# Patient Record
Sex: FEMALE | Race: BLACK OR AFRICAN AMERICAN | Employment: PART TIME | URBAN - METROPOLITAN AREA
[De-identification: names, ages, dates, MRNs, and addresses within clinical notes are randomized per-mention and may not be internally consistent; named-entity substitution may affect disease eponyms.]

---

## 2021-02-08 NOTE — TELEPHONE ENCOUNTER
REFERRAL INFORMATION:    Referring Provider:  Self Referral     Referring Clinic:  N/A    Reason for Visit/Diagnosis: New Surg, BMI 48        FUTURE VISIT INFORMATION:    Appointment Date: 2/16/2021    Appointment Time: 2:15 PM      NOTES RECORD STATUS  DETAILS   OFFICE NOTE from Referring Provider N/A    OFFICE NOTE from Other Specialists N/A    HOSPITAL DISCHARGE SUMMARY/ ED VISITS  N/A    OPERATIVE REPORT N/A    ENDOSCOPY (EGD)  N/A    PERTINENT LABS Care Everywhere    PATHOLOGY REPORTS (RELATED) N/A    IMAGING (CT, MRI, US, XR)  N/A

## 2021-02-16 ENCOUNTER — VIRTUAL VISIT (OUTPATIENT)
Dept: ENDOCRINOLOGY | Facility: CLINIC | Age: 36
End: 2021-02-16
Payer: COMMERCIAL

## 2021-02-16 ENCOUNTER — PRE VISIT (OUTPATIENT)
Dept: ENDOCRINOLOGY | Facility: CLINIC | Age: 36
End: 2021-02-16

## 2021-02-16 VITALS — BODY MASS INDEX: 49.51 KG/M2 | HEIGHT: 64 IN | WEIGHT: 290 LBS

## 2021-02-16 DIAGNOSIS — E66.01 MORBID OBESITY DUE TO EXCESS CALORIES (H): Primary | ICD-10-CM

## 2021-02-16 DIAGNOSIS — Z71.3 NUTRITIONAL COUNSELING: ICD-10-CM

## 2021-02-16 PROBLEM — O24.410 DIET CONTROLLED GESTATIONAL DIABETES MELLITUS (GDM) IN THIRD TRIMESTER: Status: ACTIVE | Noted: 2021-02-16

## 2021-02-16 PROCEDURE — 99204 OFFICE O/P NEW MOD 45 MIN: CPT | Mod: 95 | Performed by: NURSE PRACTITIONER

## 2021-02-16 PROCEDURE — 97802 MEDICAL NUTRITION INDIV IN: CPT | Mod: 95 | Performed by: DIETITIAN, REGISTERED

## 2021-02-16 ASSESSMENT — MIFFLIN-ST. JEOR: SCORE: 1995.43

## 2021-02-16 ASSESSMENT — PAIN SCALES - GENERAL: PAINLEVEL: NO PAIN (0)

## 2021-02-16 NOTE — LETTER
"2/16/2021       RE: Babita Rivas  [address]  [facility] [address]     Dear Colleague,    Thank you for referring your patient, Babita Rivas, to the [facility] [place] at [facility]. Please see a copy of my visit note below.    Babita Rivas is a 36 year old female who is being evaluated via a billable video visit.      The patient has been notified of following:     \"This video visit will be conducted via a call between you and your physician/provider. We have found that certain health care needs can be provided without the need for an in-person physical exam.  This service lets us provide the care you need with a video conversation.  If a prescription is necessary we can send it directly to your pharmacy.  If lab work is needed we can place an order for that and you can then stop by our lab to have the test done at a later time.    Video visits are billed at different rates depending on your insurance coverage.  Please reach out to your insurance provider with any questions.    If during the course of the call the physician/provider feels a video visit is not appropriate, you will not be charged for this service.\"    Patient has given verbal consent for Video visit? Yes  How would you like to obtain your AVS? MyChart  If you are dropped from the video visit, the video invite should be resent to: Text to cell phone: [phone]  Will anyone else be joining your video visit? No        Video-Visit Details    Type of service:  Video Visit    Video Start Time: 2:59 PM  Video End Time: 3:23 PM  * called cut out during visit, RD tried calling patient back and received voicemail left message to reschedule Or schedule for follow-up appointment in 1 month.     Originating Location (pt. Location): Home    Distant Location (provider location):  [facility] [place]     Platform used " for Video Visit: [employer]    During this virtual visit the patient is located in [place], patient verifies this as the location during the entirety of this visit.     New Bariatric Nutrition Consultation Note    Reason For Visit: Nutrition Assessment    Babita Rivas is a 35 year old presenting today for new bariatric nutrition consult.   Pt is interested in laparoscopic sleeve gastrectomy with Dr. Fitzpatrick expected surgery in May 2021. Patient is accompanied by self.  This is pt's first of 3rd required nutrition visits prior to surgery.     Pt referred by Arlette Corbett NP on February 15, 2021.  Patient with Co-morbidities of obesity including:  Type II DM no  Renal Failure no  Sleep apnea no  Hypertension no   Dyslipidemia no  Joint pain no  Back pain no  GERD no     Support System Reviewed With Patient 2/9/2021   Who do you have in your support network that can be available to help you for the first 2 weeks after surgery? My mother my kids my kids father   Who can you count on for support throughout your weight loss surgery journey? My family       ANTHROPOMETRICS:  There is no height or weight on file to calculate BMI.    Required weight loss goal pre-op: 10  lbs from initial consult weight (goal weight 280 lbs or less before surgery)       2/9/2021   I have tried the following methods to lose weight Watching portions or calories, Exercise, Slimfast, Prescription Medications       Weight Loss Questions Reviewed With Patient 2/9/2021   How long have you been overweight? From Middle age and beyond       SUPPLEMENT INFORMATION:  None at this time    NUTRITION HISTORY:    Skips meals frequently     Recall Diet Questions Reviewed With Patient 2/9/2021   Describe what you typically consume for breakfast (typical or most recent): Nothing or palmer egg sausage   Describe what you typically consume for lunch (typical or most recent): Order out if I m at work   Describe what you typically consume for supper (typical or most  recent): Chicken   Describe what you typically consume as snacks (typical or most recent): Chips   How many ounces of water, or other low calorie drinks, do you drink daily (8 oz=1 glass)? 24 oz   How many ounces of caffeine (coffee, tea, pop) do you drink daily (8 oz=1 glass)? 24 oz   How many ounces of carbonated (pop, beer, sparkling water) drinks do you drinky daily (8 oz=1 glass)? 32 oz - pop    How many ounces of juice, pop, sweet tea, sports drinks, protein drinks, other sweetened drinks, do you drink daily (8 oz=1 glass)? 32 oz   How many ounces of milk do you drink daily (8 oz=1 glass) 24 oz   Please indicate the type of milk: 2%   How often do you drink alcohol? 2-4 times a month   If you do drink alcohol, how many drinks might you have in a day? (one drink = 5 oz. wine, 1 can/bottle of beer, 1 shot liquor) 3 or 4       Eating Habits 2/9/2021   Do you have any dietary restrictions? No   Do you currently binge eat (eat a large amount of food in a short time)? No   Are you an emotional eater? No   Do you get up to eat after falling asleep? No   What foods do you crave? Crab legs shrimp       ADDITIONAL INFORMATION:    Dining Out History Reviewed With Patient 2/9/2021   How often do you dine out? Rarely.   Where do you dine out? (select all that apply) take out   What types of food do you order when you dine out? Sea food tami dickinson       Physical Activity Reviewed With Patient 2/9/2021   How often do you exercise? 1 to 2 times per week   What is the duration of your exercise (in minutes)? 15 Minutes   What types of exercise do you do? walking   What keeps you from being more active?  Pain         NUTRITION DIAGNOSIS:  Obesity r/t long history of self-monitoring deficit and excessive energy intake aeb BMI >30 kg/m2.    INTERVENTION:  Intervention Provided/Education Provided on post-op diet guidelines, vitamins/minerals essential post-operatively, GI anatomy of bariatric surgeries, ways to help prepare for  "post-op diet guidelines pre-operatively, portion/calorie-control, mindful eating and sources of protein.  Patient demonstrates understanding. Provided pt with list of goals RD contact information.      Questions Reviewed With Patient 2/9/2021   How ready are you to make changes regarding your weight? Number 1 = Not ready at all to make changes up to 10 = very ready. 10   How confident are you that you can change? 1 = Not confident that you will be successful making changes up to 10 = very confident. 10       Expected Engagement: good    GOALS:  Relating To Eating:  Eat slowly (20-30 minutes per meal), chewing foods well (25 chews per bite/applesauce consistency)  9\" Plate method (1/2 plate non-starchy vegetables/fruit, 1/4 plate lean protein, 1/4 plate whole grain starch - no more than 1/2 cup carb/meal)  Eat 3 meals per day  Avoid snacking    Relating to beverages:  Reduce caffeine/carbonation/calorie containing beverages    Initial Consult / Weight Loss    My Plate Planner_English - Pt Education  [email or web address]    Protein Sources for Weight Loss  [email or web address]     Carbohydrates  [email or web address]       Time spent with patient:24 minutes.  Becca Matthews, MS, RD, LD      "

## 2021-02-16 NOTE — ASSESSMENT & PLAN NOTE
Disorganized eating pattern. Denies hunger, cravings, excessive thoughts about food. Defer medications for weight loss for now. Most recent A1C was 4/2020 and was 6.1. Will update labs now. Could consider treatment of insulin resistance with metformin or GLP1 for preop weight loss.

## 2021-02-16 NOTE — NURSING NOTE
"Chief Complaint   Patient presents with     Consult     New bariatric appt.       Vitals:    02/16/21 1345   Weight: 131.5 kg (290 lb)   Height: 1.626 m (5' 4\")       Body mass index is 49.78 kg/m .                            AJ JONES, EMT    "

## 2021-02-16 NOTE — PROGRESS NOTES
Babita is a 35 year old who is being evaluated via a billable video visit.      How would you like to obtain your AVS? MyChart  If the video visit is dropped, the invitation should be resent by: Text to cell phone: [phone]  Will anyone else be joining your video visit? No      Video Start Time: 1411  Video-Visit Details    Type of service:  Video Visit    Video End Time:1443    Originating Location (pt. Location): Home    Distant Location (provider location):  [facility] [place]     Platform used for Video Visit: [employer]

## 2021-02-16 NOTE — PROGRESS NOTES
"Babita Rivas is a 36 year old female who is being evaluated via a billable video visit.      The patient has been notified of following:     \"This video visit will be conducted via a call between you and your physician/provider. We have found that certain health care needs can be provided without the need for an in-person physical exam.  This service lets us provide the care you need with a video conversation.  If a prescription is necessary we can send it directly to your pharmacy.  If lab work is needed we can place an order for that and you can then stop by our lab to have the test done at a later time.    Video visits are billed at different rates depending on your insurance coverage.  Please reach out to your insurance provider with any questions.    If during the course of the call the physician/provider feels a video visit is not appropriate, you will not be charged for this service.\"    Patient has given verbal consent for Video visit? Yes  How would you like to obtain your AVS? MyChart  If you are dropped from the video visit, the video invite should be resent to: Text to cell phone: [phone]  Will anyone else be joining your video visit? No        Video-Visit Details    Type of service:  Video Visit    Video Start Time: 2:59 PM  Video End Time: 3:23 PM  * called cut out during visit, RD tried calling patient back and received voicemail left message to reschedule Or schedule for follow-up appointment in 1 month.     Originating Location (pt. Location): Home    Distant Location (provider location):  [facility] [place]     Platform used for Video Visit: [employer]    During this virtual visit the patient is located in [place], patient verifies this as the location during the entirety of this visit.     New Bariatric Nutrition Consultation Note    Reason For Visit: Nutrition Assessment    Babita Rivas is a 35 year old presenting today for new bariatric nutrition " consult.   Pt is interested in laparoscopic sleeve gastrectomy with Dr. Fitzpatrick expected surgery in May 2021. Patient is accompanied by self.  This is pt's first of 3rd required nutrition visits prior to surgery.     Pt referred by Arlette Corbett NP on February 15, 2021.  Patient with Co-morbidities of obesity including:  Type II DM no  Renal Failure no  Sleep apnea no  Hypertension no   Dyslipidemia no  Joint pain no  Back pain no  GERD no     Support System Reviewed With Patient 2/9/2021   Who do you have in your support network that can be available to help you for the first 2 weeks after surgery? My mother my kids my kids father   Who can you count on for support throughout your weight loss surgery journey? My family       ANTHROPOMETRICS:  There is no height or weight on file to calculate BMI.    Required weight loss goal pre-op: 10  lbs from initial consult weight (goal weight 280 lbs or less before surgery)       2/9/2021   I have tried the following methods to lose weight Watching portions or calories, Exercise, Slimfast, Prescription Medications       Weight Loss Questions Reviewed With Patient 2/9/2021   How long have you been overweight? From Middle age and beyond       SUPPLEMENT INFORMATION:  None at this time    NUTRITION HISTORY:    Skips meals frequently     Recall Diet Questions Reviewed With Patient 2/9/2021   Describe what you typically consume for breakfast (typical or most recent): Nothing or palmer egg sausage   Describe what you typically consume for lunch (typical or most recent): Order out if I m at work   Describe what you typically consume for supper (typical or most recent): Chicken   Describe what you typically consume as snacks (typical or most recent): Chips   How many ounces of water, or other low calorie drinks, do you drink daily (8 oz=1 glass)? 24 oz   How many ounces of caffeine (coffee, tea, pop) do you drink daily (8 oz=1 glass)? 24 oz   How many ounces of carbonated (pop, beer,  sparkling water) drinks do you drinky daily (8 oz=1 glass)? 32 oz - pop    How many ounces of juice, pop, sweet tea, sports drinks, protein drinks, other sweetened drinks, do you drink daily (8 oz=1 glass)? 32 oz   How many ounces of milk do you drink daily (8 oz=1 glass) 24 oz   Please indicate the type of milk: 2%   How often do you drink alcohol? 2-4 times a month   If you do drink alcohol, how many drinks might you have in a day? (one drink = 5 oz. wine, 1 can/bottle of beer, 1 shot liquor) 3 or 4       Eating Habits 2/9/2021   Do you have any dietary restrictions? No   Do you currently binge eat (eat a large amount of food in a short time)? No   Are you an emotional eater? No   Do you get up to eat after falling asleep? No   What foods do you crave? Crab legs shrimp       ADDITIONAL INFORMATION:    Dining Out History Reviewed With Patient 2/9/2021   How often do you dine out? Rarely.   Where do you dine out? (select all that apply) take out   What types of food do you order when you dine out? Sea food pasta teena       Physical Activity Reviewed With Patient 2/9/2021   How often do you exercise? 1 to 2 times per week   What is the duration of your exercise (in minutes)? 15 Minutes   What types of exercise do you do? walking   What keeps you from being more active?  Pain         NUTRITION DIAGNOSIS:  Obesity r/t long history of self-monitoring deficit and excessive energy intake aeb BMI >30 kg/m2.    INTERVENTION:  Intervention Provided/Education Provided on post-op diet guidelines, vitamins/minerals essential post-operatively, GI anatomy of bariatric surgeries, ways to help prepare for post-op diet guidelines pre-operatively, portion/calorie-control, mindful eating and sources of protein.  Patient demonstrates understanding. Provided pt with list of goals RD contact information.      Questions Reviewed With Patient 2/9/2021   How ready are you to make changes regarding your weight? Number 1 = Not ready at all  "to make changes up to 10 = very ready. 10   How confident are you that you can change? 1 = Not confident that you will be successful making changes up to 10 = very confident. 10       Expected Engagement: good    GOALS:  Relating To Eating:  Eat slowly (20-30 minutes per meal), chewing foods well (25 chews per bite/applesauce consistency)  9\" Plate method (1/2 plate non-starchy vegetables/fruit, 1/4 plate lean protein, 1/4 plate whole grain starch - no more than 1/2 cup carb/meal)  Eat 3 meals per day  Avoid snacking    Relating to beverages:  Reduce caffeine/carbonation/calorie containing beverages    Initial Consult / Weight Loss    My Plate Planner_English - Pt Education  [email or web address]    Protein Sources for Weight Loss  [email or web address]     Carbohydrates  [email or web address]       Time spent with patient:24 minutes.  Becca Matthews, MS, RD, LD    "

## 2021-02-16 NOTE — LETTER
"2021       RE: Babita Rivas  [address]  [place] [address]     Dear Colleague,    Thank you for referring your patient, Babita Rivas, to the [facility] [place] at [facility]. Please see a copy of my visit note below.    New Bariatric Surgery Consultation Note    2021    RE: Babita Rivas  MR#: [id number]  : 1985      Referring provider: No flowsheet data found.    Chief Complaint/Reason for visit: evaluation for possible weight loss surgery    Dear Elizabeth Pascual PA-C (General),    I had the pleasure of seeing your patient, Babita Rivas, to evaluate her obesity and consider her for possible weight loss surgery. As you know, Babita Rivas is 35 year old.  She has a height of 5' 4\", a weight of 290 lbs 0 oz, and calculated Body mass index is 49.78 kg/m .    Assessment & Plan   Problem List Items Addressed This Visit        Digestive    Morbid obesity due to excess calories (H) - Primary     Disorganized eating pattern. Denies hunger, cravings, excessive thoughts about food. Defer medications for weight loss for now. Most recent A1C was 2020 and was 6.1. Will update labs now. Could consider treatment of insulin resistance with metformin or GLP1 for preop weight loss.          Relevant Orders    CBC with platelets    Comprehensive metabolic panel    Hemoglobin A1c    Lipid panel reflex to direct LDL Fasting    Parathyroid Hormone Intact    Vitamin D Deficiency    TSH with free T4 reflex         [facility] Everywhere   Review of external notes as documented above     50 minutes spent on the date of the encounter doing chart review, history and exam, documentation and further activities as noted above    Babita Rivas is a 35 year old female who presents to clinic today for the following health issues       HISTORY OF PRESENT ILLNESS:  Weight Loss History " Reviewed with Patient 2021   How long have you been overweight? From Middle age and beyond   What is the most that you have ever weighed? 290   What is the most weight you have lost? 40   I have tried the following methods to lose weight Watching portions or calories, Exercise, Slimfast, Prescription Medications   I have tried the following weight loss medications? (Check all that apply) None   Have you ever had weight loss surgery? No     Weight gain after pregnancies, after most recent pregnancy was able to lose 40lbs but then has regained this over the last 4 years   CO-MORBIDITIES OF OBESITY INCLUDE:     2021   I have the following health issues associated with obesity: Asthma     Gestational diabetes with most recent pregnancy   Pre-diabetes- A1C 6.3 2020   Sleep- no snoring, wakes refreshed   Reflux- none     Hasn't seen primary care in over a year, needs new primary care provider     PAST MEDICAL HISTORY:  History reviewed. No pertinent past medical history.  No blood clots     PAST SURGICAL HISTORY:  Past Surgical History:   Procedure Laterality Date      SECTION      x 2     TUBAL LIGATION         FAMILY HISTORY:   No family history on file.    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 Yes   Who do you have in your support network that can be available to help you for the first 2 weeks after surgery? My mother my kids my kids father   Who can you count on for support throughout your weight loss surgery journey? My family   Can you afford 3 meals a day?  Yes   Can you afford 50-60 dollars a month for vitamins? 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 2-4 times a month   If you do drink alcohol, how many drinks might you have in a day? (one drink = 5 oz. wine, 1 can/bottle of beer, 1 shot liquor) 3 or 4   Have you ever used any of the following nicotine products? No   Have you or are you currently using street drugs or prescription strength medication for which you do not have a prescription for? No   Do you have a history of chemical dependency (alcohol or drug abuse)? No         PSYCHOLOGICAL HISTORY:   Psychological History Reviewed With Patient 2/9/2021   Have you ever attempted suicide? Never.   Have you had thoughts of suicide in the past year? No   Have you ever been hospitalized for mental illness or a suicide attempt? Never.   Do you have a history of chronic pain? No   Have you ever been diagnosed with fibromyalgia? No   Are you currently being treated for any of the following? (select all that apply) Depression   Are you currently seeing a therapist or counselor?  No   Are you currently seeing a psychiatrist? No     Hasn't worked with therapist since [facility] but can go back if needed - was more for family issues     ROS:     2/9/2021   Skin:  Leg swelling   HEENT: None of these   Musculoskeletal: Back pain, Limited mobility, Swelling of legs   Cardiovascular: Shortness of breath with activity   Pulmonary: Shortness of breath with activity, Awaken from sleep to catch your breath   Gastrointestinal: None of the above   Genitourinary: Stress incontinence (losing urine when coughing, sneezing, etc.)   Hematological: None of the above   Neurological: None of the above   Female only: None of the above       EATING BEHAVIORS:     2/9/2021   Have you or anyone else thought that you had an eating disorder? No   Do you currently binge eat (eat a large amount of food in a short time)? No   Are you an emotional eater? No   Do you get up to eat after falling asleep? No     Skips breakfast  Eats  "late into the evening     EXERCISE:     2/9/2021   How often do you exercise? 1 to 2 times per week   What is the duration of your exercise (in minutes)? 15 Minutes   What types of exercise do you do? walking   What keeps you from being more active?  Pain       MEDICATIONS:  No current outpatient medications on file.       ALLERGIES:  No Known Allergies      PHYSICAL EXAM:  Objective    Ht 1.626 m (5' 4\")   Wt 131.5 kg (290 lb)   BMI 49.78 kg/m    Vitals - Patient Reported  Pain Score: No Pain (0)      Vitals:  No vitals were obtained today due to virtual visit.    Physical Exam   GENERAL: Healthy, alert and no distress  EYES: Eyes grossly normal to inspection.  No discharge or erythema, or obvious scleral/conjunctival abnormalities.  RESP: No audible wheeze, cough, or visible cyanosis.  No visible retractions or increased work of breathing.    SKIN: Visible skin clear. No significant rash, abnormal pigmentation or lesions.  NEURO: Cranial nerves grossly intact.  Mentation and speech appropriate for age.  PSYCH: Mentation appears normal, affect normal/bright, judgement and insight intact, normal speech and appearance well-groomed.      In summary, Babita Rivas has Class III obesity with a body mass index of Body mass index is 49.78 kg/m . kg/m2 and the comorbidities stated above. She completed an informational seminar and is a possible candidate for the laparoscopic gastric sleeve.  She will have to complete the following pre-requisites:    Received weight loss goal of 10 lb prior to surgery. 280 day of surgery   Dietitian x 3   Have preoperative laboratory tests drawn.  Psychological Evaluation with MMPI and clearance for weight loss surgery.  Letter of clearance from the following primary care provider     Today in the office we discussed gastric sleeve surgery. Preoperative, perioperative, and postoperative processes, management, and follow up were addressed.  Risks and benefits were outlined including " the risk of death, staple line leak (1-2%), PE, DVT, ulcer, worsening GERD, N/V, stricture, hernia, wound infection, weight regain, and vitamin deficiencies. I emphasized exercise and activity along with appropriate food choice as the main foundation for weight loss with surgery providing surgical reinforcement of this.  All questions were answered.  A goal sheet and support group handout were given to the patient.      MEDICATIONS:  Continue current medications without change  FUTURE LABS:       - Schedule a fasting blood draw - bariatric screening labs ordered  FUTURE APPOINTMENTS:       - Follow-up with me in 2 months       - Dr. Fitzpatrick in 1 month        - Dietitian monthly until surgery       -Schedule psych consult       - establish care with primary care provider, need letter of support       - schedule lab only appointment- any [place] location       Is patient currently taking narcotic/opioid medications? NO    Once the patient has completed the requirements in their task list and there are no further recommendations, the pt will be allowed to see the surgeon of her choice for consultation on the laparoscopic gastric sleeve surgery. Patient verbalizes understanding of the process to surgery and expectations for the postoperative period including the need for lifelong lifestyle changes, vitamin supplementation, and laboratory monitoring.    Sincerely,     Arlette Corbett NP    Bariatric Task List  Status:  Is patient a candidate for bariatric surgery?:  patient is a candidate for bariatric surgery -     Cleared to schedule surgeon consult?:    -     Status:    -     Surgeon: Nanette  -     Tentative surgery month/year: May 2021  -        Insurance: Insurance:  [facility]  -     [place]: PCP Recommendation and Medical Clearance:    -     HP Referral:    -     Other:    -        Patient Info: Initial Weight:  290 -     Date of Initial Weight/Height:  2/16/2021 -     Goal Weight (lbs):  280 - encouraged more   "  Required Weight Loss:  10 -     Surgery Type:  sleeve gastrectomy -     Multidisciplinary Meeting:    -        Dietician Visits: Structured weight loss required by insurance?:  structured weight loss required -     Dietician Visit 1:  Needed -     Dietician Visit 2:  Needed -     Dietician Visit 3:  Needed -     Dietician Visit 4:    -     Dietician Visit 5:    -     Dietician Visit 6:    -     Dietician Visit additional:    -     Clearance from dietician to see surgeon?:    -     Dietician Notes:    -        Psychological Evaluation: Psych eval:  Needed -     Therapist letter of support:    -     Psychiatrist letter of support:    -     Establish care with therapist:    -     Complete eating disorder evaluation:    -     Letter of clearance from therapist/eating disorder program:    -     Other:    -        Lab Work: Complete Blood Count:  Needed -     Comprehensive Metabolic Panel:  Needed -     Vitamin D:  Needed -     Hgb A1c:  Needed -     PTH:  Needed -     H. pylori:    -     TSH:  Needed -     Nicotine Testing:    -     Other:  Needed - lipids      Consults/ Clearance: Sleep Medicine:    -     Cardiac:    -     Pain:   -     Dental:    -     Endocrine:    -     Gastroenterology:    -     Vascular Medicine:    -     Hematology:    -     Medical Weight Management:   -     Physical Therapy/Exercise:    -     Nephrology:    -     Neurology:    -     Pulmonology:    -     Rheumatology:    -     Other    -     Other    -     Other    -           Testing: UGI:    -     EGD:    -     Other:    -        PCP: Establish care with PCP:  Needed -     Follow up with PCP:    -     PCP letter of support:  Needed -        Smoking: Quit tobacco use (3 months smoke free)?:    -     Quit date:    -        Patient Education:  Information Session:  Completed -     Given \"Making your decision\" handout?:    -     Given support group information?:    -     Attended support group?:    -     Support plan in place?:    -     Research " consents signed?:    -        Additional Surgery Requirements: Review Coag plan:    -     HgA1c <8:    -     Inpatient pain consult:    -     Final nicotine screen:    -     Dental work complete:    -     Birth control plan:    -     Other Requirements:    -     Other Requirements:    -        Final Tasks:  Before surgery online class:  Needed -     Before surgery online class website link:  [email or web address]   After surgery online class:  Needed -     After surgery online class website link:  [email or web address]   Nurse visit for weigh-in and information:  Needed -     Pre-assessment clinic visit with anesthesia team for H&P:  Needed -     Final labs (Hgb, plt, T&S, UA):  Needed -        Notes:   -              Babita is a 35 year old who is being evaluated via a billable video visit.      How would you like to obtain your AVS? MyChart  If the video visit is dropped, the invitation should be resent by: Text to cell phone: [phone]  Will anyone else be joining your video visit? No      Video Start Time: 1411  Video-Visit Details    Type of service:  Video Visit    Video End Time:1443    Originating Location (pt. Location): Home    Distant Location (provider location):  [facility] [place]     Platform used for Video Visit: [employer]

## 2021-02-16 NOTE — PROGRESS NOTES
"New Bariatric Surgery Consultation Note    2021    RE: Babita Rivas  MR#: [id number]  : 1985      Referring provider: No flowsheet data found.    Chief Complaint/Reason for visit: evaluation for possible weight loss surgery    Dear Elizabeth Pascual PA-C (General),    I had the pleasure of seeing your patient, Babita Rivas, to evaluate her obesity and consider her for possible weight loss surgery. As you know, Babita Rivas is 35 year old.  She has a height of 5' 4\", a weight of 290 lbs 0 oz, and calculated Body mass index is 49.78 kg/m .    Assessment & Plan   Problem List Items Addressed This Visit        Digestive    Morbid obesity due to excess calories (H) - Primary     Disorganized eating pattern. Denies hunger, cravings, excessive thoughts about food. Defer medications for weight loss for now. Most recent A1C was 2020 and was 6.1. Will update labs now. Could consider treatment of insulin resistance with metformin or GLP1 for preop weight loss.          Relevant Orders    CBC with platelets    Comprehensive metabolic panel    Hemoglobin A1c    Lipid panel reflex to direct LDL Fasting    Parathyroid Hormone Intact    Vitamin D Deficiency    TSH with free T4 reflex         [facility] Everywhere   Review of external notes as documented above     50 minutes spent on the date of the encounter doing chart review, history and exam, documentation and further activities as noted above    Babita Rivas is a 35 year old female who presents to clinic today for the following health issues       HISTORY OF PRESENT ILLNESS:  Weight Loss History Reviewed with Patient 2021   How long have you been overweight? From Middle age and beyond   What is the most that you have ever weighed? 290   What is the most weight you have lost? 40   I have tried the following methods to lose weight Watching portions or calories, Exercise, Slimfast, Prescription Medications   I have tried the " following weight loss medications? (Check all that apply) None   Have you ever had weight loss surgery? No     Weight gain after pregnancies, after most recent pregnancy was able to lose 40lbs but then has regained this over the last 4 years   CO-MORBIDITIES OF OBESITY INCLUDE:     2021   I have the following health issues associated with obesity: Asthma     Gestational diabetes with most recent pregnancy   Pre-diabetes- A1C 6.3 2020   Sleep- no snoring, wakes refreshed   Reflux- none     Hasn't seen primary care in over a year, needs new primary care provider     PAST MEDICAL HISTORY:  History reviewed. No pertinent past medical history.  No blood clots     PAST SURGICAL HISTORY:  Past Surgical History:   Procedure Laterality Date      SECTION      x 2     TUBAL LIGATION         FAMILY HISTORY:   No family history on file.    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 Yes   Who do you have in your support network that can be available to help you for the first 2 weeks after surgery? My mother my kids my kids father   Who can you count on for support throughout your weight loss surgery journey? My family   Can you afford 3 meals a day?  Yes   Can you afford 50-60 dollars a month for vitamins? 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 2-4 times a month   If you do drink alcohol, how many drinks might you have in a day? (one drink = 5 oz. wine, 1 can/bottle of beer, 1 shot liquor) 3 or 4   Have you ever used any of the following nicotine products? No   Have you or are you currently using street  drugs or prescription strength medication for which you do not have a prescription for? No   Do you have a history of chemical dependency (alcohol or drug abuse)? No         PSYCHOLOGICAL HISTORY:   Psychological History Reviewed With Patient 2/9/2021   Have you ever attempted suicide? Never.   Have you had thoughts of suicide in the past year? No   Have you ever been hospitalized for mental illness or a suicide attempt? Never.   Do you have a history of chronic pain? No   Have you ever been diagnosed with fibromyalgia? No   Are you currently being treated for any of the following? (select all that apply) Depression   Are you currently seeing a therapist or counselor?  No   Are you currently seeing a psychiatrist? No     Hasn't worked with therapist since [facility] but can go back if needed - was more for family issues     ROS:     2/9/2021   Skin:  Leg swelling   HEENT: None of these   Musculoskeletal: Back pain, Limited mobility, Swelling of legs   Cardiovascular: Shortness of breath with activity   Pulmonary: Shortness of breath with activity, Awaken from sleep to catch your breath   Gastrointestinal: None of the above   Genitourinary: Stress incontinence (losing urine when coughing, sneezing, etc.)   Hematological: None of the above   Neurological: None of the above   Female only: None of the above       EATING BEHAVIORS:     2/9/2021   Have you or anyone else thought that you had an eating disorder? No   Do you currently binge eat (eat a large amount of food in a short time)? No   Are you an emotional eater? No   Do you get up to eat after falling asleep? No     Skips breakfast  Eats late into the evening     EXERCISE:     2/9/2021   How often do you exercise? 1 to 2 times per week   What is the duration of your exercise (in minutes)? 15 Minutes   What types of exercise do you do? walking   What keeps you from being more active?  Pain       MEDICATIONS:  No current outpatient medications on file.  "      ALLERGIES:  No Known Allergies      PHYSICAL EXAM:  Objective    Ht 1.626 m (5' 4\")   Wt 131.5 kg (290 lb)   BMI 49.78 kg/m    Vitals - Patient Reported  Pain Score: No Pain (0)      Vitals:  No vitals were obtained today due to virtual visit.    Physical Exam   GENERAL: Healthy, alert and no distress  EYES: Eyes grossly normal to inspection.  No discharge or erythema, or obvious scleral/conjunctival abnormalities.  RESP: No audible wheeze, cough, or visible cyanosis.  No visible retractions or increased work of breathing.    SKIN: Visible skin clear. No significant rash, abnormal pigmentation or lesions.  NEURO: Cranial nerves grossly intact.  Mentation and speech appropriate for age.  PSYCH: Mentation appears normal, affect normal/bright, judgement and insight intact, normal speech and appearance well-groomed.      In summary, Babita Rivas has Class III obesity with a body mass index of Body mass index is 49.78 kg/m . kg/m2 and the comorbidities stated above. She completed an informational seminar and is a possible candidate for the laparoscopic gastric sleeve.  She will have to complete the following pre-requisites:    Received weight loss goal of 10 lb prior to surgery. 280 day of surgery   Dietitian x 3   Have preoperative laboratory tests drawn.  Psychological Evaluation with MMPI and clearance for weight loss surgery.  Letter of clearance from the following primary care provider     Today in the office we discussed gastric sleeve surgery. Preoperative, perioperative, and postoperative processes, management, and follow up were addressed.  Risks and benefits were outlined including the risk of death, staple line leak (1-2%), PE, DVT, ulcer, worsening GERD, N/V, stricture, hernia, wound infection, weight regain, and vitamin deficiencies. I emphasized exercise and activity along with appropriate food choice as the main foundation for weight loss with surgery providing surgical reinforcement of " this.  All questions were answered.  A goal sheet and support group handout were given to the patient.      MEDICATIONS:  Continue current medications without change  FUTURE LABS:       - Schedule a fasting blood draw - bariatric screening labs ordered  FUTURE APPOINTMENTS:       - Follow-up with me in 2 months       - Dr. Fitzpatrick in 1 month        - Dietitian monthly until surgery       -Schedule psych consult       - establish care with primary care provider, need letter of support       - schedule lab only appointment- any [facility] location       Is patient currently taking narcotic/opioid medications? NO    Once the patient has completed the requirements in their task list and there are no further recommendations, the pt will be allowed to see the surgeon of her choice for consultation on the laparoscopic gastric sleeve surgery. Patient verbalizes understanding of the process to surgery and expectations for the postoperative period including the need for lifelong lifestyle changes, vitamin supplementation, and laboratory monitoring.    Sincerely,     Arlette Corbett NP    Bariatric Task List  Status:  Is patient a candidate for bariatric surgery?:  patient is a candidate for bariatric surgery -     Cleared to schedule surgeon consult?:    -     Status:    -     Surgeon: Nanette  -     Tentative surgery month/year: May 2021  -        Insurance: Insurance:  [facility]  -     [place]: PCP Recommendation and Medical Clearance:    -      Referral:    -     Other:    -        Patient Info: Initial Weight:  290 -     Date of Initial Weight/Height:  2/16/2021 -     Goal Weight (lbs):  280 - encouraged more    Required Weight Loss:  10 -     Surgery Type:  sleeve gastrectomy -     Multidisciplinary Meeting:    -        Dietician Visits: Structured weight loss required by insurance?:  structured weight loss required -     Dietician Visit 1:  Needed -     Dietician Visit 2:  Needed -     Dietician Visit 3:  Needed -    "  Dietician Visit 4:    -     Dietician Visit 5:    -     Dietician Visit 6:    -     Dietician Visit additional:    -     Clearance from dietician to see surgeon?:    -     Dietician Notes:    -        Psychological Evaluation: Psych eval:  Needed -     Therapist letter of support:    -     Psychiatrist letter of support:    -     Establish care with therapist:    -     Complete eating disorder evaluation:    -     Letter of clearance from therapist/eating disorder program:    -     Other:    -        Lab Work: Complete Blood Count:  Needed -     Comprehensive Metabolic Panel:  Needed -     Vitamin D:  Needed -     Hgb A1c:  Needed -     PTH:  Needed -     H. pylori:    -     TSH:  Needed -     Nicotine Testing:    -     Other:  Needed - lipids      Consults/ Clearance: Sleep Medicine:    -     Cardiac:    -     Pain:   -     Dental:    -     Endocrine:    -     Gastroenterology:    -     Vascular Medicine:    -     Hematology:    -     Medical Weight Management:   -     Physical Therapy/Exercise:    -     Nephrology:    -     Neurology:    -     Pulmonology:    -     Rheumatology:    -     Other    -     Other    -     Other    -           Testing: UGI:    -     EGD:    -     Other:    -        PCP: Establish care with PCP:  Needed -     Follow up with PCP:    -     PCP letter of support:  Needed -        Smoking: Quit tobacco use (3 months smoke free)?:    -     Quit date:    -        Patient Education:  Information Session:  Completed -     Given \"Making your decision\" handout?:    -     Given support group information?:    -     Attended support group?:    -     Support plan in place?:    -     Research consents signed?:    -        Additional Surgery Requirements: Review Coag plan:    -     HgA1c <8:    -     Inpatient pain consult:    -     Final nicotine screen:    -     Dental work complete:    -     Birth control plan:    -     Other Requirements:    -     Other Requirements:    -        Final Tasks:  " Before surgery online class:  Needed -     Before surgery online class website link:  [email or web address]   After surgery online class:  Needed -     After surgery online class website link:  [email or web address]   Nurse visit for weigh-in and information:  Needed -     Pre-assessment clinic visit with anesthesia team for H&P:  Needed -     Final labs (Hgb, plt, T&S, UA):  Needed -        Notes:   -

## 2021-02-16 NOTE — PATIENT INSTRUCTIONS
"It was a pleasure meeting with you today.    Thank you for allowing us the privilege of caring for you. We hope we provided you with the excellent service you deserve.   Please let us know if there is anything else we can do for you so that we can be sure you are leaving completely satisfied with your care experience.    To ensure the quality of our services you may be receiving a patient satisfaction survey from an independent patient satisfaction monitoring company.    The greatest compliment you can give is a \"Likely to Recommend\"      Your visit was with Arlette Corbett NP today.     Instructions per today's visit:     MEDICATIONS:  Continue current medications without change  FUTURE LABS:       - Schedule a fasting blood draw - bariatric screening labs ordered  FUTURE APPOINTMENTS:       - Follow-up with me in 2 months       - Dr. Fitzpatrick in 1 month        - Dietitian monthly until surgery       -Schedule psych consult       - establish care with primary care provider, need letter of support       - schedule lab only appointment- any [facility] location                    - Read Bariatric Surgery packet- in Mediafly message    Call Dillon Batista at [phone] to discuss insurance coverage for bariatric surgery.  Please check with your insurance regarding bariatric surgery coverage also.          To schedule appointments with our team, please call [phone] option #1    Please call during clinic hours Monday through Friday 8:00a - 4:00p if you have questions or you can contact us via Telecardia at anytime.      Nurses: [phone]  Fax: [phone]  Surgery Scheduler: [phone]    Please call the hospital at [phone] to speak with our on call MDs if you have urgent needs after hours, during weekends, or holidays.    **Please note if you need to go to the Emergency Room for any reason in the first 90 days after surgery it is important to go to the [facility] ER on the [place] on [address]. This " off of the [place] exit off of [address].    *For patients who are currently enrolled in our program and have not yet had weight loss surgery please note*:    You must be at your required goal weight at the time of your Anesthesia clinic visit as well as the day of surgery or your surgery will be canceled. You will not be able to obtain a new surgery date until you have met your goal weight.    Lab results will be communicated through My Chart or letter (if My Chart not used). Please call the clinic if you have not received communication after 1 week or if you have any questions.?    Main follow-up parameters for all bariatric patients     Patients who have undergone Bariatric surgery:    1. Follow-up interval during the first year: ~1 week, 1 month, 3 month, 6 month,12 months.  Every 6 months until 5 years; and then annually thereafter.  The goal of follow-up sessions is to ensure that food intake behavior (choice of food and eating speed) and exercise/activity level are set appropriately.    2. Yearly lab tests ordered by our clinic.      3. The bariatric team should be aware and potentially evaluate all adverse gastrointestinal symptoms (dysphagia, abdominal pain, nausea, vomiting, diarrhea, heartburn, reflux, etc), which can be a sign of complication.  The bariatric surgeons perform general surgery procedures in addition to bariatric surgery (laparoscopic cholecystectomy, etc.)    4. Inability to tolerate textured food (chicken, steak, fish, eggs, beans) is NOT normal and may need to be evaluated by endoscopy performed by the bariatric surgeon.    _____________________________________________________________________________________________________________________________    Meal Replacement Products:    Here is the link to our new e-store where you can purchase our meal replacement products    [facility] E-Store  [email or web address]    The one week starter kit is a great way to sample a variety of products and  see what works for you.    If you want more information about the product go to: [employer] Steps Meals  Kiddy    Free Shipping for orders over $75     Benefits of meal replacements products:    Portion and calorie control  Improved nutrition  Structured eating  Simplified food choices  Avoid contact with trigger foods  ______________________________________________________________________________________________________________________________    Healthy Lifestyle Support Group   [facility] Weight Management Program  Virtual Support Group Now Available    60 minutes of small group guided discussion, support and resources    Led by Health , Kira Hartley    For questions, and to receive the invite information, contact Kira at [email or web address]    All our welcome!    One Friday per month, 12:30pm to 1:30pm  SELF COMPASSION: July 31st  CREATING MY WELLNESS VISION: August 28th  MINDFULNESS PRACTICES FOR A CALM BODY & MIND: September 25th  MINDFUL EATING TOOLS: October 30th  HEALTHY& HAPPY HOLIDAYS: November 20th  OPEN FORUM: December 18th  _______________________________________________________________________________________________________________________________    Connections: Bariatric Care support group  Due to the Covid-19 restrictions, we will be doing our support group virtually using Microsoft Teams. You will need to get an invitation to the group from Abel Elizabeth, Ph.D., LP at [email or web address] and to learn about using Microsoft Teams. The next meeting is on Tuesday, July 14 between 6:30 and 8 PM and there will be no formal speaker.    This group meets the second Tuesday of each month from 6:30 to 8 PM and will be held again at [facility] in the [address] [place] [address] (A-B room) when the Covid-19 restrictions are lifted. The group is led by Abel Elizabeth, Ph.D., Licensed Psychologist, [facility] Comprehensive Weight Management Program. There is  no cost for group participation and is open to all [facility] (and those external to this program) pre- and post-operative bariatric surgery patients as well as their support system.   _________________________________________________________________________________________________________________________________      Interested in working with a health ?  Health coaches work with you to improve your overall health and wellbeing.  They look at the whole person, and may involve discussion of different areas of life, including, but not limited to the four pillars of health (sleep, exercise, nutrition, and stress management). Discuss with your care team if you would like to start working a health .     Health Coaching-3 Pack:      $99 for three health coaching visits    Visits may be done in person or via phone    Coaching is a partnership between the  and the client; Coaches do not prescribe or diagnose    Coaching helps inspire the client to reach his/her personal goals   __________________________________________________________________________________________________________________________________    [facility] Get Moving Program    Our team of physical therapists is trained to help you understand and take control of your condition. They will perform a thorough evaluation to determine your ability for activity and develop a customized plan to fit your goals and physical ability.  Scheduling: Unsure if the Get Moving program is right for you? Discuss the program with your medical provider or diabetes educator. You can also call us at [phone] to ask questions or schedule an appointment.   [facility] Get Moving Program  ______________________________________________________________________________________________________________________  Bluetooth Scale:    We hope to provide you with high quality telephone and virtual healthcare visits while social distancing for COVID-19 is  necessary, as well as in the future when virtual visits may be more convenient for you.     Our technology team made it possible for Bluetooth scales to send weight measurements to our electronic medical record. This allows weights from you weighing at home to securely flow into the medical record, which will improve telephone and virtual visits.   Additionally, studies have shown that adults actually lose more weight when their weights are automatically sent to someone else, and also that this process is not stressful for those adults.    Below is a link for purchasing the scale, with a discount code for our patients. You may call your insurance company to see if they will reimburse you for the cost of the scale, as a piece of durable medical equipment. The scales only go up to a weight of 400 pounds. This is an issue and we are working with the developer on increasing this. We found no scales that go over 400lb that have blue-tooth for connecting to Status Overload.    Scale to purchase: the Wello  Body  Scale: [email or web address]=aw.ds    Discount Code: We have a discount code for our patients to bring the cost down to $50, the code is: [place]_Scale_20%off    Steps to link the scale to Status Overload via an Android Phone (you can always disconnect at any point in the future):  1. The order must be placed first before the patient can access Track My Health within Status Overload.  2. Download Google Fit sam from the Google Play Store   a. Log in or register using your Google account   3. Download the Status Overload sam from Google Play Store  a. Select add organization   b. Search for [employer] and select it   c. Log into Status Overload  d. Select Track My Health   e. Select the green connect my account button   f. When prompted log into your Google account   g. Select okay to confirm the account   4. Download the Withings Health Mate sam from Google Play  Store  a. [place] for Score [employer] Board   b. Go to profile   c. Tap google fit under the Apps section  d. Select the option to activate Google Fit integration   e. Select the same Google account   f. Select okay to confirm the account  g.   Steps to link the scale to Fantasy Shopper via an iPhone (you can always disconnect at any point in the future):  **Note Binpress is not available for download on an iPad**  1. The order must be placed first before the patient can access Track My Health within Fantasy Shopper.  2. Locate the Health armand on your iPhone.  a. Set up your Apple Health account as prompted  b. The Sources page will show Apps that communicate with your Health armand. Once all steps are completed, you should see Pimovation and MD On-Linet listed under the Apps section and your iPhone under the devices section.  i. Select Health Mate  1. Under 'ALLOW  HEALTH MATE  TO WRITE DATA ensure the toggle is on for Weight.  2. This will allow the scale to add your weight to the Apple Health  ii. Select MD On-Linet  1. Under 'ALLOW  [employer]  TO READ DATA ensure the toggle is on for Weight.  2. This allows Fantasy Shopper to grab the weight from Binpress so your provider can see your weights.  3. Download the Fantasy Shopper armand from the Armand Store   a. Select add organization                                                  b. Search for [employer]wire and select it  c. Log into Fantasy Shopper  d. Select Track My Health   e. Select the green connect my account button   f. Follow prompts to link your device to Fantasy Shopper.  4. Download the Withings health mate armand in the Armand Store   a. [place] for [employer]   b. Go to profile   c. [employer] under the Apps section  d. If prompted to allow access with the Health Armand, toggle weight on for read and write access.     Bariatric Task List  Status:  Is patient a candidate for bariatric surgery?:  patient is a candidate for bariatric surgery -     Cleared to schedule surgeon consult?:    -     Status:    -     Surgeon: Nanette Winchester      Tentative surgery month/year: May 2021  -        Insurance: Insurance:  [facility]     Isabellana: PCP Recommendation and Medical Clearance:    -     HP Referral:    -     Other:    -        Patient Info: Initial Weight:  290 -     Date of Initial Weight/Height:  2/16/2021 -     Goal Weight (lbs):  280 - encouraged more    Required Weight Loss:  10 -     Surgery Type:  sleeve gastrectomy -     Multidisciplinary Meeting:    -        Dietician Visits: Structured weight loss required by insurance?:  structured weight loss required -     Dietician Visit 1:  Needed -     Dietician Visit 2:  Needed -     Dietician Visit 3:  Needed -     Dietician Visit 4:    -     Dietician Visit 5:    -     Dietician Visit 6:    -     Dietician Visit additional:    -     Clearance from dietician to see surgeon?:    -     Dietician Notes:    -        Psychological Evaluation: Psych eval:  Needed -     Therapist letter of support:    -     Psychiatrist letter of support:    -     Establish care with therapist:    -     Complete eating disorder evaluation:    -     Letter of clearance from therapist/eating disorder program:    -     Other:    -        Lab Work: Complete Blood Count:  Needed -     Comprehensive Metabolic Panel:  Needed -     Vitamin D:  Needed -     Hgb A1c:  Needed -     PTH:  Needed -     H. pylori:    -     TSH:  Needed -     Nicotine Testing:    -     Other:  Needed - lipids      Consults/ Clearance: Sleep Medicine:    -     Cardiac:    -     Pain:   -     Dental:    -     Endocrine:    -     Gastroenterology:    -     Vascular Medicine:    -     Hematology:    -     Medical Weight Management:   -     Physical Therapy/Exercise:    -     Nephrology:    -     Neurology:    -     Pulmonology:    -     Rheumatology:    -     Other    -     Other    -     Other    -           Testing: UGI:    -     EGD:    -     Other:    -        PCP: Establish care with PCP:  Needed -     Follow up with PCP:    -     PCP letter of support:   "Needed -        Smoking: Quit tobacco use (3 months smoke free)?:    -     Quit date:    -        Patient Education:  Information Session:  Completed -     Given \"Making your decision\" handout?:    -     Given support group information?:    -     Attended support group?:    -     Support plan in place?:    -     Research consents signed?:    -        Additional Surgery Requirements: Review Coag plan:    -     HgA1c <8:    -     Inpatient pain consult:    -     Final nicotine screen:    -     Dental work complete:    -     Birth control plan:    -     Other Requirements:    -     Other Requirements:    -        Final Tasks:  Before surgery online class:  Needed -     Before surgery online class website link:  [email or web address]   After surgery online class:  Needed -     After surgery online class website link:  [email or web address]   Nurse visit for weigh-in and information:  Needed -     Pre-assessment clinic visit with anesthesia team for H&P:  Needed -     Final labs (Hgb, plt, T&S, UA):  Needed -        Notes:   -            "

## 2021-02-17 NOTE — PATIENT INSTRUCTIONS
"GOALS:  Relating To Eating:  Eat slowly (20-30 minutes per meal), chewing foods well (25 chews per bite/applesauce consistency)  9\" Plate method (1/2 plate non-starchy vegetables/fruit, 1/4 plate lean protein, 1/4 plate whole grain starch - no more than 1/2 cup carb/meal)  Eat 3 meals per day  Avoid snacking    Relating to beverages:  Reduce caffeine/carbonation/calorie containing beverages    Initial Consult / Weight Loss    My Plate Planner_English - Pt Education  [email or web address]    Protein Sources for Weight Loss  [email or web address]     Carbohydrates  [email or web address]     Follow up with RD in one month    Becca Matthews, MS, RD, LD  If you need to schedule or reschedule with a dietitian please call [phone].  "

## 2021-02-23 ENCOUNTER — MYC MEDICAL ADVICE (OUTPATIENT)
Dept: ENDOCRINOLOGY | Facility: CLINIC | Age: 36
End: 2021-02-23

## 2021-02-23 ENCOUNTER — CARE COORDINATION (OUTPATIENT)
Dept: ENDOCRINOLOGY | Facility: CLINIC | Age: 36
End: 2021-02-23

## 2021-02-23 DIAGNOSIS — E66.01 MORBID OBESITY DUE TO EXCESS CALORIES (H): Primary | ICD-10-CM

## 2021-02-23 NOTE — PROGRESS NOTES
Task list updated and bariatric information/clearance letters sent via Artaic.    Bariatric Task List  Status:  Is patient a candidate for bariatric surgery?:  patient is a candidate for bariatric surgery -     Cleared to schedule surgeon consult?:    -     Status:    -     Surgeon: Nanette  -     Feiative surgery month/year: May 2021  -        Insurance: Insurance:  [facility]  -     Cigna: PCP Recommendation and Medical Clearance:    -     HP Referral:    -     Other:    -        Patient Info: Initial Weight:  290 -     Date of Initial Weight/Height:  2/16/2021 -     Goal Weight (lbs):  280 - encouraged more    Required Weight Loss:  10 -     Surgery Type:  sleeve gastrectomy -     Multidisciplinary Meeting:    -        Dietician Visits: Structured weight loss required by insurance?:  structured weight loss required -     Dietician Visit 1:  Needed -     Dietician Visit 2:  Needed -     Dietician Visit 3:  Needed -     Dietician Visit 4:    -     Dietician Visit 5:    -     Dietician Visit 6:    -     Dietician Visit additional:    -     Clearance from dietician to see surgeon?:    -     Dietician Notes:    -        Psychological Evaluation: Psych eval:  Needed - List and letter sent 2/23/21 - AS   Therapist letter of support:    -     Psychiatrist letter of support:    -     Establish care with therapist:    -     Complete eating disorder evaluation:    -     Letter of clearance from therapist/eating disorder program:    -     Other:    -        Lab Work: Complete Blood Count:  Needed - Ordered 2/16/21 - AS   Comprehensive Metabolic Panel:  Needed - Ordered 2/16/21 - AS   Vitamin D:  Needed - V   Hgb A1c:  Needed - Ordered 2/16/21 - AS   PTH:  Needed - Ordered 2/16/21 - AS   H. pylori:    -     TSH:  Needed - Ordered 2/16/21 - AS   Nicotine Testing:    -     Other:  Needed - lipids Ordered 2/16/21 - AS      Consults/ Clearance: Sleep Medicine:    -     Cardiac:    -     Pain:   -     Dental:    -     Endocrine:    -   "   Gastroenterology:    -     Vascular Medicine:    -     Hematology:    -     Medical Weight Management:   -     Physical Therapy/Exercise:    -     Nephrology:    -     Neurology:    -     Pulmonology:    -     Rheumatology:    -     Other    -     Other    -     Other    -           Testing: UGI:    -     EGD:    -     Other:    -        PCP: Establish care with PCP:  Needed -     Follow up with PCP:    -     PCP letter of support:  Needed - Letter sent to pt 2/23/21 - AS      Smoking: Quit tobacco use (3 months smoke free)?:    -     Quit date:    -        Patient Education:  Information Session:  Completed -     Given \"Making your decision\" handout?:  Given \"\"Making your decision\"\" handout? - 2/23/21   Given support group information?:  support group contact information provided - 2/23/21   Attended support group?:    -     Support plan in place?:    -     Research consents signed?:    -        Additional Surgery Requirements: Review Coag plan:    -     HgA1c <8:    -     Inpatient pain consult:    -     Final nicotine screen:    -     Dental work complete:    -     Birth control plan:    -     Other Requirements:    -     Other Requirements:    -        Final Tasks:  Before surgery online class:  Needed -     Before surgery online class website link:  [email or web address]   After surgery online class:  Needed -     After surgery online class website link:  [email or web address]   Nurse visit for weigh-in and information:  Needed -     Pre-assessment clinic visit with anesthesia team for H&P:  Needed -     Final labs (Hgb, plt, T&S, UA):  Needed -        Notes:   -           "

## 2021-03-07 ENCOUNTER — HEALTH MAINTENANCE LETTER (OUTPATIENT)
Age: 36
End: 2021-03-07

## 2021-03-09 ENCOUNTER — TELEPHONE (OUTPATIENT)
Dept: ENDOCRINOLOGY | Facility: CLINIC | Age: 36
End: 2021-03-09

## 2021-03-09 NOTE — TELEPHONE ENCOUNTER
Called patient today as requested via a efectivoxhart message. Pt is currently at work but was wondering what her next step for surgery are, pt already has RD scheduled in March.     This writer explained next steps are setting up an appointment with her surgeon and calling to schedule for a bariatric psychological assessment.     This writer messaged scheduling team to help patient with surgeon scheduling and sent MyChart message with lists of psychology providers for bariatric evaluation.     Becca Matthews, MS, RD, LD

## 2021-03-10 ENCOUNTER — CARE COORDINATION (OUTPATIENT)
Dept: ENDOCRINOLOGY | Facility: CLINIC | Age: 36
End: 2021-03-10

## 2021-03-10 NOTE — PROGRESS NOTES
"Tasklist updated and sent to patient via [employer].    Bariatric Task List  Status:  Is patient a candidate for bariatric surgery?:  patient is a candidate for bariatric surgery -     Cleared to schedule surgeon consult?:  cleared to schedule surgeon consult - 4/29/2021 appointment   Status:  surgery evaluation in process -     Surgeon: Nanette  -     Aniyah surgery month/year: May or June 2021, 3-4 weeks after tasks are done. -        Insurance: Insurance:  [facility]  -        Patient Info: Initial Weight:  290 -     Date of Initial Weight/Height:  2/16/2021 -     Goal Weight (lbs):  280 - encouraged more    Required Weight Loss:  10 -     Surgery Type:  sleeve gastrectomy -        Dietician Visits: Structured weight loss required by insurance?:  structured weight loss required -     Dietician Visit 1:  Completed - 2/16/21. bks   Dietician Visit 2:  Needed - 3/17/21 appt.   Dietician Visit 3:  Needed - 4/20/21 appt.    Dietician Visit additional:  Needed - Monthly if needed to reach pre-surgery goal weight or for postop diet teaching. bks      Psychological Evaluation: Psych eval:  Needed - List and letter sent 2/23/21 - AS      Lab Work:  Can be done at any [facility] lab. Complete Blood Count:  Needed - Ordered 2/16/21 - AS   Comprehensive Metabolic Panel:  Needed - Ordered 2/16/21 - AS   Vitamin D:  Needed - Ordered 2/16/21   Hgb A1c:  Needed - Ordered 2/16/21 - AS   PTH:  Needed - Ordered 2/16/21 - AS   TSH:  Needed - Ordered 2/16/21 - AS   Other:  Needed - lipids Ordered 2/16/21 - AS      PCP: Establish care with PCP:  Needed -     PCP letter of support:  Needed - Letter sent to pt 2/23/21 - AS      Patient Education:  Information Session:  Completed - 2/12/21. bks   Given \"Making your decision\" handout?:  Given \"\"Making your decision\"\" handout? - 2/23/21   Given support group information?:  support group contact information provided - 2/23/21   Attended support group?:    -     Support plan in place?:    -      "   Final Tasks:  Before surgery online class:  Needed -     Before surgery online class website link:  [email or web address]   After surgery online class:  Needed -     After surgery online class website link:  [email or web address]   Nurse visit for weigh-in and information:  Needed -     Pre-assessment clinic visit with anesthesia team for H&P:  Needed -     Final labs (Hgb, plt, T&S, UA):  Needed -        Notes: Please register for the Get Well Loop when you get an email invitation and a surgery date.     The Get Well Loop will give you information via email or text messages that can help you be more successful before and after surgery.  It can also help answer any questions you may have.    Get Well Loop Information  [email or web address]

## 2021-03-16 ENCOUNTER — TELEPHONE (OUTPATIENT)
Dept: ENDOCRINOLOGY | Facility: CLINIC | Age: 36
End: 2021-03-16

## 2021-03-16 NOTE — TELEPHONE ENCOUNTER
Patient requesting referral for [facility] for Bariatric Psych Clearance.  Referral placed and patient notified it is okay to schedule.

## 2021-03-16 NOTE — TELEPHONE ENCOUNTER
Patient called to schedule a psychological evaluation with Dr. Cortes. First available is May 25 at 9 a.m. Patient states that won't work because she wants to have surgery in May.   I told her to call Millicent Ulloa, Ross Bernardo or Ross Welch. She will call to see if she can get an appointment sooner.

## 2021-03-22 ENCOUNTER — TELEPHONE (OUTPATIENT)
Dept: ENDOCRINOLOGY | Facility: CLINIC | Age: 36
End: 2021-03-22

## 2021-03-22 NOTE — TELEPHONE ENCOUNTER
Reason for call: Pt is not on [facility] and no response to text link.     Called patient for 9:30 am RD appointment, received voicemail and message that mail box was full. Unable to leave a message at this time.    Becca Matthews, MS, RD, LD

## 2021-03-22 NOTE — TELEPHONE ENCOUNTER
Called patient for second attempt.     Spoke with patient today and she was sleeping, just woke up. Will cancel and reschedule today appointment.     Becca Matthews, MS, RD, LD

## 2021-03-24 ENCOUNTER — VIRTUAL VISIT (OUTPATIENT)
Dept: ENDOCRINOLOGY | Facility: CLINIC | Age: 36
End: 2021-03-24
Payer: COMMERCIAL

## 2021-03-24 DIAGNOSIS — Z71.3 NUTRITIONAL COUNSELING: ICD-10-CM

## 2021-03-24 DIAGNOSIS — E66.01 MORBID OBESITY DUE TO EXCESS CALORIES (H): Primary | ICD-10-CM

## 2021-03-24 PROCEDURE — 97803 MED NUTRITION INDIV SUBSEQ: CPT | Mod: 95 | Performed by: DIETITIAN, REGISTERED

## 2021-03-24 NOTE — PROGRESS NOTES
"Babita Rivas is a 35 year old who is being evaluated via a billable video visit.      The patient has been notified of following:     \"This video visit will be conducted via a call between you and your physician/provider. We have found that certain health care needs can be provided without the need for an in-person physical exam.  This service lets us provide the care you need with a video conversation.  If a prescription is necessary we can send it directly to your pharmacy.  If lab work is needed we can place an order for that and you can then stop by our lab to have the test done at a later time.    Video visits are billed at different rates depending on your insurance coverage.  Please reach out to your insurance provider with any questions.    If during the course of the call the physician/provider feels a video visit is not appropriate, you will not be charged for this service.\"    Patient has given verbal consent for Video visit? Yes  How would you like to obtain your AVS? MyChart  If you are dropped from the video visit, the video invite should be resent to: Text to cell phone: [phone]  Will anyone else be joining your video visit? No        Video-Visit Details    Type of service:  Video Visit    Video Start Time: 4:07 PM  Video End Time: 4:32 PM    Originating Location (pt. Location): Home    Distant Location (provider location):  [facility] [place]     Platform used for Video Visit: [employer]    During this virtual visit the patient is located in [place], patient verifies this as the location during the entirety of this visit.     Return Bariatric Nutrition Consultation Note    Reason For Visit: Nutrition Assessment    Babita Rivas is a 35 year old presenting today for follow-up bariatric nutrition consult.   Pt is interested in laparoscopic sleeve gastrectomy with Dr. Fitzpatrick expected surgery in May 2021.  Patient is accompanied by self.  This is pt's 2nd of 3rd " "required nutrition visits prior to surgery.     Pt referred by Arlette Corbett NP on February 15, 2021.  Patient with Co-morbidities of obesity including:  Type II DM no  Renal Failure no  Sleep apnea no  Hypertension no   Dyslipidemia no  Joint pain no  Back pain no  GERD no     Support System Reviewed With Patient 2/9/2021   Who do you have in your support network that can be available to help you for the first 2 weeks after surgery? My mother my kids my kids father   Who can you count on for support throughout your weight loss surgery journey? My family       ANTHROPOMETRICS:  Estimated body mass index is 49.78 kg/m  as calculated from the following:    Height as of 2/16/21: 1.626 m (5' 4\").    Weight as of 2/16/21: 131.5 kg (290 lb).    Required weight loss goal pre-op: 10 lbs from initial consult weight (goal weight 280 lbs or less before surgery)       2/9/2021   I have tried the following methods to lose weight Watching portions or calories, Exercise, Slimfast, Prescription Medications       Weight Loss Questions Reviewed With Patient 2/9/2021   How long have you been overweight? From Middle age and beyond       SUPPLEMENT INFORMATION:  None at this time    NUTRITION HISTORY:  Continues to skips meals no consistency in eating pattern.   She had a family member pass away and has been busy with family and reports she has not been focusing on the nutrition goals at this time.         Progress Towards Previous Goals:  Relating To Eating:  Eat slowly (20-30 minutes per meal), chewing foods well (25 chews per bite/applesauce consistency)-Continues   9\" Plate method (1/2 plate non-starchy vegetables/fruit, 1/4 plate lean protein, 1/4 plate whole grain starch - no more than 1/2 cup carb/meal)- Continues, having a pastry for breakfast.   Eat 3 meals per day-Continues, only eating 1 meal a day.   Avoid snacking-Continues    Relating to beverages:  Reduce caffeine/carbonation/calorie containing beverages-Met   Separate " fluid between meals-Met        Recall Diet Questions Reviewed With Patient 2/9/2021   Describe what you typically consume for breakfast (typical or most recent): Nothing or palmer egg sausage   Describe what you typically consume for lunch (typical or most recent): Order out if I m at work   Describe what you typically consume for supper (typical or most recent): Chicken   Describe what you typically consume as snacks (typical or most recent): Chips   How many ounces of water, or other low calorie drinks, do you drink daily (8 oz=1 glass)? 24 oz   How many ounces of caffeine (coffee, tea, pop) do you drink daily (8 oz=1 glass)? 24 oz   How many ounces of carbonated (pop, beer, sparkling water) drinks do you drinky daily (8 oz=1 glass)? 32 oz - pop    How many ounces of juice, pop, sweet tea, sports drinks, protein drinks, other sweetened drinks, do you drink daily (8 oz=1 glass)? 32 oz   How many ounces of milk do you drink daily (8 oz=1 glass) 24 oz   Please indicate the type of milk: 2%   How often do you drink alcohol? 2-4 times a month   If you do drink alcohol, how many drinks might you have in a day? (one drink = 5 oz. wine, 1 can/bottle of beer, 1 shot liquor) 3 or 4       Eating Habits 2/9/2021   Do you have any dietary restrictions? No   Do you currently binge eat (eat a large amount of food in a short time)? No   Are you an emotional eater? No   Do you get up to eat after falling asleep? No   What foods do you crave? Crab legs shrimp       ADDITIONAL INFORMATION:    Dining Out History Reviewed With Patient 2/9/2021   How often do you dine out? Rarely.   Where do you dine out? (select all that apply) take out   What types of food do you order when you dine out? Sea food tami dickinson       Physical Activity Reviewed With Patient 2/9/2021   How often do you exercise? 1 to 2 times per week   What is the duration of your exercise (in minutes)? 15 Minutes   What types of exercise do you do? walking   What keeps  "you from being more active?  Pain         NUTRITION DIAGNOSIS:  Obesity r/t long history of self-monitoring deficit and excessive energy intake aeb BMI >30 kg/m2.    INTERVENTION:  Intervention Provided/Education:   1. Reviewed and modified previous goals  2. Reviewed post-op diet guidelines, vitamins/minerals essential post-operatively, GI anatomy of bariatric surgeries, ways to help prepare for post-op diet guidelines pre-operatively, portion/calorie-control, mindful eating and sources of protein.    3. Discussed readiness for surgery, pt insists she is ready for surgery and would like to have surgery in May. Discussed continuing to work on nutrition goals prior to surgery.    4. AVS via [employer]     Questions Reviewed With Patient 2/9/2021   How ready are you to make changes regarding your weight? Number 1 = Not ready at all to make changes up to 10 = very ready. 10   How confident are you that you can change? 1 = Not confident that you will be successful making changes up to 10 = very confident. 10       Expected Engagement: fair    GOALS:  Relating To Eating:  Eat slowly (20-30 minutes per meal), chewing foods well (25 chews per bite/applesauce consistency)  9\" Plate method (1/2 plate non-starchy vegetables/fruit, 1/4 plate lean protein, 1/4 plate whole grain starch - no more than 1/2 cup carb/meal)  Eat 3 meals per day  Avoid snacking    Relating to beverages:  Reduce caffeine/carbonation/calorie containing beverages     *Protein Shake Criteria: no more than 210 Calories, at least 20 grams of protein, and less than 10 grams of sugar     Meal Replacement Options:   [facility] smoothie (160 Calories, 20 g protein)   Premier Protein (160 Calories, 30 g protein)  Slim Fast Advanced Nutrition (180 Calories, 20 g protein)  Muscle Milk, lactose-free, 17 oz bottle (210 Calories, 30 g protein)  Integrated Supplements, no artificial sugars (110 Calories, 20 g protein)  Quest Protein Bars (190 Calories, 20 g " protein)  No Cow Protein Bar, gluten, dairy, and soy free (200 Calories, 20 g protein)    Initial Consult / Weight Loss    My Plate Planner_English - Pt Education  [email or web address]    Protein Sources for Weight Loss  [email or web address]     Carbohydrates  [email or web address]       Time spent with patient: 24 minutes.  Becca Matthews, MS, RD, LD

## 2021-03-24 NOTE — LETTER
"3/24/2021       RE: Babita Rivas  [address]  [place] [address]     Dear Colleague,    Thank you for referring your patient, Babita Rivas, to the [facility] [place] at [facility]. Please see a copy of my visit note below.    Babita Rivas is a 35 year old who is being evaluated via a billable video visit.      The patient has been notified of following:     \"This video visit will be conducted via a call between you and your physician/provider. We have found that certain health care needs can be provided without the need for an in-person physical exam.  This service lets us provide the care you need with a video conversation.  If a prescription is necessary we can send it directly to your pharmacy.  If lab work is needed we can place an order for that and you can then stop by our lab to have the test done at a later time.    Video visits are billed at different rates depending on your insurance coverage.  Please reach out to your insurance provider with any questions.    If during the course of the call the physician/provider feels a video visit is not appropriate, you will not be charged for this service.\"    Patient has given verbal consent for Video visit? Yes  How would you like to obtain your AVS? MyChart  If you are dropped from the video visit, the video invite should be resent to: Text to cell phone: [phone]  Will anyone else be joining your video visit? No        Video-Visit Details    Type of service:  Video Visit    Video Start Time: 4:07 PM  Video End Time: 4:32 PM    Originating Location (pt. Location): Home    Distant Location (provider location):  [facility] [place]     Platform used for Video Visit: [employer]    During this virtual visit the patient is located in [place], patient verifies this as the location during the entirety of this visit.     Return " "Bariatric Nutrition Consultation Note    Reason For Visit: Nutrition Assessment    Babita Rivas is a 35 year old presenting today for follow-up bariatric nutrition consult.   Pt is interested in laparoscopic sleeve gastrectomy with Dr. Fitzpatrick expected surgery in May 2021.  Patient is accompanied by self.  This is pt's 2nd of 3rd required nutrition visits prior to surgery.     Pt referred by Arlette Corbett NP on February 15, 2021.  Patient with Co-morbidities of obesity including:  Type II DM no  Renal Failure no  Sleep apnea no  Hypertension no   Dyslipidemia no  Joint pain no  Back pain no  GERD no     Support System Reviewed With Patient 2/9/2021   Who do you have in your support network that can be available to help you for the first 2 weeks after surgery? My mother my kids my kids father   Who can you count on for support throughout your weight loss surgery journey? My family       ANTHROPOMETRICS:  Estimated body mass index is 49.78 kg/m  as calculated from the following:    Height as of 2/16/21: 1.626 m (5' 4\").    Weight as of 2/16/21: 131.5 kg (290 lb).    Required weight loss goal pre-op: 10 lbs from initial consult weight (goal weight 280 lbs or less before surgery)       2/9/2021   I have tried the following methods to lose weight Watching portions or calories, Exercise, Slimfast, Prescription Medications       Weight Loss Questions Reviewed With Patient 2/9/2021   How long have you been overweight? From Middle age and beyond       SUPPLEMENT INFORMATION:  None at this time    NUTRITION HISTORY:  Continues to skips meals no consistency in eating pattern.   She had a family member pass away and has been busy with family and reports she has not been focusing on the nutrition goals at this time.         Progress Towards Previous Goals:  Relating To Eating:  Eat slowly (20-30 minutes per meal), chewing foods well (25 chews per bite/applesauce consistency)-Continues   9\" Plate method (1/2 plate non-starchy " vegetables/fruit, 1/4 plate lean protein, 1/4 plate whole grain starch - no more than 1/2 cup carb/meal)- Continues, having a pastry for breakfast.   Eat 3 meals per day-Continues, only eating 1 meal a day.   Avoid snacking-Continues    Relating to beverages:  Reduce caffeine/carbonation/calorie containing beverages-Met   Separate fluid between meals-Met        Recall Diet Questions Reviewed With Patient 2/9/2021   Describe what you typically consume for breakfast (typical or most recent): Nothing or palmer egg sausage   Describe what you typically consume for lunch (typical or most recent): Order out if I m at work   Describe what you typically consume for supper (typical or most recent): Chicken   Describe what you typically consume as snacks (typical or most recent): Chips   How many ounces of water, or other low calorie drinks, do you drink daily (8 oz=1 glass)? 24 oz   How many ounces of caffeine (coffee, tea, pop) do you drink daily (8 oz=1 glass)? 24 oz   How many ounces of carbonated (pop, beer, sparkling water) drinks do you drinky daily (8 oz=1 glass)? 32 oz - pop    How many ounces of juice, pop, sweet tea, sports drinks, protein drinks, other sweetened drinks, do you drink daily (8 oz=1 glass)? 32 oz   How many ounces of milk do you drink daily (8 oz=1 glass) 24 oz   Please indicate the type of milk: 2%   How often do you drink alcohol? 2-4 times a month   If you do drink alcohol, how many drinks might you have in a day? (one drink = 5 oz. wine, 1 can/bottle of beer, 1 shot liquor) 3 or 4       Eating Habits 2/9/2021   Do you have any dietary restrictions? No   Do you currently binge eat (eat a large amount of food in a short time)? No   Are you an emotional eater? No   Do you get up to eat after falling asleep? No   What foods do you crave? Crab legs shrimp       ADDITIONAL INFORMATION:    Dining Out History Reviewed With Patient 2/9/2021   How often do you dine out? Rarely.   Where do you dine out?  "(select all that apply) take out   What types of food do you order when you dine out? Sea food tami dickinson       Physical Activity Reviewed With Patient 2/9/2021   How often do you exercise? 1 to 2 times per week   What is the duration of your exercise (in minutes)? 15 Minutes   What types of exercise do you do? walking   What keeps you from being more active?  Pain         NUTRITION DIAGNOSIS:  Obesity r/t long history of self-monitoring deficit and excessive energy intake aeb BMI >30 kg/m2.    INTERVENTION:  Intervention Provided/Education:   1. Reviewed and modified previous goals  2. Reviewed post-op diet guidelines, vitamins/minerals essential post-operatively, GI anatomy of bariatric surgeries, ways to help prepare for post-op diet guidelines pre-operatively, portion/calorie-control, mindful eating and sources of protein.    3. Discussed readiness for surgery, pt insists she is ready for surgery and would like to have surgery in May. Discussed continuing to work on nutrition goals prior to surgery.    4. AVS via [employer]     Questions Reviewed With Patient 2/9/2021   How ready are you to make changes regarding your weight? Number 1 = Not ready at all to make changes up to 10 = very ready. 10   How confident are you that you can change? 1 = Not confident that you will be successful making changes up to 10 = very confident. 10       Expected Engagement: fair    GOALS:  Relating To Eating:  Eat slowly (20-30 minutes per meal), chewing foods well (25 chews per bite/applesauce consistency)  9\" Plate method (1/2 plate non-starchy vegetables/fruit, 1/4 plate lean protein, 1/4 plate whole grain starch - no more than 1/2 cup carb/meal)  Eat 3 meals per day  Avoid snacking    Relating to beverages:  Reduce caffeine/carbonation/calorie containing beverages     *Protein Shake Criteria: no more than 210 Calories, at least 20 grams of protein, and less than 10 grams of sugar     Meal Replacement Options:   [facility] " smoothie (160 Calories, 20 g protein)   Premier Protein (160 Calories, 30 g protein)  Slim Fast Advanced Nutrition (180 Calories, 20 g protein)  Muscle Milk, lactose-free, 17 oz bottle (210 Calories, 30 g protein)  Integrated Supplements, no artificial sugars (110 Calories, 20 g protein)  Quest Protein Bars (190 Calories, 20 g protein)  No Cow Protein Bar, gluten, dairy, and soy free (200 Calories, 20 g protein)    Initial Consult / Weight Loss    My Plate Planner_English - Pt Education  [email or web address]    Protein Sources for Weight Loss  [email or web address]     Carbohydrates  [email or web address]       Time spent with patient: 24 minutes.  Becca Matthews, MS, RD, LD

## 2021-03-26 NOTE — PATIENT INSTRUCTIONS
"GOALS:  Relating To Eating:  Eat slowly (20-30 minutes per meal), chewing foods well (25 chews per bite/applesauce consistency)  9\" Plate method (1/2 plate non-starchy vegetables/fruit, 1/4 plate lean protein, 1/4 plate whole grain starch - no more than 1/2 cup carb/meal)  Eat 3 meals per day  Avoid snacking    Relating to beverages:  Reduce caffeine/carbonation/calorie containing beverages     *Protein Shake Criteria: no more than 210 Calories, at least 20 grams of protein, and less than 10 grams of sugar     Meal Replacement Options:   [facility] smoothie (160 Calories, 20 g protein)   Premier Protein (160 Calories, 30 g protein)  Slim Fast Advanced Nutrition (180 Calories, 20 g protein)  Muscle Milk, lactose-free, 17 oz bottle (210 Calories, 30 g protein)  Integrated Supplements, no artificial sugars (110 Calories, 20 g protein)  Quest Protein Bars (190 Calories, 20 g protein)  No Cow Protein Bar, gluten, dairy, and soy free (200 Calories, 20 g protein)    Initial Consult / Weight Loss    My Plate Planner_English - Pt Education  [email or web address]    Protein Sources for Weight Loss  [email or web address]     Carbohydrates  [email or web address]   "

## 2021-04-01 ENCOUNTER — TELEPHONE (OUTPATIENT)
Dept: ENDOCRINOLOGY | Facility: CLINIC | Age: 36
End: 2021-04-01

## 2021-04-01 NOTE — TELEPHONE ENCOUNTER
Called [facility] to check if policy has coverage for bariatric surgery, it does.

## 2021-04-20 ENCOUNTER — TELEPHONE (OUTPATIENT)
Dept: ENDOCRINOLOGY | Facility: CLINIC | Age: 36
End: 2021-04-20

## 2021-04-20 NOTE — TELEPHONE ENCOUNTER
Provider was running behind and pt was not on [employer].   Called patient and pt is not available to speak of 9 am appointment as she is at work. RD to have  to reach out and reschedule patient.     Becca Matthews, MS, RD, LD

## 2021-04-21 ENCOUNTER — VIRTUAL VISIT (OUTPATIENT)
Dept: ENDOCRINOLOGY | Facility: CLINIC | Age: 36
End: 2021-04-21
Payer: COMMERCIAL

## 2021-04-21 DIAGNOSIS — E66.01 MORBID OBESITY DUE TO EXCESS CALORIES (H): Primary | ICD-10-CM

## 2021-04-21 DIAGNOSIS — Z71.3 NUTRITIONAL COUNSELING: ICD-10-CM

## 2021-04-21 PROCEDURE — 97803 MED NUTRITION INDIV SUBSEQ: CPT | Mod: 95 | Performed by: DIETITIAN, REGISTERED

## 2021-04-21 NOTE — LETTER
"4/21/2021       RE: Babita Rivas  [address]  [facility] [address]     Dear Colleague,    Thank you for referring your patient, Babita Rivas, to the [facility] [place] at [facility]. Please see a copy of my visit note below.    Babita Rivas is a 35 year old who is being evaluated via a billable video visit.      The patient has been notified of following:     \"This video visit will be conducted via a call between you and your physician/provider. We have found that certain health care needs can be provided without the need for an in-person physical exam.  This service lets us provide the care you need with a video conversation.  If a prescription is necessary we can send it directly to your pharmacy.  If lab work is needed we can place an order for that and you can then stop by our lab to have the test done at a later time.    Video visits are billed at different rates depending on your insurance coverage.  Please reach out to your insurance provider with any questions.    If during the course of the call the physician/provider feels a video visit is not appropriate, you will not be charged for this service.\"    Patient has given verbal consent for Video visit? Yes  How would you like to obtain your AVS? MyChart  If you are dropped from the video visit, the video invite should be resent to: Text to cell phone: [phone]  Will anyone else be joining your video visit? No      Video-Visit Details    Type of service:  Video Visit    Video Start Time: 11:18 AM  Video End Time: 11:36 AM    Originating Location (pt. Location): Home    Distant Location (provider location):  [facility] [place]     Platform used for Video Visit: BonaYou    During this virtual visit the patient is located in [place], patient verifies this as the location during the entirety of this visit.     Return " "Bariatric Nutrition Consultation Note    Reason For Visit: Nutrition Assessment    Babita Rivas is a 35 year old presenting today for follow-up bariatric nutrition consult.   Pt is interested in laparoscopic sleeve gastrectomy with Dr. Fitzpatrick expected surgery in May 2021.  Patient is accompanied by self.  This is pt's 3rd of 3rd required nutrition visits prior to surgery.     Pt referred by Arlette Corbett NP on February 15, 2021.  Patient with Co-morbidities of obesity including:  Type II DM no  Renal Failure no  Sleep apnea no  Hypertension no   Dyslipidemia no  Joint pain no  Back pain no  GERD no     Support System Reviewed With Patient 2/9/2021   Who do you have in your support network that can be available to help you for the first 2 weeks after surgery? My mother my kids my kids father   Who can you count on for support throughout your weight loss surgery journey? My family       ANTHROPOMETRICS:  Estimated body mass index is 49.78 kg/m  as calculated from the following:    Height as of 2/16/21: 1.626 m (5' 4\").    Weight as of 2/16/21: 131.5 kg (290 lb).   Current weight: No recent weight  - Believes she was really 301 lb for initial weight, no weight in chart.     Required weight loss goal pre-op: 10 lbs from initial consult weight (goal weight 280 lbs or less before surgery)       2/9/2021   I have tried the following methods to lose weight Watching portions or calories, Exercise, Slimfast, Prescription Medications       Weight Loss Questions Reviewed With Patient 2/9/2021   How long have you been overweight? From Middle age and beyond       SUPPLEMENT INFORMATION:  None at this time    NUTRITION HISTORY:  Pt concern with all the appointments prior to surgery, again discussed appointments and reviewed task list with patient.     Does not have consistent meal patterns, typically only consumes 2 meals daily.      Diet Recall:  B: Milk  Snack: Banana/ fruit   L: Chicken tenders and salad   D: Crab legs " "with white rice.     Progress Towards Previous Goals:  Relating To Eating:  Eat slowly (20-30 minutes per meal), chewing foods well (25 chews per bite/applesauce consistency)-Improving, goes better home.   9\" Plate method (1/2 plate non-starchy vegetables/fruit, 1/4 plate lean protein, 1/4 plate whole grain starch - no more than 1/2 cup carb/meal)-  Eat 3 meals per day-Continues   Avoid snacking-Improving    Relating to beverages:  Reduce caffeine/carbonation/calorie containing beverages -Improving, water, small amount of orange juice, sparkling water.        Recall Diet Questions Reviewed With Patient 2/9/2021   Describe what you typically consume for breakfast (typical or most recent): Nothing or palmer egg sausage   Describe what you typically consume for lunch (typical or most recent): Order out if I m at work   Describe what you typically consume for supper (typical or most recent): Chicken   Describe what you typically consume as snacks (typical or most recent): Chips   How many ounces of water, or other low calorie drinks, do you drink daily (8 oz=1 glass)? 24 oz   How many ounces of caffeine (coffee, tea, pop) do you drink daily (8 oz=1 glass)? 24 oz   How many ounces of carbonated (pop, beer, sparkling water) drinks do you drinky daily (8 oz=1 glass)? 32 oz - pop    How many ounces of juice, pop, sweet tea, sports drinks, protein drinks, other sweetened drinks, do you drink daily (8 oz=1 glass)? 32 oz   How many ounces of milk do you drink daily (8 oz=1 glass) 24 oz   Please indicate the type of milk: 2%   How often do you drink alcohol? 2-4 times a month   If you do drink alcohol, how many drinks might you have in a day? (one drink = 5 oz. wine, 1 can/bottle of beer, 1 shot liquor) 3 or 4       Eating Habits 2/9/2021   Do you have any dietary restrictions? No   Do you currently binge eat (eat a large amount of food in a short time)? No   Are you an emotional eater? No   Do you get up to eat after falling " "asleep? No   What foods do you crave? Crab legs shrimp       ADDITIONAL INFORMATION:    Dining Out History Reviewed With Patient 2/9/2021   How often do you dine out? Rarely.   Where do you dine out? (select all that apply) take out   What types of food do you order when you dine out? Sea food pasta teena       Physical Activity Reviewed With Patient 2/9/2021   How often do you exercise? 1 to 2 times per week   What is the duration of your exercise (in minutes)? 15 Minutes   What types of exercise do you do? walking   What keeps you from being more active?  Pain         NUTRITION DIAGNOSIS:  Obesity r/t long history of self-monitoring deficit and excessive energy intake aeb BMI >30 kg/m2.    INTERVENTION:  Intervention Provided/Education:   1. Reviewed and modified previous goals  2. Reviewed post-op diet guidelines, vitamins/minerals essential post-operatively, GI anatomy of bariatric surgeries, ways to help prepare for post-op diet guidelines pre-operatively, portion/calorie-control, mindful eating and sources of protein.    3. Reviewed task list with patient, dicussed and reminded patient of upcoming appointments.   4. AVS via [employer]     Questions Reviewed With Patient 2/9/2021   How ready are you to make changes regarding your weight? Number 1 = Not ready at all to make changes up to 10 = very ready. 10   How confident are you that you can change? 1 = Not confident that you will be successful making changes up to 10 = very confident. 10       Expected Engagement: fair    GOALS:  Relating To Eating:  Eat slowly (20-30 minutes per meal), chewing foods well (25 chews per bite/applesauce consistency)  9\" Plate method (1/2 plate non-starchy vegetables/fruit, 1/4 plate lean protein, 1/4 plate whole grain starch - no more than 1/2 cup carb/meal)  Eat 3 meals per day  - Try a protein shake for breakfast (See protein shake options below)   Avoid snacking    Relating to beverages:  Reduce caffeine/carbonation/calorie " containing beverages     *Protein Shake Criteria: no more than 210 Calories, at least 20 grams of protein, and less than 10 grams of sugar     Meal Replacement Options:    Health C smoothie (160 Calories, 20 g protein)   Premier Protein (160 Calories, 30 g protein)  Slim Fast Advanced Nutrition (180 Calories, 20 g protein)  Muscle Milk, lactose-free, 17 oz bottle (210 Calories, 30 g protein)  Integrated Supplements, no artificial sugars (110 Calories, 20 g protein)  Quest Protein Bars (190 Calories, 20 g protein)  No Cow Protein Bar, gluten, dairy, and soy free (200 Calories, 20 g protein)    Initial Consult / Weight Loss    My Plate Planner_English - Pt Education  [email or web address]    Protein Sources for Weight Loss  [email or web address]     Carbohydrates  [email or web address]       Time spent with patient: 18 minutes.  Becca Matthews, MS, RD, LD

## 2021-04-21 NOTE — PATIENT INSTRUCTIONS
"GOALS:  Relating To Eating:  Eat slowly (20-30 minutes per meal), chewing foods well (25 chews per bite/applesauce consistency)  9\" Plate method (1/2 plate non-starchy vegetables/fruit, 1/4 plate lean protein, 1/4 plate whole grain starch - no more than 1/2 cup carb/meal)  Eat 3 meals per day  - Try a protein shake for breakfast (See protein shake options below)   Avoid snacking    Relating to beverages:  Reduce caffeine/carbonation/calorie containing beverages     *Protein Shake Criteria: no more than 210 Calories, at least 20 grams of protein, and less than 10 grams of sugar     Meal Replacement Options:   [facility] smoothie (160 Calories, 20 g protein)   Premier Protein (160 Calories, 30 g protein)  Slim Fast Advanced Nutrition (180 Calories, 20 g protein)  Muscle Milk, lactose-free, 17 oz bottle (210 Calories, 30 g protein)  Integrated Supplements, no artificial sugars (110 Calories, 20 g protein)  Quest Protein Bars (190 Calories, 20 g protein)  No Cow Protein Bar, gluten, dairy, and soy free (200 Calories, 20 g protein)    Initial Consult / Weight Loss    My Plate Planner_English - Pt Education  [email or web address]    Protein Sources for Weight Loss  [email or web address]     Carbohydrates  [email or web address]     "

## 2021-04-21 NOTE — PROGRESS NOTES
"Babita Rivas is a 35 year old who is being evaluated via a billable video visit.      The patient has been notified of following:     \"This video visit will be conducted via a call between you and your physician/provider. We have found that certain health care needs can be provided without the need for an in-person physical exam.  This service lets us provide the care you need with a video conversation.  If a prescription is necessary we can send it directly to your pharmacy.  If lab work is needed we can place an order for that and you can then stop by our lab to have the test done at a later time.    Video visits are billed at different rates depending on your insurance coverage.  Please reach out to your insurance provider with any questions.    If during the course of the call the physician/provider feels a video visit is not appropriate, you will not be charged for this service.\"    Patient has given verbal consent for Video visit? Yes  How would you like to obtain your AVS? MyChart  If you are dropped from the video visit, the video invite should be resent to: Text to cell phone: [phone]  Will anyone else be joining your video visit? No      Video-Visit Details    Type of service:  Video Visit    Video Start Time: 11:18 AM  Video End Time: 11:36 AM    Originating Location (pt. Location): Home    Distant Location (provider location):  [facility] [place]     Platform used for Video Visit: Clear Image Technology    During this virtual visit the patient is located in [place], patient verifies this as the location during the entirety of this visit.     Return Bariatric Nutrition Consultation Note    Reason For Visit: Nutrition Assessment    Babita Rivas is a 35 year old presenting today for follow-up bariatric nutrition consult.   Pt is interested in laparoscopic sleeve gastrectomy with Dr. Fitzpatrick expected surgery in May 2021.  Patient is accompanied by self.  This is pt's 3rd of 3rd " "required nutrition visits prior to surgery.     Pt referred by Arlette Corbett NP on February 15, 2021.  Patient with Co-morbidities of obesity including:  Type II DM no  Renal Failure no  Sleep apnea no  Hypertension no   Dyslipidemia no  Joint pain no  Back pain no  GERD no     Support System Reviewed With Patient 2/9/2021   Who do you have in your support network that can be available to help you for the first 2 weeks after surgery? My mother my kids my kids father   Who can you count on for support throughout your weight loss surgery journey? My family       ANTHROPOMETRICS:  Estimated body mass index is 49.78 kg/m  as calculated from the following:    Height as of 2/16/21: 1.626 m (5' 4\").    Weight as of 2/16/21: 131.5 kg (290 lb).   Current weight: No recent weight  - Believes she was really 301 lb for initial weight, no weight in chart.     Required weight loss goal pre-op: 10 lbs from initial consult weight (goal weight 280 lbs or less before surgery)       2/9/2021   I have tried the following methods to lose weight Watching portions or calories, Exercise, Slimfast, Prescription Medications       Weight Loss Questions Reviewed With Patient 2/9/2021   How long have you been overweight? From Middle age and beyond       SUPPLEMENT INFORMATION:  None at this time    NUTRITION HISTORY:  Pt concern with all the appointments prior to surgery, again discussed appointments and reviewed task list with patient.     Does not have consistent meal patterns, typically only consumes 2 meals daily.      Diet Recall:  B: Milk  Snack: Banana/ fruit   L: Chicken tenders and salad   D: Crab legs with white rice.     Progress Towards Previous Goals:  Relating To Eating:  Eat slowly (20-30 minutes per meal), chewing foods well (25 chews per bite/applesauce consistency)-Improving, goes better home.   9\" Plate method (1/2 plate non-starchy vegetables/fruit, 1/4 plate lean protein, 1/4 plate whole grain starch - no more than 1/2 cup " carb/meal)-  Eat 3 meals per day-Continues   Avoid snacking-Improving    Relating to beverages:  Reduce caffeine/carbonation/calorie containing beverages -Improving, water, small amount of orange juice, sparkling water.        Recall Diet Questions Reviewed With Patient 2/9/2021   Describe what you typically consume for breakfast (typical or most recent): Nothing or palmer egg sausage   Describe what you typically consume for lunch (typical or most recent): Order out if I m at work   Describe what you typically consume for supper (typical or most recent): Chicken   Describe what you typically consume as snacks (typical or most recent): Chips   How many ounces of water, or other low calorie drinks, do you drink daily (8 oz=1 glass)? 24 oz   How many ounces of caffeine (coffee, tea, pop) do you drink daily (8 oz=1 glass)? 24 oz   How many ounces of carbonated (pop, beer, sparkling water) drinks do you drinky daily (8 oz=1 glass)? 32 oz - pop    How many ounces of juice, pop, sweet tea, sports drinks, protein drinks, other sweetened drinks, do you drink daily (8 oz=1 glass)? 32 oz   How many ounces of milk do you drink daily (8 oz=1 glass) 24 oz   Please indicate the type of milk: 2%   How often do you drink alcohol? 2-4 times a month   If you do drink alcohol, how many drinks might you have in a day? (one drink = 5 oz. wine, 1 can/bottle of beer, 1 shot liquor) 3 or 4       Eating Habits 2/9/2021   Do you have any dietary restrictions? No   Do you currently binge eat (eat a large amount of food in a short time)? No   Are you an emotional eater? No   Do you get up to eat after falling asleep? No   What foods do you crave? Crab legs shrimp       ADDITIONAL INFORMATION:    Dining Out History Reviewed With Patient 2/9/2021   How often do you dine out? Rarely.   Where do you dine out? (select all that apply) take out   What types of food do you order when you dine out? Sea food tami dickinson       Physical Activity Reviewed  "With Patient 2/9/2021   How often do you exercise? 1 to 2 times per week   What is the duration of your exercise (in minutes)? 15 Minutes   What types of exercise do you do? walking   What keeps you from being more active?  Pain         NUTRITION DIAGNOSIS:  Obesity r/t long history of self-monitoring deficit and excessive energy intake aeb BMI >30 kg/m2.    INTERVENTION:  Intervention Provided/Education:   1. Reviewed and modified previous goals  2. Reviewed post-op diet guidelines, vitamins/minerals essential post-operatively, GI anatomy of bariatric surgeries, ways to help prepare for post-op diet guidelines pre-operatively, portion/calorie-control, mindful eating and sources of protein.    3. Reviewed task list with patient, dicussed and reminded patient of upcoming appointments.   4. AVS via [employer]     Questions Reviewed With Patient 2/9/2021   How ready are you to make changes regarding your weight? Number 1 = Not ready at all to make changes up to 10 = very ready. 10   How confident are you that you can change? 1 = Not confident that you will be successful making changes up to 10 = very confident. 10       Expected Engagement: fair    GOALS:  Relating To Eating:  Eat slowly (20-30 minutes per meal), chewing foods well (25 chews per bite/applesauce consistency)  9\" Plate method (1/2 plate non-starchy vegetables/fruit, 1/4 plate lean protein, 1/4 plate whole grain starch - no more than 1/2 cup carb/meal)  Eat 3 meals per day  - Try a protein shake for breakfast (See protein shake options below)   Avoid snacking    Relating to beverages:  Reduce caffeine/carbonation/calorie containing beverages     *Protein Shake Criteria: no more than 210 Calories, at least 20 grams of protein, and less than 10 grams of sugar     Meal Replacement Options:   [facility] smoothie (160 Calories, 20 g protein)   Premier Protein (160 Calories, 30 g protein)  Slim Fast Advanced Nutrition (180 Calories, 20 g protein)  Muscle Milk, " lactose-free, 17 oz bottle (210 Calories, 30 g protein)  Integrated Supplements, no artificial sugars (110 Calories, 20 g protein)  Quest Protein Bars (190 Calories, 20 g protein)  No Cow Protein Bar, gluten, dairy, and soy free (200 Calories, 20 g protein)    Initial Consult / Weight Loss    My Plate Planner_English - Pt Education  [email or web address]    Protein Sources for Weight Loss  [email or web address]     Carbohydrates  [email or web address]       Time spent with patient: 18 minutes.  Becca Matthews, MS, RD, LD

## 2021-04-22 ENCOUNTER — VIRTUAL VISIT (OUTPATIENT)
Dept: ENDOCRINOLOGY | Facility: CLINIC | Age: 36
End: 2021-04-22
Payer: COMMERCIAL

## 2021-04-22 VITALS — WEIGHT: 293 LBS | HEIGHT: 62 IN | BODY MASS INDEX: 53.92 KG/M2

## 2021-04-22 DIAGNOSIS — Z91.199 NO-SHOW FOR APPOINTMENT: Primary | ICD-10-CM

## 2021-04-22 ASSESSMENT — MIFFLIN-ST. JEOR: SCORE: 1981.36

## 2021-04-22 ASSESSMENT — PAIN SCALES - GENERAL: PAINLEVEL: NO PAIN (0)

## 2021-04-22 NOTE — PROGRESS NOTES
Links sent to visit starting at 1419. Called and spoke to patient at 1423- patient was about to log onto [employer]- running late from work. Waiting in video chat until 1440 (25min after start of visit). Patient not seen today. Chart opened and prepped in anticipation of visit. Patient will need to reschedule.     Arlette Corbett CNP  [facility] [place]       
Declined

## 2021-04-22 NOTE — LETTER
Date:April 25, 2021      Provider requested that no letter be sent. Do not send.       [facility] [place] [facility]

## 2021-04-22 NOTE — LETTER
4/22/2021       RE: Babita Rivas  [address]  [place] [address]     Dear Colleague,    Thank you for referring your patient, Babita Rivas, to the [facility] [place] at [facility]. Please see a copy of my visit note below.    Links sent to visit starting at 1419. Called and spoke to patient at 1423- patient was about to log onto [employer]- running late from work. Waiting in video chat until 1440 (25min after start of visit). Patient not seen today. Chart opened and prepped in anticipation of visit. Patient will need to reschedule.     Arlette Corbett CNP  [facility] [place]                 Again, thank you for allowing me to participate in the care of your patient.      Sincerely,    Arlette Corbett, NP

## 2021-04-29 ENCOUNTER — VIRTUAL VISIT (OUTPATIENT)
Dept: ENDOCRINOLOGY | Facility: CLINIC | Age: 36
End: 2021-04-29
Payer: COMMERCIAL

## 2021-04-29 VITALS — HEIGHT: 62 IN | BODY MASS INDEX: 53.96 KG/M2

## 2021-04-29 DIAGNOSIS — E66.01 MORBID OBESITY DUE TO EXCESS CALORIES (H): Primary | ICD-10-CM

## 2021-04-29 PROCEDURE — 99213 OFFICE O/P EST LOW 20 MIN: CPT | Mod: 95 | Performed by: SURGERY

## 2021-04-29 ASSESSMENT — PAIN SCALES - GENERAL: PAINLEVEL: NO PAIN (0)

## 2021-04-29 NOTE — LETTER
"4/29/2021       RE: Babita Rivas  [address]  [facility] [address]     Dear Colleague,    Thank you for referring your patient, Babita Rivas, to the [facility] [place] [facility]. Please see a copy of my visit note below.    Babita is a 36 year old who is being evaluated via a billable video visit.      How would you like to obtain your AVS? MyChart  If the video visit is dropped, the invitation should be resent by: Text to cell phone: [phone]  Will anyone else be joining your video visit? No    Video Start Time: 1:35 PM  Video-Visit Details    Type of service:  Video Visit    Video End Time:1:56 PM    Originating Location (pt. Location): Home    Distant Location (provider location):  [facility]     Platform used for Video Visit: Waqas         Dear Dr. Pascual,    I had the pleasure of meeting with your patient Babita Rivas in our weight loss surgery office.  This patient is a 36 year old female who has been undergoing our thorough preoperative screening process in anticipation of potential bariatric surgery.    At initial evaluation we recorded Babita Rivas's Height: 157.5 cm (5' 2\"), Initial Weight (lbs): 290 lbs and Initial BMI: 49.78.  The patient has been unsuccessful with other methods of permanent weight loss and suffers from multiple weight related medical conditions.  Due to lack of success in achieving weight loss through other methods, she is interested in undergoing bariatric surgery.    PREVIOUS WEIGHT LOSS ATTEMPTS:     2/9/2021   I have tried the following methods to lose weight Watching portions or calories, Exercise, Slimfast, Prescription Medications       CO-MORBIDITIES OF OBESITY INCLUDE:     2/9/2021   I have the following health issues associated with obesity: Asthma     Has been interested in surgery for a long " "time.    Some difficulty working; problems with back pain.    Works as CNA and some troubles with lifting.    Has also had more problems with weight affecting ability to do every day activities at home and has affected family life in some ways.    VITALS:  Ht 1.575 m (5' 2\")   BMI 53.96 kg/m      PE:  GENERAL: Alert and oriented x3. NAD  HEENT exam: Sclerae not icteric. Hearing good. Head normocephalic and atraumatic.   Rest of exam deferred due to virtual visit      In summary, she has undergone an appropriate medical evaluation, dietitian evaluation, as well as psychologic screening. The patient appears to be an appropriate candidate for bariatric surgery.    In the office today, I discussed the laparoscopic gastric sleeve surgery.  Risks, benefits and anticipated outcomes were outlined including the risk of death, staple line leak (1-2%), PE, DVT, ulcer, worsening GERD, N/V, stricture, hernia, wound infection, weight regain, and vitamin deficiencies. This patient has a good chance of sustaining permanent weight loss due to this procedure.  This should also allow improvement if not resolution of his/her weight related medical conditions.    At present we are going to present your patient's file for prior authorization to insurance.  Pending prior authorization, I anticipate a surgical date in the near future.  We will keep you updated on any progress.  If you have questions regarding care please feel free to contact me.  Total time spent in the clinic was 25 minutes with greater than 50% in face-to-face consultation.        Sincerely,    Ky Fitzpatrick MD    Please route or send letter to:  Primary Care Provider (PCP) and Referring Provider    "

## 2021-04-29 NOTE — NURSING NOTE
"Chief Complaint   Patient presents with     RECHECK     Meet and greet appt.       Vitals:    04/29/21 1257   Height: 1.575 m (5' 2\")       Body mass index is 53.96 kg/m .                            AJ JONES, EMT    "

## 2021-04-30 ENCOUNTER — VIRTUAL VISIT (OUTPATIENT)
Dept: ENDOCRINOLOGY | Facility: CLINIC | Age: 36
End: 2021-04-30
Payer: COMMERCIAL

## 2021-04-30 VITALS — BODY MASS INDEX: 53.37 KG/M2 | WEIGHT: 290 LBS | HEIGHT: 62 IN

## 2021-04-30 DIAGNOSIS — E66.813 CLASS 3 SEVERE OBESITY DUE TO EXCESS CALORIES WITH SERIOUS COMORBIDITY AND BODY MASS INDEX (BMI) OF 50.0 TO 59.9 IN ADULT (H): ICD-10-CM

## 2021-04-30 DIAGNOSIS — E66.01 MORBID OBESITY DUE TO EXCESS CALORIES (H): Primary | ICD-10-CM

## 2021-04-30 DIAGNOSIS — E66.01 CLASS 3 SEVERE OBESITY DUE TO EXCESS CALORIES WITH SERIOUS COMORBIDITY AND BODY MASS INDEX (BMI) OF 50.0 TO 59.9 IN ADULT (H): ICD-10-CM

## 2021-04-30 PROCEDURE — 99214 OFFICE O/P EST MOD 30 MIN: CPT | Mod: 95 | Performed by: NURSE PRACTITIONER

## 2021-04-30 RX ORDER — TOPIRAMATE 25 MG/1
TABLET, FILM COATED ORAL
Qty: 90 TABLET | Refills: 1 | Status: SHIPPED | OUTPATIENT
Start: 2021-04-30 | End: 2021-11-24 | Stop reason: SINTOL

## 2021-04-30 ASSESSMENT — MIFFLIN-ST. JEOR: SCORE: 1958.68

## 2021-04-30 ASSESSMENT — PAIN SCALES - GENERAL: PAINLEVEL: NO PAIN (0)

## 2021-04-30 NOTE — PROGRESS NOTES
Babita is a 36 year old who is being evaluated via a billable video visit.      How would you like to obtain your AVS? MyChart  If the video visit is dropped, the invitation should be resent by: Text to cell phone: [phone]  Will anyone else be joining your video visit? No      Video Start Time: 2:13 PM  Video-Visit Details    Type of service:  Video Visit    Video End Time:2:37 PM    Originating Location (pt. Location): Home    Distant Location (provider location):  [facility] WEIGHT MANAGEMENT [facility]     Platform used for Video Visit: [employer]       Pre-Bariatric Surgery Note    Elizabeth Pascual    Date: 2021     RE: Babita Rivas    MR#: [id number]   : 1985   Date of Visit: 2021    REASON FOR VISIT: Preoperative evaluation for possible weight loss surgery    Dear Elizabeth Gurrola,    I had the pleasure of seeing your patient, Babita Rivas, in my preoperative bariatric clinic.    As you know, she is morbidly obese and considering weight loss surgery to treat obesity in association with her medical conditions of obesity.  Her consult weight was 290.  She has lost 0 pounds since her consult weight. She has not met her required pre-surgery weight. Please refer to initial consult note from date 2021 for patient's weight history and co-morbidities.    At consult, had disorganized eating pattern. Not able to identify any hunger/ cravings. deferred AOM at that time.     Labs not done - will get done next week     Increased fruit. Working on more structured meals. Struggles with breakfast- trying banana or fruit cups. Extending meals out for longer amount of time.   Breakfast- late morning banana or other fruit - sometimes still skips   Lunch- 12 - piece of peanut butter bread and tries to get a piece of meat - some times still skips   Snack after work 2-3pm - sometimes cooks, sometimes orders in - big meal   After that- sometimes chips, cup a noodles, pizza rolls      Craves chips/ popcorn- usually later in the day - 7-8pm       With changes in diet hasn't seen any increase in hunger/ cravings. Hasn't seen any changes in clothing fitting or weight. Has f/u appointment to get weight check coming up.     Psych scheduled 2021     Assessment & Plan   Problem List Items Addressed This Visit        Digestive    Class 3 severe obesity due to excess calories with serious comorbidity and body mass index (BMI) of 50.0 to 59.9 in adult (H)     Working on diet changes since last seen. Unsure if she has lost weight, plans to check weight next week. Has not gotten labs done, going to do this on Monday. Would like to talk about weight loss medication. Some snacking, more in the evening. Craves chips and salty foods. Will start topiramate, no hx of kidney stones or glaucoma, s/p tubal ligation.          Relevant Medications    topiramate (TOPAMAX) 25 MG tablet      Other Visit Diagnoses     Morbid obesity due to excess calories (H)    -  Primary    Relevant Medications    topiramate (TOPAMAX) 25 MG tablet           30 minutes spent on the date of the encounter doing chart review, history and exam, documentation and further activities per the note  [id number]}  MEDICATIONS:        - Start taking topiramate- ramp to 75mg        - Continue other medications without change  FUTURE LABS:       - Schedule a fasting blood draw  FUTURE APPOINTMENTS:       - Follow-up visit in 2 months     Reviewed tasklist with patient     Most recent weights:  Wt Readings from Last 4 Encounters:   21 131.5 kg (290 lb)   21 133.8 kg (295 lb)   21 131.5 kg (290 lb)         ROS    No past medical history on file.    Past Surgical History:   Procedure Laterality Date      SECTION      x 2     TUBAL LIGATION         Current Outpatient Medications   Medication     topiramate (TOPAMAX) 25 MG tablet     No current facility-administered medications for this visit.        No Known Allergies    No  "results found for any previous visit.       PHYSICAL EXAM:  Objective    Ht 1.575 m (5' 2\")   Wt 131.5 kg (290 lb)   BMI 53.04 kg/m    Vitals - Patient Reported  Pain Score: No Pain (0)        Physical Exam   GENERAL: Healthy, alert and no distress  EYES: Eyes grossly normal to inspection.  No discharge or erythema, or obvious scleral/conjunctival abnormalities.  RESP: No audible wheeze, cough, or visible cyanosis.  No visible retractions or increased work of breathing.    SKIN: Visible skin clear. No significant rash, abnormal pigmentation or lesions.  NEURO: Cranial nerves grossly intact.  Mentation and speech appropriate for age.  PSYCH: Mentation appears normal, affect normal/bright, judgement and insight intact, normal speech and appearance well-groomed.    Sleeve Gastrectomy: Risks and Side Effects    The complications or risks of surgery include but are not limited to: death, heart attack, infection in the surgical site (wound infection), abdomen (abscess), bladder (urinary tract infection), lungs (pneumonia), clots in legs (deep vein thrombosis) or lungs (pulmonary emboli),  injury to the bowels or other organs, bowel obstruction, hernia at the incision and gastrointestional bleeding.    More specific risks related to vertical sleeve gastrectomy were detailed at the bariatric informational seminar and include the following: leak at the vertical sleeve staple line, leak at the anastomoses,  nausea, vomiting, and dehydration for several months,  adhesions causing bowel obstruction, rapid weight loss causing a higher rate of gallstone formation during the first 6 months after surgery, decreased absorption of vitamins and protein because of the reduced stomach size, weight regain if inappropriate food intake occurs, stricture, injury to other organs, hernia,  and ulcers.       Side effects of bariatric surgery include but are not limited to: abdominal pain, cramping, bloating, constipation, nausea, vomiting, " diarrhea, difficulty swallowing,  dehydration, hair loss, excess skin, protein, iron and vitamin deficiencies, heartburn, transfer of addictions, increased anxiety and worsening depression.       I emphasized exercise and activity behavior along with appropriate food choice as the main foundation for weight loss with surgery providing surgical reinforcement of the appropriate behavior set.        Review of general surgery weight loss process    1. Complete preoperative requirements, including weight loss.  Final weight check to confirm MANDATORY weight loss requirement must be documented on a clinic scale.    2. Discuss prior authorization with .    3. History and physical evaluation by PCP of PAC clinic within 30 days of surgery date, preoperative class, and weight check (weigh-in visit) to be scheduled by patient.  Pre-anesthesia clinic for risk evaluation to be scheduled by anesthesia clinic.    4. We cannot guarantee that patient will qualify for surgery unless all preoperative requirements are met, prior authorization from primary insurance company is granted, and insurance changes do not occur.    5. It is possible for patients to regain all weight after weight loss surgery unless they follow guidelines prescribed by our bariatric center.    6. All patients with gastrointestinal complaints after weight loss surgery must have complaints conveyed to the bariatric team for appropriate treatment.    7. Vitamin deficiencies may develop post-bariatric surgery and annual laboratory testing should be performed.    8. Persistent nausea/vomiting after bariatric surgery entails risk of thiamine deficiency and should be treated early.  Vitamin B12 deficiency may develop, especially after gastric bypass surgery and must be recognized.        If you have any questions about our plans please don't hesitate to contact me.    Sincerely,    Arlette Corbett NP

## 2021-04-30 NOTE — PATIENT INSTRUCTIONS
"It was a pleasure meeting with you today.    Thank you for allowing us the privilege of caring for you. We hope we provided you with the excellent service you deserve.   Please let us know if there is anything else we can do for you so that we can be sure you are leaving completely satisfied with your care experience.    To ensure the quality of our services you may be receiving a patient satisfaction survey from an independent patient satisfaction monitoring company.    The greatest compliment you can give is a \"Likely to Recommend\"      Your visit was with Arlette Corbett NP today.     Instructions per today's visit:     MEDICATIONS:        - Start taking topiramate- ramp to 75mg        - Continue other medications without change  FUTURE LABS:       - Schedule a fasting blood draw  FUTURE APPOINTMENTS:       - Follow-up visit in 2 months       To schedule appointments with our team, please call [phone] option #1    Please call during clinic hours Monday through Friday 8:00a - 4:00p if you have questions or you can contact us via Hospicel[employer] at anytime.      Nurses: [phone]  Fax: [phone]  Surgery Scheduler: [phone]    Please call the hospital at [phone] to speak with our on call MDs if you have urgent needs after hours, during weekends, or holidays.    **Please note if you need to go to the Emergency Room for any reason in the first 90 days after surgery it is important to go to the [facility] ER on the [place] on [facility] off of the [address] off [address]    *For patients who are currently enrolled in our program and have not yet had weight loss surgery please note*:    You must be at your required goal weight at the time of your Anesthesia clinic visit as well as the day of surgery or your surgery will be canceled. You will not be able to obtain a new surgery date until you have met your goal weight.    Lab results will be communicated through My Chart or letter (if My Chart not " used). Please call the clinic if you have not received communication after 1 week or if you have any questions.?    Main follow-up parameters for all bariatric patients     Patients who have undergone Bariatric surgery:    1. Follow-up interval during the first year: ~1 week, 1 month, 3 month, 6 month,12 months.  Every 6 months until 5 years; and then annually thereafter.  The goal of follow-up sessions is to ensure that food intake behavior (choice of food and eating speed) and exercise/activity level are set appropriately.    2. Yearly lab tests ordered by our clinic.      3. The bariatric team should be aware and potentially evaluate all adverse gastrointestinal symptoms (dysphagia, abdominal pain, nausea, vomiting, diarrhea, heartburn, reflux, etc), which can be a sign of complication.  The bariatric surgeons perform general surgery procedures in addition to bariatric surgery (laparoscopic cholecystectomy, etc.)    4. Inability to tolerate textured food (chicken, steak, fish, eggs, beans) is NOT normal and may need to be evaluated by endoscopy performed by the bariatric surgeon.    _____________________________________________________________________________________________________________________________    Meal Replacement Products:    Here is the link to our new e-store where you can purchase our meal replacement products    [facility] E-Store  [email or web address]    The one week starter kit is a great way to sample a variety of products and see what works for you.    If you want more information about the product go to: MediaWheel    Free Shipping for orders over $75     Benefits of meal replacements products:    Portion and calorie control  Improved nutrition  Structured eating  Simplified food choices  Avoid contact with trigger foods  ______________________________________________________________________________________________________________________________    Healthy  Lifestyle Support Group   [facility]  Virtual Support Group Now Available    60 minutes of small group guided discussion, support and resources    Led by Health , Kira Hartley    For questions, and to receive the invite information, contact Kira at [email or web address]    All our welcome!    One Friday per month, 12:30pm to 1:30pm  SELF COMPASSION: July 31st  CREATING MY WELLNESS VISION: August 28th  MINDFULNESS PRACTICES FOR A CALM BODY & MIND: September 25th  MINDFUL EATING TOOLS: October 30th  HEALTHY& HAPPY HOLIDAYS: November 20th  OPEN FORUM: December 18th  _______________________________________________________________________________________________________________________________    Connections: Bariatric Care support group  Due to the Covid-19 restrictions, we will be doing our support group virtually using Microsoft Teams. You will need to get an invitation to the group from Abel Elizabeth, Ph.D., LP at [email or web address] and to learn about using Microsoft Teams. The next meeting is on Tuesday, July 14 between 6:30 and 8 PM and there will be no formal speaker.    This group meets the second Tuesday of each month from 6:30 to 8 PM and will be held again at [facility] in the [address] [place] [address] (A-B room) when the Covid-19 restrictions are lifted. The group is led by Abel Elizabeth, Ph.D., Licensed Psychologist, [facility]. There is no cost for group participation and is open to all [facility] (and those external to this program) pre- and post-operative bariatric surgery patients as well as their support system.   _________________________________________________________________________________________________________________________________      Interested in working with a health ?  Health coaches work with you to improve your overall health and wellbeing.  They look at the whole  person, and may involve discussion of different areas of life, including, but not limited to the four pillars of health (sleep, exercise, nutrition, and stress management). Discuss with your care team if you would like to start working a health .     Health Coaching-3 Pack:      $99 for three health coaching visits    Visits may be done in person or via phone    Coaching is a partnership between the  and the client; Coaches do not prescribe or diagnose    Coaching helps inspire the client to reach his/her personal goals   __________________________________________________________________________________________________________________________________    [facility] [facility] Get Moving Program    Our team of physical therapists is trained to help you understand and take control of your condition. They will perform a thorough evaluation to determine your ability for activity and develop a customized plan to fit your goals and physical ability.  Scheduling: Unsure if the Get Moving program is right for you? Discuss the program with your medical provider or diabetes educator. You can also call us at [phone] to ask questions or schedule an appointment.   [facility] Get Moving Program  ______________________________________________________________________________________________________________________  Bluetooth Scale:    We hope to provide you with high quality telephone and virtual healthcare visits while social distancing for COVID-19 is necessary, as well as in the future when virtual visits may be more convenient for you.     Our technology team made it possible for Bluetooth scales to send weight measurements to our electronic medical record. This allows weights from you weighing at home to securely flow into the medical record, which will improve telephone and virtual visits.   Additionally, studies have shown that adults actually lose more weight when their weights are automatically sent to  someone else, and also that this process is not stressful for those adults.    Below is a link for purchasing the scale, with a discount code for our patients. You may call your insurance company to see if they will reimburse you for the cost of the scale, as a piece of durable medical equipment. The scales only go up to a weight of 400 pounds. This is an issue and we are working with the developer on increasing this. We found no scales that go over 400lb that have blue-tooth for connecting to KS12.    Scale to purchase: the Dashlane  Body  Scale: [email or web address]=aw.ds    Discount Code: We have a discount code for our patients to bring the cost down to $50, the code is: [place]_Scale_20%off    Steps to link the scale to KS12 via an Android Phone (you can always disconnect at any point in the future):  1. The order must be placed first before the patient can access [facility] [facility] within KS12.  2. Download Google Fit sam from the Google Play Store   a. Log in or register using your Google account   3. Download the KS12 sam from Google Play Store  a. Select add organization   b. Search for P[employer]Spot and select it   c. Log into KS12  d. Select Track My Health   e. Select the green connect my account button   f. When prompted log into your Google account   g. Select okay to confirm the account   4. Download the [employer] Health Mate sam from Google Play Store  a. [place] for Dashlane   b. Go to profile   c. Tap google fit under the Apps section  d. Select the option to activate Google Fit integration   e. Select the same Google account   f. Select okay to confirm the account  g.   Steps to link the scale to KS12 via an iPhone (you can always disconnect at any point in the future):  **Note [employer] is not available for download on an iPad**  1. The order must be placed first before the patient can access  Track My Health within NetScaler.  2. Locate the Health armand on your iPhone.  a. Set up your Apple Health account as prompted  b. The Sources page will show Apps that communicate with your Health armand. Once all steps are completed, you should see MedyMatch and Graph[employer] Softwaret listed under the Apps section and your iPhone under the devices section.  i. Select Health Mate  1. Under 'ALLOW  [place] MATE  TO WRITE DATA ensure the toggle is on for Weight.  2. This will allow the scale to add your weight to the [employer] Health  ii. Select MyChart  1. Under 'ALLOW  sezmi  TO READ DATA ensure the toggle is on for Weight.  2. This allows NetScaler to grab the weight from Wow[employer]acy so your provider can see your weights.  3. Download the NetScaler armand from the Armand Store   a. Select add organization                                                  b. Search for [employer] and select it  c. Log into NetScaler  d. Select Track My Health   e. Select the green connect my account button   f. Follow prompts to link your device to NetScaler.  4. Download the Withings health mate armand in the Armand Store   a. [place] for SupportPay   b. Go to profile   c. Tap Health under the Apps section  d. If prompted to allow access with the Health Armand, toggle weight on for read and write access.         MEDICATION STARTED AT THIS APPOINTMENT  We are starting topiramate at bedtime.  Start one tab, 25 mg, for a week. Go up to 50 mg (2 tabs) for the next week. At the third week, take  3 tabs (75 mg).  Stay at 3 tabs until you are seen again.     For any questions/concerns contact Carmina Cavazos LPN at [phone]    (Do not stop taking it if you don't think it's working. For some people it works even though they do not feel much different.)    Topiramate (Topamax) is a medication that is used most often to treat migraine headaches or for seizures. It has also been found to help with weight loss. Although it's not currently FDA approved for weight loss, it has been used  safely for a number of years to help people who are carrying extra weight.     Just how topiramate helps with weight loss has not been exactly determined. However it seems to work on areas of the brain to quiet down signals related to eating.      Topiramate may make you:    >feel less interest in eating in between meals   >think less about food and eating   >find it easier to push the plate away   >find giving up pop easier    >have an easier time eating less    For some of our patients, the pills work right away. They feel and think quite differently about food. Other patients don't feel much of a change but find in fact they have lost weight! Like all weight loss medications, topiramate works best when you help it work.  This means:    1) Have less tempting high calorie (fattening) food around the house or office    2) Have lower calorie food (fruits, vegetables,low fat meats and dairy) for snacks    3) Eat out only one time or less each week.   4) Eat your meals at a table with the TV or computer off.    Side-effects. Topiramate is generally well tolerated. The main side-effects we see are:   Tingling in hands,feet, or face (usually not very troublesome)   Mental confusion and word finding trouble (about 10% of patients have this.)     Feeling sleepy or a bit dopey- this goes away very soon after starting.    One of the dangers of topiramate is the possibility of birth defects--if you get pregnant when you are on it, there is the risk that your baby will be born with a cleft lip or palate.  If you are on topiramate and of child bearing age, you need to be on a reliable form of birth control or refrain from sexual intercourse.     Please refer to the pharmacy insert for more information on side-effects. Since many pharmacists are not familiar with the use of topiramate in weight loss, calling the clinic will get you the most accurate information on the use of this medication for weight loss.     In order to get  refills of this or any medication we prescribe you must be seen in the medical weight mgmt clinic every 2-3 months. Please have your pharmacy fax a refill request to [phone].    MEDICATION STARTED AT THIS APPOINTMENT  We are starting topiramate at bedtime.  Start one tab, 25 mg, for a week. Go up to 50 mg (2 tabs) for the next week. At the third week, take   3 tabs (75 mg).  Stay at 3 tabs until you are seen again. Contact the nurse via [employer] or call [phone] if you have any questions or concerns. (Do not stop taking it if you don't think it's working. For some people it works even though they do not feel much different.)    Topiramate (Topamax) is a medication that is used most often to treat migraine headaches or for seizures. It has also been found to help with weight loss. Although it's not currently FDA approved for weight loss, it has been used safely for a number of years to help people who are carrying extra weight.     Just how topiramate helps with weight loss has not been exactly determined. However it seems to work on areas of the brain to quiet down signals related to eating.      Topiramate may make you:    >feel less interest in eating in between meals   >think less about food and eating   >find it easier to push the plate away   >find giving up pop easier    >have an easier time eating less    For some of our patients, the pills work right away. They feel and think quite differently about food. Other patients don't feel much of a change but find in fact they have lost weight! Like all weight loss medications, topiramate works best when you help it work.  This means:    1) Have less tempting high calorie (fattening) food around the house or office    2) Have lower calorie food (fruits, vegetables,low fat meats and dairy) for snacks    3) Eat out only one time or less each week.   4) Eat your meals at a table with the TV or computer off.    Side-effects. Topiramate is generally well  tolerated. The main side-effects we see are:   Tingling in hands,feet, or face (usually not very troublesome)   Mental confusion and word finding trouble (about 10% of patients have this.)     Feeling sleepy or a bit dopey- this goes away very soon after starting.    One of the dangers of topiramate is the possibility of birth defects--if you get pregnant when you are on it, there is the risk that your baby will be born with a cleft lip or palate.  If you are on topiramate and of child bearing age, you need to be on a reliable form of birth control or refrain from sexual intercourse.     Please refer to the pharmacy insert for more information on side-effects. Since many pharmacists are not familiar with the use of topiramate in weight loss, calling the clinic will get you the most accurate information on the use of this medication for weight loss.     In order to get refills of this or any medication we prescribe you must be seen in the medical weight mgmt clinic every 2-3 months.

## 2021-04-30 NOTE — LETTER
2021       RE: Babita Rivas  [address]  [facility] [address]     Dear Colleague,    Thank you for referring your patient, Babita Rivas, to the [facility] [place] at [facility]. Please see a copy of my visit note below.    Babita is a 36 year old who is being evaluated via a billable video visit.      How would you like to obtain your AVS? MyChart  If the video visit is dropped, the invitation should be resent by: Text to cell phone: [phone]  Will anyone else be joining your video visit? No      Video Start Time: 2:13 PM  Video-Visit Details    Type of service:  Video Visit    Video End Time:2:37 PM    Originating Location (pt. Location): Home    Distant Location (provider location):  [facility]     Platform used for Video Visit: Waqas       Pre-Bariatric Surgery Note    Elizabeth Pascual    Date: 2021     RE: Babita Rivas    MR#: [id number]   : 1985   Date of Visit: 2021    REASON FOR VISIT: Preoperative evaluation for possible weight loss surgery    Dear Elizabeth Gurrola,    I had the pleasure of seeing your patient, Babita Rivas, in my preoperative bariatric clinic.    As you know, she is morbidly obese and considering weight loss surgery to treat obesity in association with her medical conditions of obesity.  Her consult weight was 290.  She has lost 0 pounds since her consult weight. She has not met her required pre-surgery weight. Please refer to initial consult note from date 2021 for patient's weight history and co-morbidities.    At consult, had disorganized eating pattern. Not able to identify any hunger/ cravings. deferred AOM at that time.     Labs not done - will get done next week     Increased fruit. Working on more structured meals. Struggles with breakfast- trying banana or fruit cups. Extending meals out  for longer amount of time.   Breakfast- late morning banana or other fruit - sometimes still skips   Lunch- 12 - piece of peanut butter bread and tries to get a piece of meat - some times still skips   Snack after work 2-3pm - sometimes cooks, sometimes orders in - big meal   After that- sometimes chips, cup a noodles, pizza rolls     Craves chips/ popcorn- usually later in the day - 7-8pm       With changes in diet hasn't seen any increase in hunger/ cravings. Hasn't seen any changes in clothing fitting or weight. Has f/u appointment to get weight check coming up.     Psych scheduled 6/9/2021     Assessment & Plan   Problem List Items Addressed This Visit        Digestive    Class 3 severe obesity due to excess calories with serious comorbidity and body mass index (BMI) of 50.0 to 59.9 in adult (H)     Working on diet changes since last seen. Unsure if she has lost weight, plans to check weight next week. Has not gotten labs done, going to do this on Monday. Would like to talk about weight loss medication. Some snacking, more in the evening. Craves chips and salty foods. Will start topiramate, no hx of kidney stones or glaucoma, s/p tubal ligation.          Relevant Medications    topiramate (TOPAMAX) 25 MG tablet      Other Visit Diagnoses     Morbid obesity due to excess calories (H)    -  Primary    Relevant Medications    topiramate (TOPAMAX) 25 MG tablet           30 minutes spent on the date of the encounter doing chart review, history and exam, documentation and further activities per the note  0956}  MEDICATIONS:        - Start taking topiramate- ramp to 75mg        - Continue other medications without change  FUTURE LABS:       - Schedule a fasting blood draw  FUTURE APPOINTMENTS:       - Follow-up visit in 2 months     Reviewed tasklist with patient     Most recent weights:  Wt Readings from Last 4 Encounters:   04/30/21 131.5 kg (290 lb)   04/22/21 133.8 kg (295 lb)   02/16/21 131.5 kg (290 lb)  "        ROS    No past medical history on file.    Past Surgical History:   Procedure Laterality Date      SECTION      x 2     TUBAL LIGATION         Current Outpatient Medications   Medication     topiramate (TOPAMAX) 25 MG tablet     No current facility-administered medications for this visit.        No Known Allergies    No results found for any previous visit.       PHYSICAL EXAM:  Objective    Ht 1.575 m (5' 2\")   Wt 131.5 kg (290 lb)   BMI 53.04 kg/m    Vitals - Patient Reported  Pain Score: No Pain (0)        Physical Exam   GENERAL: Healthy, alert and no distress  EYES: Eyes grossly normal to inspection.  No discharge or erythema, or obvious scleral/conjunctival abnormalities.  RESP: No audible wheeze, cough, or visible cyanosis.  No visible retractions or increased work of breathing.    SKIN: Visible skin clear. No significant rash, abnormal pigmentation or lesions.  NEURO: Cranial nerves grossly intact.  Mentation and speech appropriate for age.  PSYCH: Mentation appears normal, affect normal/bright, judgement and insight intact, normal speech and appearance well-groomed.    Sleeve Gastrectomy: Risks and Side Effects    The complications or risks of surgery include but are not limited to: death, heart attack, infection in the surgical site (wound infection), abdomen (abscess), bladder (urinary tract infection), lungs (pneumonia), clots in legs (deep vein thrombosis) or lungs (pulmonary emboli),  injury to the bowels or other organs, bowel obstruction, hernia at the incision and gastrointestional bleeding.    More specific risks related to vertical sleeve gastrectomy were detailed at the bariatric informational seminar and include the following: leak at the vertical sleeve staple line, leak at the anastomoses,  nausea, vomiting, and dehydration for several months,  adhesions causing bowel obstruction, rapid weight loss causing a higher rate of gallstone formation during the first 6 months after " surgery, decreased absorption of vitamins and protein because of the reduced stomach size, weight regain if inappropriate food intake occurs, stricture, injury to other organs, hernia,  and ulcers.       Side effects of bariatric surgery include but are not limited to: abdominal pain, cramping, bloating, constipation, nausea, vomiting, diarrhea, difficulty swallowing,  dehydration, hair loss, excess skin, protein, iron and vitamin deficiencies, heartburn, transfer of addictions, increased anxiety and worsening depression.       I emphasized exercise and activity behavior along with appropriate food choice as the main foundation for weight loss with surgery providing surgical reinforcement of the appropriate behavior set.        Review of general surgery weight loss process    1. Complete preoperative requirements, including weight loss.  Final weight check to confirm MANDATORY weight loss requirement must be documented on a clinic scale.    2. Discuss prior authorization with .    3. History and physical evaluation by PCP of PAC clinic within 30 days of surgery date, preoperative class, and weight check (weigh-in visit) to be scheduled by patient.  Pre-anesthesia clinic for risk evaluation to be scheduled by anesthesia clinic.    4. We cannot guarantee that patient will qualify for surgery unless all preoperative requirements are met, prior authorization from primary insurance company is granted, and insurance changes do not occur.    5. It is possible for patients to regain all weight after weight loss surgery unless they follow guidelines prescribed by our bariatric center.    6. All patients with gastrointestinal complaints after weight loss surgery must have complaints conveyed to the bariatric team for appropriate treatment.    7. Vitamin deficiencies may develop post-bariatric surgery and annual laboratory testing should be performed.    8. Persistent nausea/vomiting after bariatric surgery  entails risk of thiamine deficiency and should be treated early.  Vitamin B12 deficiency may develop, especially after gastric bypass surgery and must be recognized.        If you have any questions about our plans please don't hesitate to contact me.    Sincerely,    Arlette Corbett NP

## 2021-04-30 NOTE — ASSESSMENT & PLAN NOTE
Working on diet changes since last seen. Unsure if she has lost weight, plans to check weight next week. Has not gotten labs done, going to do this on Monday. Would like to talk about weight loss medication. Some snacking, more in the evening. Craves chips and salty foods. Will start topiramate, no hx of kidney stones or glaucoma, s/p tubal ligation.

## 2021-04-30 NOTE — NURSING NOTE
"Chief Complaint   Patient presents with     Follow Up     Follow-up 1 Week Pre-Surgery Bariatric       Vitals:    04/30/21 1346   Weight: 131.5 kg (290 lb)   Height: 1.575 m (5' 2\")       Body mass index is 53.04 kg/m .                  "

## 2021-05-11 DIAGNOSIS — E66.01 MORBID OBESITY DUE TO EXCESS CALORIES (H): ICD-10-CM

## 2021-05-11 LAB
ALBUMIN SERPL-MCNC: 3.8 G/DL (ref 3.4–5)
ALP SERPL-CCNC: 69 U/L (ref 40–150)
ALT SERPL W P-5'-P-CCNC: 63 U/L (ref 0–50)
ANION GAP SERPL CALCULATED.3IONS-SCNC: 8 MMOL/L (ref 3–14)
AST SERPL W P-5'-P-CCNC: 30 U/L (ref 0–45)
BILIRUB SERPL-MCNC: 0.5 MG/DL (ref 0.2–1.3)
BUN SERPL-MCNC: 10 MG/DL (ref 7–30)
CALCIUM SERPL-MCNC: 8.7 MG/DL (ref 8.5–10.1)
CHLORIDE SERPL-SCNC: 109 MMOL/L (ref 94–109)
CHOLEST SERPL-MCNC: 186 MG/DL
CO2 SERPL-SCNC: 23 MMOL/L (ref 20–32)
CREAT SERPL-MCNC: 1 MG/DL (ref 0.52–1.04)
ERYTHROCYTE [DISTWIDTH] IN BLOOD BY AUTOMATED COUNT: 12.4 % (ref 10–15)
GFR SERPL CREATININE-BSD FRML MDRD: 72 ML/MIN/{1.73_M2}
GLUCOSE SERPL-MCNC: 126 MG/DL (ref 70–99)
HBA1C MFR BLD: 6.5 % (ref 0–5.6)
HCT VFR BLD AUTO: 43.9 % (ref 35–47)
HDLC SERPL-MCNC: 37 MG/DL
HGB BLD-MCNC: 14.1 G/DL (ref 11.7–15.7)
LDLC SERPL CALC-MCNC: 98 MG/DL
MCH RBC QN AUTO: 28.8 PG (ref 26.5–33)
MCHC RBC AUTO-ENTMCNC: 32.1 G/DL (ref 31.5–36.5)
MCV RBC AUTO: 90 FL (ref 78–100)
NONHDLC SERPL-MCNC: 149 MG/DL
PLATELET # BLD AUTO: 284 10E9/L (ref 150–450)
POTASSIUM SERPL-SCNC: 3.9 MMOL/L (ref 3.4–5.3)
PROT SERPL-MCNC: 7.5 G/DL (ref 6.8–8.8)
PTH-INTACT SERPL-MCNC: 34 PG/ML (ref 18–80)
RBC # BLD AUTO: 4.89 10E12/L (ref 3.8–5.2)
SODIUM SERPL-SCNC: 140 MMOL/L (ref 133–144)
TRIGL SERPL-MCNC: 257 MG/DL
TSH SERPL DL<=0.005 MIU/L-ACNC: 0.86 MU/L (ref 0.4–4)
WBC # BLD AUTO: 13 10E9/L (ref 4–11)

## 2021-05-11 PROCEDURE — 36415 COLL VENOUS BLD VENIPUNCTURE: CPT | Performed by: NURSE PRACTITIONER

## 2021-05-11 PROCEDURE — 84443 ASSAY THYROID STIM HORMONE: CPT | Performed by: NURSE PRACTITIONER

## 2021-05-11 PROCEDURE — 85027 COMPLETE CBC AUTOMATED: CPT | Performed by: NURSE PRACTITIONER

## 2021-05-11 PROCEDURE — 80053 COMPREHEN METABOLIC PANEL: CPT | Performed by: NURSE PRACTITIONER

## 2021-05-11 PROCEDURE — 82306 VITAMIN D 25 HYDROXY: CPT | Performed by: NURSE PRACTITIONER

## 2021-05-11 PROCEDURE — 83970 ASSAY OF PARATHORMONE: CPT | Performed by: NURSE PRACTITIONER

## 2021-05-11 PROCEDURE — 83036 HEMOGLOBIN GLYCOSYLATED A1C: CPT | Performed by: NURSE PRACTITIONER

## 2021-05-11 PROCEDURE — 80061 LIPID PANEL: CPT | Performed by: NURSE PRACTITIONER

## 2021-05-12 ENCOUNTER — TELEPHONE (OUTPATIENT)
Dept: ENDOCRINOLOGY | Facility: CLINIC | Age: 36
End: 2021-05-12

## 2021-05-12 DIAGNOSIS — E11.9 TYPE 2 DIABETES MELLITUS WITHOUT COMPLICATION, WITHOUT LONG-TERM CURRENT USE OF INSULIN (H): Primary | ICD-10-CM

## 2021-05-12 LAB — DEPRECATED CALCIDIOL+CALCIFEROL SERPL-MC: 9 UG/L (ref 20–75)

## 2021-05-12 RX ORDER — METFORMIN HCL 500 MG
TABLET, EXTENDED RELEASE 24 HR ORAL
Qty: 60 TABLET | Refills: 3 | Status: SHIPPED | OUTPATIENT
Start: 2021-05-12 | End: 2022-01-25

## 2021-05-12 NOTE — TELEPHONE ENCOUNTER
Pt wanted to make sure that she has everything done that she needs to have done before surgery. Pt wants to know when her surgery is going to be as well.    [phone] - Confirmed

## 2021-05-12 NOTE — PATIENT INSTRUCTIONS
MEDICATION STARTED AT THIS APPOINTMENT    We are starting metformin.  Starting instructions:      Immediate release tablet: Take 1 tablet by mouth daily with a meal for 1 week, then increase to 1 tablet twice daily with meals.     Extended release tablet: Take 1 tablet by mouth daily with a meal for 1 week, then increase to 2 tablets daily with a meal or 1 tablet twice daily with meals.      Contact the nurse via [employer] or call [phone] if you have any questions or concerns    Metformin is a medication that is used to assist with lowering blood sugars in patients that have Pre-Diabetes or Diabetes. It has also been found to help with weight loss.    Metformin helps to lower blood sugars by lowering the amount of sugar (glucose) your liver produces that would otherwise cause your blood sugar to increase. It also makes your body respond better to insulin (the product that lowers your blood sugar). It is unclear how metformin assists with weight loss.     Side-effects. Metformin is generally well tolerated. The main side-effects we see are diarrhea, nausea and stomach upset - this tends to subside over time as your body gets used to the medication. Taking this medications with food will lower these side effects.    Low blood sugar (hypoglycemia) is rare to occur with metformin, but can occur if you do not eat enough or are taking other medications that assist to lower blood sugar. The signs of low blood sugar are:  o Weakness  o Shaky   o Hungry  o Sweating  o Confusion    See below for ways to treat low blood sugar without adding in lots of extra calories.    Treating Low Blood Sugar    If you have symptoms of low blood sugar (sweating, shaking, dizzy, confused) eat 15 grams of carbs and wait 15 minutes:      Glucose Tabs are best for sugars under 70 -  Dex4 or BD Glucose tablets are good, you will need to take 3-4 of these to equal 15 grams.       One small box of raisins    4 oz fruit juice box or   cup fruit  juice    1 small apple    1 small banana      cup canned fruit in water      English muffin or a slice of bread with jelly     1 low fat frozen waffle with sugar-free syrup      cup cottage cheese with   cup frozen or fresh blueberries    1 cup skim or low-fat milk      cup whole grain cereal    4-6 crackers such as Triscuits    Please refer to the pharmacy insert for more information on side-effects. Please call the clinic should you have more questions regarding this medication.      In order to get refills of this or any medication we prescribe you must be seen in the medical weight mgmt clinic every 2-3 months.

## 2021-05-13 DIAGNOSIS — E66.813 CLASS 3 SEVERE OBESITY DUE TO EXCESS CALORIES WITH SERIOUS COMORBIDITY AND BODY MASS INDEX (BMI) OF 50.0 TO 59.9 IN ADULT (H): Primary | ICD-10-CM

## 2021-05-13 DIAGNOSIS — E66.01 CLASS 3 SEVERE OBESITY DUE TO EXCESS CALORIES WITH SERIOUS COMORBIDITY AND BODY MASS INDEX (BMI) OF 50.0 TO 59.9 IN ADULT (H): Primary | ICD-10-CM

## 2021-05-13 DIAGNOSIS — E55.9 VITAMIN D DEFICIENCY: ICD-10-CM

## 2021-07-02 ENCOUNTER — OFFICE VISIT (OUTPATIENT)
Dept: FAMILY MEDICINE | Facility: CLINIC | Age: 36
End: 2021-07-02
Payer: COMMERCIAL

## 2021-07-02 VITALS
HEIGHT: 63 IN | BODY MASS INDEX: 51.91 KG/M2 | DIASTOLIC BLOOD PRESSURE: 87 MMHG | OXYGEN SATURATION: 99 % | SYSTOLIC BLOOD PRESSURE: 159 MMHG | HEART RATE: 81 BPM | WEIGHT: 293 LBS | TEMPERATURE: 97.9 F

## 2021-07-02 DIAGNOSIS — Z53.9 ERRONEOUS ENCOUNTER--DISREGARD: ICD-10-CM

## 2021-07-02 DIAGNOSIS — Z11.4 SCREENING FOR HIV (HUMAN IMMUNODEFICIENCY VIRUS): ICD-10-CM

## 2021-07-02 DIAGNOSIS — Z11.59 NEED FOR HEPATITIS C SCREENING TEST: ICD-10-CM

## 2021-07-02 DIAGNOSIS — Z01.818 PREOP GENERAL PHYSICAL EXAM: Primary | ICD-10-CM

## 2021-07-02 ASSESSMENT — MIFFLIN-ST. JEOR: SCORE: 1997.24

## 2021-07-02 ASSESSMENT — PAIN SCALES - GENERAL: PAINLEVEL: NO PAIN (0)

## 2021-07-16 ENCOUNTER — TELEPHONE (OUTPATIENT)
Dept: BEHAVIORAL HEALTH | Facility: CLINIC | Age: 36
End: 2021-07-16

## 2021-07-16 NOTE — TELEPHONE ENCOUNTER
7/16/21 Received call from Pt requesting a DA for Adult MH. Scheduled for 7/23/21 Video Visit. ALEX

## 2021-07-23 ENCOUNTER — HOSPITAL ENCOUNTER (OUTPATIENT)
Dept: BEHAVIORAL HEALTH | Facility: CLINIC | Age: 36
End: 2021-07-23
Attending: FAMILY MEDICINE
Payer: COMMERCIAL

## 2021-07-23 PROCEDURE — 999N000216 HC STATISTIC ADULT CD FACE TO FACE-NO CHRG: Mod: GT | Performed by: COUNSELOR

## 2021-07-23 ASSESSMENT — COLUMBIA-SUICIDE SEVERITY RATING SCALE - C-SSRS
1. IN THE PAST MONTH, HAVE YOU WISHED YOU WERE DEAD OR WISHED YOU COULD GO TO SLEEP AND NOT WAKE UP?: NO
ATTEMPT LIFETIME: NO
3. HAVE YOU BEEN THINKING ABOUT HOW YOU MIGHT KILL YOURSELF?: NO
ATTEMPT PAST THREE MONTHS: NO
TOTAL  NUMBER OF ABORTED OR SELF INTERRUPTED ATTEMPTS PAST LIFETIME: NO
TOTAL  NUMBER OF INTERRUPTED ATTEMPTS LIFETIME: NO
6. HAVE YOU EVER DONE ANYTHING, STARTED TO DO ANYTHING, OR PREPARED TO DO ANYTHING TO END YOUR LIFE?: NO
6. HAVE YOU EVER DONE ANYTHING, STARTED TO DO ANYTHING, OR PREPARED TO DO ANYTHING TO END YOUR LIFE?: NO
2. HAVE YOU ACTUALLY HAD ANY THOUGHTS OF KILLING YOURSELF LIFETIME?: NO
5. HAVE YOU STARTED TO WORK OUT OR WORKED OUT THE DETAILS OF HOW TO KILL YOURSELF? DO YOU INTEND TO CARRY OUT THIS PLAN?: NO
2. HAVE YOU ACTUALLY HAD ANY THOUGHTS OF KILLING YOURSELF?: NO
TOTAL  NUMBER OF ABORTED OR SELF INTERRUPTED ATTEMPTS PAST 3 MONTHS: NO
5. HAVE YOU STARTED TO WORK OUT OR WORKED OUT THE DETAILS OF HOW TO KILL YOURSELF? DO YOU INTEND TO CARRY OUT THIS PLAN?: NO
4. HAVE YOU HAD THESE THOUGHTS AND HAD SOME INTENTION OF ACTING ON THEM?: NO
1. IN THE PAST MONTH, HAVE YOU WISHED YOU WERE DEAD OR WISHED YOU COULD GO TO SLEEP AND NOT WAKE UP?: NO
TOTAL  NUMBER OF INTERRUPTED ATTEMPTS PAST 3 MONTHS: NO
4. HAVE YOU HAD THESE THOUGHTS AND HAD SOME INTENTION OF ACTING ON THEM?: NO

## 2021-07-23 ASSESSMENT — ANXIETY QUESTIONNAIRES
GAD7 TOTAL SCORE: 0
8. IF YOU CHECKED OFF ANY PROBLEMS, HOW DIFFICULT HAVE THESE MADE IT FOR YOU TO DO YOUR WORK, TAKE CARE OF THINGS AT HOME, OR GET ALONG WITH OTHER PEOPLE?: NOT DIFFICULT AT ALL
5. BEING SO RESTLESS THAT IT IS HARD TO SIT STILL: NOT AT ALL
GAD7 TOTAL SCORE: 0
7. FEELING AFRAID AS IF SOMETHING AWFUL MIGHT HAPPEN: NOT AT ALL
4. TROUBLE RELAXING: NOT AT ALL
3. WORRYING TOO MUCH ABOUT DIFFERENT THINGS: NOT AT ALL
6. BECOMING EASILY ANNOYED OR IRRITABLE: NOT AT ALL
2. NOT BEING ABLE TO STOP OR CONTROL WORRYING: NOT AT ALL
GAD7 TOTAL SCORE: 0
1. FEELING NERVOUS, ANXIOUS, OR ON EDGE: NOT AT ALL
7. FEELING AFRAID AS IF SOMETHING AWFUL MIGHT HAPPEN: NOT AT ALL

## 2021-07-23 ASSESSMENT — PATIENT HEALTH QUESTIONNAIRE - PHQ9
SUM OF ALL RESPONSES TO PHQ QUESTIONS 1-9: 0
10. IF YOU CHECKED OFF ANY PROBLEMS, HOW DIFFICULT HAVE THESE PROBLEMS MADE IT FOR YOU TO DO YOUR WORK, TAKE CARE OF THINGS AT HOME, OR GET ALONG WITH OTHER PEOPLE: NOT DIFFICULT AT ALL
SUM OF ALL RESPONSES TO PHQ QUESTIONS 1-9: 0

## 2021-07-23 NOTE — PROGRESS NOTES
"[facility] Mental [facility] Addiction [facility]    ADULT Mental Health Assessment Appointment Note    PATIENT'S NAME:  Babita Rivas    Preferred Pronoun: She / Her  MRN: [id number]  YOB: 1985  Address:  [address]  [facility] [address]  PREFERRED PHONE: [phone]  May we leave a referral related message: Yes    DATE OF SERVICE: 21  START TIME: 1055  END TIME: 1115  SERVICE MODALITY:  Phone Visit:      Provider verified identity through the following two step process.  Patient provided:  Patient  and Patient address    The patient has been notified of the following:      \"We have found that certain health care needs can be provided without the need for a face to face visit.  This service lets us provide the care you need with a phone conversation.       I will have full access to your [facility] medical record during this entire phone call.   I will be taking notes for your medical record.      Since this is like an office visit, we will bill your insurance company for this service.       There are potential benefits and risks of telephone visits (e.g. limits to patient confidentiality) that differ from in-person visits.?Confidentiality still applies for telephone services, and nobody will record the visit.  It is important to be in a quiet, private space that is free of distractions (including cell phone or other devices) during the visit.??      If during the course of the call I believe a telephone visit is not appropriate, you will not be charged for this service\"     Consent has been obtained for this service by care team member: Yes     Identifying Information:  Patient is a 36 year old, .  Patient was referred for an assessment by her bariatric team.  Patient attended the session alone. Patient identified their preferred language to be English. Patient reported they does not need the assistance of an  or other support " "involved in therapy.     Services Requested:   The reason for seeking services at this time is: \" Evaluation for bariatric surgery. \"    Patient does not have legal concerns.    Mental Health:  Review of Symptoms per patient report:  Depression: No symptoms  Melania: No Symptoms  Psychosis: No Symptoms  Anxiety: No Symptoms  Panic: No symptoms  Post Traumatic Stress Disorder: No Symptoms  Eating Disorder: No Symptoms  ADD / ADHD: No symptoms  Conduct Disorder: No symptoms  Autism Spectrum Disorder: No symptoms  Obsessive Compulsive Disorder: No Symptoms    Substance Use:  Do you have a history of alcohol or illicit drug use? Patient denies    Significant Losses / Trauma / Abuse / Neglect Issues:   There are indications or report of significant loss, trauma, abuse or neglect issues related to: are no indications and client denies any losses, trauma, abuse, or neglect concerns.  Concerns for possible neglect are not present.     Medical History:  Patient reports the following current medical concerns: in the process of bariatric surgery. Patient denies any issues with pain.  There are not significant appetite / nutritional concerns / weight changes.       Current Mental Status Exam:   Appearance:  Appropriate    Eye Contact:  Good   Psychomotor:  Normal   Attitude / Demeanor: Cooperative   Speech Rate:  Normal/ Responsive  Volume:  Normal  volume  Language:  intact  Mood:   Normal  Affect:   Appropriate    Thought Content: Clear   Thought Process: Goal Directed     Associations:  No loosening of associations  Insight:   Good   Judgment:  Intact   Orientation:  Person  Attention:  Good    Rating Scales:  PHQ9:    PHQ-9 SCORE 7/23/2021   PHQ-9 Total Score MyChart 0   PHQ-9 Total Score 0   ;    GAD7:    ABRAN-7 SCORE 7/23/2021   Total Score 0 (minimal anxiety)   Total Score 0     Safety Assessment:   Current Safety Concerns:  [place] Suicide Severity Rating Scale (Lifetime/Recent)  [place] Suicide Severity Rating " (Lifetime/Recent) 7/23/2021   1. Wish to be Dead (Lifetime) No   1. Wish to be Dead (Recent) No   2. Non-Specific Active Suicidal Thoughts (Lifetime) No   2. Non-Specific Active Suicidal Thoughts (Recent) No   3. Active Suicidal Ideation with any Methods (Not Plan) Without Intent to Act (Lifetime) No   3. Active Suicidal Ideation with any Methods (Not Plan) Without Intent to Act (Recent) No   4. Active Suicidal Ideation with Some Intent to Act, Without Specific Plan (Lifetime) No   4. Active Suicidal Ideation with Some Intent to Act, Without Specific Plan (Recent) No   5. Active Suicidal Ideation with Specific Plan and Intent (Lifetime) No   5. Active Suicidal Ideation with Specific Plan and Intent (Recent) No   Most Severe Ideation Rating (Lifetime) NA   Frequency (Lifetime) NA   Controllability (Lifetime) NA   Protective Factors  (Lifetime) NA   Reasons for Ideation (Lifetime) NA   Most Severe Ideation Rating (Past Month) NA   Most Severe Ideation Description (Past Month) n   Frequency (Past Month) NA   Duration (Past Month) NA   Controllability (Past Month) NA   Protective Factors (Past Month) NA   Reasons for Ideation (Past Month) NA   Actual Attempt (Lifetime) No   Actual Attempt (Past 3 Months) No   Has subject engaged in non-suicidal self-injurious behavior? (Lifetime) No   Has subject engaged in non-suicidal self-injurious behavior? (Past 3 Months) No   Interrupted Attempts (Lifetime) No   Interrupted Attempts (Past 3 Months) No   Aborted or Self-Interrupted Attempt (Lifetime) No   Aborted or Self-Interrupted Attempt (Past 3 Months) No   Preparatory Acts or Behavior (Lifetime) No   Preparatory Acts or Behavior (Past 3 Months) No     Patient denies current homicidal ideation and behaviors.  Patient denies current self-injurious ideation and behaviors.    Patient denied risk behaviors associated with substance use.  Patient denies any high risk behaviors associated with mental health symptoms.  Patient reports  the following current concerns for their personal safety: None.    Clinical Impressions:  Not applicable    Therapeutic Interventions Provided: supportive active listening, explored alternatives, emotional validation and identified problem solving techniques     Recommendations/Plan: Patient is in the middle of the bariatric surgery and was instructed to have psych testing prior to having surgery. She is scheduled with a [facility] psychologist, but they are booked out into November and team has asked her to find a provider with sooner availability. Patient is denying any history of MH issues and denies any issues at present, does not feel she needs to be followed by a therapist or psychiatrist at this time. Does not require PHP or IOP services.  Patient encouraged to reach out to bariatric team to determine if being followed by a therapist is a requirement. Patient verbalized an understanding.    Referrals: Dr.Brian Bashir LP at [facility] in [place] does bariatric assessments. Information provided to patient.     Michelle Salguero LP,  July 23, 2021  [facility]

## 2021-07-24 ASSESSMENT — ANXIETY QUESTIONNAIRES: GAD7 TOTAL SCORE: 0

## 2021-07-24 ASSESSMENT — PATIENT HEALTH QUESTIONNAIRE - PHQ9: SUM OF ALL RESPONSES TO PHQ QUESTIONS 1-9: 0

## 2021-08-03 ENCOUNTER — VIRTUAL VISIT (OUTPATIENT)
Dept: ENDOCRINOLOGY | Facility: CLINIC | Age: 36
End: 2021-08-03
Payer: COMMERCIAL

## 2021-08-03 DIAGNOSIS — Z71.3 NUTRITIONAL COUNSELING: Primary | ICD-10-CM

## 2021-08-03 DIAGNOSIS — E66.01 CLASS 3 SEVERE OBESITY DUE TO EXCESS CALORIES WITH SERIOUS COMORBIDITY AND BODY MASS INDEX (BMI) OF 50.0 TO 59.9 IN ADULT (H): ICD-10-CM

## 2021-08-03 DIAGNOSIS — E66.813 CLASS 3 SEVERE OBESITY DUE TO EXCESS CALORIES WITH SERIOUS COMORBIDITY AND BODY MASS INDEX (BMI) OF 50.0 TO 59.9 IN ADULT (H): ICD-10-CM

## 2021-08-03 PROCEDURE — 97803 MED NUTRITION INDIV SUBSEQ: CPT | Mod: 95 | Performed by: DIETITIAN, REGISTERED

## 2021-08-03 NOTE — PROGRESS NOTES
"Babita Rivas is a 35 year old who is being evaluated via a billable video visit.      The patient has been notified of following:     \"This video visit will be conducted via a call between you and your physician/provider. We have found that certain health care needs can be provided without the need for an in-person physical exam.  This service lets us provide the care you need with a video conversation.  If a prescription is necessary we can send it directly to your pharmacy.  If lab work is needed we can place an order for that and you can then stop by our lab to have the test done at a later time.    Video visits are billed at different rates depending on your insurance coverage.  Please reach out to your insurance provider with any questions.    If during the course of the call the physician/provider feels a video visit is not appropriate, you will not be charged for this service.\"    Patient has given verbal consent for Video visit? Yes  How would you like to obtain your AVS? MyChart  If you are dropped from the video visit, the video invite should be resent to: Text to cell phone: [phone]  Will anyone else be joining your video visit? No     Video-Visit Details    Type of service:  Video Visit    Video Start Time: 2:03 PM  Video End Time: 2:34 PM  *Switched to phone call, video was not working properly. Also tried Doximity, audio issues. Switched to phone call, RD just blocked phone number for privacy    Originating Location (pt. Location): Home    Distant Location (provider location):  [facility] [place]     Platform used for Video Visit: Quintic    During this virtual visit the patient is located in [place], patient verifies this as the location during the entirety of this visit.     Return Bariatric Nutrition Consultation Note    Reason For Visit: Nutrition Assessment    Babita Rivas is a 35 year old presenting today for follow-up bariatric nutrition consult.  " "Pt is interested in laparoscopic sleeve gastrectomy with Dr. Fitzpatrick expected surgery TBD.  Patient is accompanied by self.  This is pt's 4th of 3rd required nutrition visits prior to surgery.     Pt referred by Arlette Corbett NP on February 15, 2021.    Patient with Co-morbidities of obesity including:  Type II DM no  Renal Failure no  Sleep apnea no  Hypertension no   Dyslipidemia no  Joint pain no  Back pain no  GERD no     Support System Reviewed With Patient 2/9/2021   Who do you have in your support network that can be available to help you for the first 2 weeks after surgery? My mother my kids my kids father   Who can you count on for support throughout your weight loss surgery journey? My family       ANTHROPOMETRICS:  Estimated body mass index is 52.26 kg/m  as calculated from the following:    Height as of 7/2/21: 1.6 m (5' 3\").    Weight as of 7/2/21: 133.8 kg (295 lb).     Current weight: 289 or 293 lbs at last doctor appt per pt.  Pt thought they said 289 lbs but doctor note says 293 lbs     Required weight loss goal pre-op: 10 lbs from initial consult weight (goal weight 280 lbs or less before surgery)       2/9/2021   I have tried the following methods to lose weight Watching portions or calories, Exercise, Slimfast, Prescription Medications       Weight Loss Questions Reviewed With Patient 2/9/2021   How long have you been overweight? From Middle age and beyond     SUPPLEMENT INFORMATION:  None at this time    Medications:   Metformin and topiramate  - Just started taking topiramate 2 weeks ago.   - Stomach pain if eating metformin on empty stomach, makes her want to eat more.    NUTRITION HISTORY:  Pt concern with all the appointments prior to surgery, again discussed appointments and reviewed task list with patient.     Does not have consistent meal patterns, typically only consumes 2 meals daily.      Diet Recall:  B: Milk  Snack: Banana/ fruit   L: Chicken tenders and salad   D: Crab legs with white " rice.     August 3rd, 2021:    Working on completing task list, did not realize she might need more than one psych appt. Has three scheduled for Oct/Nov but is hoping to get them done earlier. She is waiting to hear back from a provider that she would only need 1 appt with for psych  Testing.     Pt has not seen RD since April. Was seeing Becca regularly before she moved.     Lots of stress/grief - her partner's daughter was murdered recently, Kelley Anderson, in [place].    Pt making food for kids while on appt. Making tacos. Plans to eat some as well.  Daughter 17, son 14 and another kid that is 4. Also has kids through her partner's kids. Lots of support around for after surgery, per pt.     Struggling with weight loss - not sure if meds are helping.  Really hard to lose weight since having last child in particular she has noticed, feels like her muscles are all gone.    Busy at work - hard to fit in breakfast.   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 Metabolism decreased with less food.   Discussed getting protein bars or shakes to help with intake at breakfast time and already-made salads from [employer] for dinner    Recall Diet Questions Reviewed With Patient 2/9/2021   Describe what you typically consume for breakfast (typical or most recent): Nothing or palmer egg sausage   Describe what you typically consume for lunch (typical or most recent): Order out if I m at work   Describe what you typically consume for supper (typical or most recent): Chicken   Describe what you typically consume as snacks (typical or most recent): Chips   How many ounces of water, or other low calorie drinks, do you drink daily  (8 oz=1 glass)? 24 oz   How many ounces of caffeine (coffee, tea, pop) do you drink daily (8 oz=1 glass)? 24 oz   How many ounces of carbonated (pop, beer, sparkling water) drinks do you drinky daily (8 oz=1 glass)? 32 oz - pop    How many ounces of juice, pop, sweet tea, sports drinks, protein drinks, other sweetened drinks, do you drink daily (8 oz=1 glass)? 32 oz   How many ounces of milk do you drink daily (8 oz=1 glass) 24 oz   Please indicate the type of milk: 2%   How often do you drink alcohol? 2-4 times a month   If you do drink alcohol, how many drinks might you have in a day? (one drink = 5 oz. wine, 1 can/bottle of beer, 1 shot liquor) 3 or 4       Eating Habits 2/9/2021   Do you have any dietary restrictions? No   Do you currently binge eat (eat a large amount of food in a short time)? No   Are you an emotional eater? No   Do you get up to eat after falling asleep? No   What foods do you crave? Crab legs shrimp       Dining Out History Reviewed With Patient 2/9/2021   How often do you dine out? Rarely.   Where do you dine out? (select all that apply) take out   What types of food do you order when you dine out? Sea food samma teena       Physical Activity Reviewed With Patient 2/9/2021   How often do you exercise? 1 to 2 times per week   What is the duration of your exercise (in minutes)? 15 Minutes   What types of exercise do you do? walking   What keeps you from being more active?  Pain       NUTRITION DIAGNOSIS:  Obesity r/t long history of self-monitoring deficit and excessive energy intake aeb BMI >30 kg/m2.    INTERVENTION:  Reviewed current dietary habits   Reviewed and modified previous goals - did not discuss previous goals in detail as it has 3-4 months since last RD appt. Pt has been working on the goals related to surgery but struggling with weight loss. Session focused on eating habits today.  Answered pt questions  Coordination of care - reviewed task list. Psych appt in process of being  "scheduled, has not done yet  Nutrition education   AVS and handouts via [employer]    Questions Reviewed With Patient 2/9/2021   How ready are you to make changes regarding your weight? Number 1 = Not ready at all to make changes up to 10 = very ready. 10   How confident are you that you can change? 1 = Not confident that you will be successful making changes up to 10 = very confident. 10       Expected Engagement: fair    GOALS:  1. Try to eat something 3x/day.  Breakfast - get protein shakes/bars  Lunch - loves salads, discussed already made ones from [employer]    From last appt:  Relating To Eating:  Eat slowly (20-30 minutes per meal), chewing foods well (25 chews per bite/applesauce consistency)  9\" Plate method (1/2 plate non-starchy vegetables/fruit, 1/4 plate lean protein, 1/4 plate whole grain starch - no more than 1/2 cup carb/meal)  Eat 3 meals per day  - Try a protein shake for breakfast (See protein shake options below)   Avoid snacking    Relating to beverages:  Reduce caffeine/carbonation/calorie containing beverages     *Protein Shake Criteria: no more than 210 Calories, at least 20 grams of protein, and less than 10 grams of sugar     Meal Replacement Options:   [facility] smoothie (160 Calories, 20 g protein)   Premier Protein (160 Calories, 30 g protein)  Slim Fast Advanced Nutrition (180 Calories, 20 g protein)  Muscle Milk, lactose-free, 17 oz bottle (210 Calories, 30 g protein)  Integrated Supplements, no artificial sugars (110 Calories, 20 g protein)  Quest Protein Bars (190 Calories, 20 g protein)  No Cow Protein Bar, gluten, dairy, and soy free (200 Calories, 20 g protein)    My Plate Planner_English - Pt Education  [email or web address]    Protein Sources for Weight Loss  [email or web address]     Carbohydrates  [email or web address]       Time spent with patient: 31 minutes.  BUNNY Rivera, RD, LD  "

## 2021-08-03 NOTE — PATIENT INSTRUCTIONS
"GOALS  1. Try to eat something 3x/day.  Breakfast - get protein shakes/bars  Lunch - salads, already made ones from [employer]. Make sure it has protein or can add some if needed (chicken, eggs, beans, cheese, sunflower seeds as examples)    From last appt:  Relating To Eating:  Eat slowly (20-30 minutes per meal), chewing foods well (25 chews per bite/applesauce consistency)  9\" Plate method (1/2 plate non-starchy vegetables/fruit, 1/4 plate lean protein, 1/4 plate whole grain starch - no more than 1/2 cup carb/meal)  Eat 3 meals per day  - Try a protein shake for breakfast (See protein shake options below)     Relating to beverages:  Reduce caffeine/carbonation/calorie containing beverages     *Protein Shake Criteria: no more than 210 Calories, at least 20 grams of protein, and less than 10 grams of sugar     Meal Replacement Options:   [facility] smoothie (160 Calories, 20 g protein)   Premier Protein (160 Calories, 30 g protein)  Slim Fast Advanced Nutrition (180 Calories, 20 g protein)  Muscle Milk, lactose-free, 17 oz bottle (210 Calories, 30 g protein)  Integrated Supplements, no artificial sugars (110 Calories, 20 g protein)  Quest Protein Bars (190 Calories, 20 g protein)  No Cow Protein Bar, gluten, dairy, and soy free (200 Calories, 20 g protein)    My Plate Planner_English - Pt Education  [email or web address]    Protein Sources for Weight Loss  [email or web address]     Carbohydrates  [email or web address]       "

## 2021-08-03 NOTE — LETTER
"8/3/2021       RE: Babita Rivas  [address]  [place] [address]     Dear Colleague,    Thank you for referring your patient, Babita Rivas, to the [facility] [place] at [facility]. Please see a copy of my visit note below.    Babita Rivas is a 35 year old who is being evaluated via a billable video visit.      The patient has been notified of following:     \"This video visit will be conducted via a call between you and your physician/provider. We have found that certain health care needs can be provided without the need for an in-person physical exam.  This service lets us provide the care you need with a video conversation.  If a prescription is necessary we can send it directly to your pharmacy.  If lab work is needed we can place an order for that and you can then stop by our lab to have the test done at a later time.    Video visits are billed at different rates depending on your insurance coverage.  Please reach out to your insurance provider with any questions.    If during the course of the call the physician/provider feels a video visit is not appropriate, you will not be charged for this service.\"    Patient has given verbal consent for Video visit? Yes  How would you like to obtain your AVS? MyChart  If you are dropped from the video visit, the video invite should be resent to: Text to cell phone: [phone]  Will anyone else be joining your video visit? No     Video-Visit Details    Type of service:  Video Visit    Video Start Time: 2:03 PM  Video End Time: 2:34 PM  *Switched to phone call, video was not working properly. Also tried Doximity, audio issues. Switched to phone call, RD just blocked phone number for privacy    Originating Location (pt. Location): Home    Distant Location (provider location):  [facility]     Platform used for Video Visit: " "Waqas    During this virtual visit the patient is located in [place], patient verifies this as the location during the entirety of this visit.     Return Bariatric Nutrition Consultation Note    Reason For Visit: Nutrition Assessment    Babita Rivas is a 35 year old presenting today for follow-up bariatric nutrition consult.  Pt is interested in laparoscopic sleeve gastrectomy with Dr. Fitzpatrick expected surgery TBD.  Patient is accompanied by self.  This is pt's 4th of 3rd required nutrition visits prior to surgery.     Pt referred by Arlette Corbett NP on February 15, 2021.    Patient with Co-morbidities of obesity including:  Type II DM no  Renal Failure no  Sleep apnea no  Hypertension no   Dyslipidemia no  Joint pain no  Back pain no  GERD no     Support System Reviewed With Patient 2/9/2021   Who do you have in your support network that can be available to help you for the first 2 weeks after surgery? My mother my kids my kids father   Who can you count on for support throughout your weight loss surgery journey? My family       ANTHROPOMETRICS:  Estimated body mass index is 52.26 kg/m  as calculated from the following:    Height as of 7/2/21: 1.6 m (5' 3\").    Weight as of 7/2/21: 133.8 kg (295 lb).     Current weight: 289 or 293 lbs at last doctor appt per pt.  Pt thought they said 289 lbs but doctor note says 293 lbs     Required weight loss goal pre-op: 10 lbs from initial consult weight (goal weight 280 lbs or less before surgery)       2/9/2021   I have tried the following methods to lose weight Watching portions or calories, Exercise, Slimfast, Prescription Medications       Weight Loss Questions Reviewed With Patient 2/9/2021   How long have you been overweight? From Middle age and beyond     SUPPLEMENT INFORMATION:  None at this time    Medications:   Metformin and topiramate  - Just started taking topiramate 2 weeks ago.   - Stomach pain if eating metformin on empty stomach, makes her want to eat " more.    NUTRITION HISTORY:  Pt concern with all the appointments prior to surgery, again discussed appointments and reviewed task list with patient.     Does not have consistent meal patterns, typically only consumes 2 meals daily.      Diet Recall:  B: Milk  Snack: Banana/ fruit   L: Chicken tenders and salad   D: Crab legs with white rice.     August 3rd, 2021:    Working on completing task list, did not realize she might need more than one psych appt. Has three scheduled for Oct/Nov but is hoping to get them done earlier. She is waiting to hear back from a provider that she would only need 1 appt with for psych  Testing.     Pt has not seen RD since April. Was seeing Becca regularly before she moved.     Lots of stress/grief - her partner's daughter was murdered recently, Kelley Anderson, in [place].    Pt making food for kids while on appt. Making tacos. Plans to eat some as well.  Daughter 17, son 14 and another kid that is 4. Also has kids through her partner's kids. Lots of support around for after surgery, per pt.     Struggling with weight loss - not sure if meds are helping.  Really hard to lose weight since having last child in particular she has noticed, feels like her muscles are all gone.    Busy at work - hard to fit in breakfast.   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 Metabolism decreased with less food.   Discussed getting protein bars or shakes to help with intake at breakfast time and already-made salads from [employer] for dinner    Recall Diet Questions Reviewed With Patient 2/9/2021   Describe what you typically consume for breakfast (typical or most recent): Nothing or palmer egg  sausage   Describe what you typically consume for lunch (typical or most recent): Order out if I m at work   Describe what you typically consume for supper (typical or most recent): Chicken   Describe what you typically consume as snacks (typical or most recent): Chips   How many ounces of water, or other low calorie drinks, do you drink daily (8 oz=1 glass)? 24 oz   How many ounces of caffeine (coffee, tea, pop) do you drink daily (8 oz=1 glass)? 24 oz   How many ounces of carbonated (pop, beer, sparkling water) drinks do you drinky daily (8 oz=1 glass)? 32 oz - pop    How many ounces of juice, pop, sweet tea, sports drinks, protein drinks, other sweetened drinks, do you drink daily (8 oz=1 glass)? 32 oz   How many ounces of milk do you drink daily (8 oz=1 glass) 24 oz   Please indicate the type of milk: 2%   How often do you drink alcohol? 2-4 times a month   If you do drink alcohol, how many drinks might you have in a day? (one drink = 5 oz. wine, 1 can/bottle of beer, 1 shot liquor) 3 or 4       Eating Habits 2/9/2021   Do you have any dietary restrictions? No   Do you currently binge eat (eat a large amount of food in a short time)? No   Are you an emotional eater? No   Do you get up to eat after falling asleep? No   What foods do you crave? Crab legs shrimp       Dining Out History Reviewed With Patient 2/9/2021   How often do you dine out? Rarely.   Where do you dine out? (select all that apply) take out   What types of food do you order when you dine out? Sea food pasta teena       Physical Activity Reviewed With Patient 2/9/2021   How often do you exercise? 1 to 2 times per week   What is the duration of your exercise (in minutes)? 15 Minutes   What types of exercise do you do? walking   What keeps you from being more active?  Pain       NUTRITION DIAGNOSIS:  Obesity r/t long history of self-monitoring deficit and excessive energy intake aeb BMI >30 kg/m2.    INTERVENTION:  Reviewed current dietary habits  "  Reviewed and modified previous goals - did not discuss previous goals in detail as it has 3-4 months since last RD appt. Pt has been working on the goals related to surgery but struggling with weight loss. Session focused on eating habits today.  Answered pt questions  Coordination of care - reviewed task list. Psych appt in process of being scheduled, has not done yet  Nutrition education   AVS and handouts via [employer]    Questions Reviewed With Patient 2/9/2021   How ready are you to make changes regarding your weight? Number 1 = Not ready at all to make changes up to 10 = very ready. 10   How confident are you that you can change? 1 = Not confident that you will be successful making changes up to 10 = very confident. 10       Expected Engagement: fair    GOALS:  1. Try to eat something 3x/day.  Breakfast - get protein shakes/bars  Lunch - loves salads, discussed already made ones from [employer]    From last appt:  Relating To Eating:  Eat slowly (20-30 minutes per meal), chewing foods well (25 chews per bite/applesauce consistency)  9\" Plate method (1/2 plate non-starchy vegetables/fruit, 1/4 plate lean protein, 1/4 plate whole grain starch - no more than 1/2 cup carb/meal)  Eat 3 meals per day  - Try a protein shake for breakfast (See protein shake options below)   Avoid snacking    Relating to beverages:  Reduce caffeine/carbonation/calorie containing beverages     *Protein Shake Criteria: no more than 210 Calories, at least 20 grams of protein, and less than 10 grams of sugar     Meal Replacement Options:   [facility] smoothie (160 Calories, 20 g protein)   Premier Protein (160 Calories, 30 g protein)  Slim Fast Advanced Nutrition (180 Calories, 20 g protein)  Muscle Milk, lactose-free, 17 oz bottle (210 Calories, 30 g protein)  Integrated Supplements, no artificial sugars (110 Calories, 20 g protein)  Quest Protein Bars (190 Calories, 20 g protein)  No Cow Protein Bar, gluten, dairy, and soy free (200 " Calories, 20 g protein)    My Plate Planner_English - Pt Education  [email or web address]    Protein Sources for Weight Loss  [email or web address]     Carbohydrates  [email or web address]       Time spent with patient: 31 minutes.  BUNNY Rivera, RD, LD

## 2021-08-03 NOTE — Clinical Note
"Pt wanted me to let you know she is \"unsure about meds and still struggling with weight loss\". We addressed diet - sounds like she actually may not be eating enough. Discussed how this can affect metabolism/weight. Plan to try adding protein shake/bar for breakfast.     She said she just started taking the topiramate 2 weeks ago (unclear why - said the pharmacy told her there were 2 meds for her, she had previously just been taking metformin). Is taking metformin but gets stick from it, shania on an empty stomach so it \"makes her want to eat more\". Sounds like she is not getting enough food but clear if she is snacking later to make up for it or not. I will try to assess further next month. Thanks!"

## 2021-08-15 ENCOUNTER — HEALTH MAINTENANCE LETTER (OUTPATIENT)
Age: 36
End: 2021-08-15

## 2021-08-31 ENCOUNTER — VIRTUAL VISIT (OUTPATIENT)
Dept: ENDOCRINOLOGY | Facility: CLINIC | Age: 36
End: 2021-08-31
Payer: COMMERCIAL

## 2021-08-31 DIAGNOSIS — E66.813 CLASS 3 SEVERE OBESITY DUE TO EXCESS CALORIES WITH SERIOUS COMORBIDITY AND BODY MASS INDEX (BMI) OF 50.0 TO 59.9 IN ADULT (H): ICD-10-CM

## 2021-08-31 DIAGNOSIS — Z71.3 NUTRITIONAL COUNSELING: Primary | ICD-10-CM

## 2021-08-31 DIAGNOSIS — E66.01 CLASS 3 SEVERE OBESITY DUE TO EXCESS CALORIES WITH SERIOUS COMORBIDITY AND BODY MASS INDEX (BMI) OF 50.0 TO 59.9 IN ADULT (H): ICD-10-CM

## 2021-08-31 PROCEDURE — 97803 MED NUTRITION INDIV SUBSEQ: CPT | Mod: 95 | Performed by: DIETITIAN, REGISTERED

## 2021-08-31 NOTE — LETTER
"8/31/2021       RE: Babita Rivas  [address]  [facility] [address]     Dear Colleague,    Thank you for referring your patient, Babita Rivas, to the [facility] [place] at [facility]. Please see a copy of my visit note below.    Babita Rivas is a 35 year old who is being evaluated via a billable video visit.      The patient has been notified of following:     \"This video visit will be conducted via a call between you and your physician/provider. We have found that certain health care needs can be provided without the need for an in-person physical exam.  This service lets us provide the care you need with a video conversation.  If a prescription is necessary we can send it directly to your pharmacy.  If lab work is needed we can place an order for that and you can then stop by our lab to have the test done at a later time.    Video visits are billed at different rates depending on your insurance coverage.  Please reach out to your insurance provider with any questions.    If during the course of the call the physician/provider feels a video visit is not appropriate, you will not be charged for this service.\"    Patient has given verbal consent for Video visit? Yes  How would you like to obtain your AVS? MyChart  If you are dropped from the video visit, the video invite should be resent to: Text to cell phone: [phone]  Will anyone else be joining your video visit? No     Video-Visit Details    Type of service:  Video Visit    Video Start Time: 4:16 PM  Video End Time: 4:38 PM      Originating Location (pt. Location): Home    Distant Location (provider location):  [facility] [place]     Platform used for Video Visit: [employer]    During this virtual visit the patient is located in [place], patient verifies this as the location during the entirety of this visit.     Return " "Bariatric Nutrition Consultation Note    Reason For Visit: Nutrition Assessment    Babita Rivas is a 35 year old presenting today for follow-up bariatric nutrition consult.  Pt is interested in laparoscopic sleeve gastrectomy with Dr. Fitzpatrick expected surgery TBD.  Patient is accompanied by self.      This is pt's 5th of 3rd required nutrition visits prior to surgery.     Pt referred by Arlette Corbett NP on February 15, 2021.    Patient with Co-morbidities of obesity including:  Type II DM no  Renal Failure no  Sleep apnea no  Hypertension no   Dyslipidemia no  Joint pain no  Back pain no  GERD no     Support System Reviewed With Patient 2/9/2021   Who do you have in your support network that can be available to help you for the first 2 weeks after surgery? My mother my kids my kids father   Who can you count on for support throughout your weight loss surgery journey? My family       ANTHROPOMETRICS:  Estimated body mass index is 52.26 kg/m  as calculated from the following:    Height as of 7/2/21: 1.6 m (5' 3\").    Weight as of 7/2/21: 133.8 kg (295 lb).     Current weight: 289 or 293 lbs at last doctor appt per pt.  Pt thought they said 289 lbs but doctor note says 293 lbs   * Pt has not checked weight since then. Will check tomorrow and send to me via [employer] message.    Required weight loss goal pre-op: 10 lbs from initial consult weight (goal weight 280 lbs or less before surgery)       2/9/2021   I have tried the following methods to lose weight Watching portions or calories, Exercise, Slimfast, Prescription Medications       Weight Loss Questions Reviewed With Patient 2/9/2021   How long have you been overweight? From Middle age and beyond     SUPPLEMENT INFORMATION:  None at this time    Medications:   Metformin and topiramate  - Stomach pain if eating metformin on empty stomach, makes her want to eat more.    NUTRITION HISTORY:  Pt concern with all the appointments prior to surgery, again discussed " appointments and reviewed task list with patient.     Does not have consistent meal patterns, typically only consumes 2 meals daily.      Diet Recall:  B: Milk  Snack: Banana/ fruit   L: Chicken tenders and salad   D: Crab legs with white rice.     August 3rd, 2021:    Working on completing task list, did not realize she might need more than one psych appt. Has three scheduled for Oct/Nov but is hoping to get them done earlier. She is waiting to hear back from a provider that she would only need 1 appt with for psych  Testing.     Pt has not seen RD since April. Was seeing [facility] regularly before she moved.     Lots of stress/grief - her partner's daughter was murdered recently, Kelley Anderson, in [place].    Pt making food for kids while on appt. Making tacos. Plans to eat some as well.  Daughter 17, son 14 and another kid that is 4. Also has kids through her partner's kids. Lots of support around for after surgery, per pt.     Struggling with weight loss - not sure if meds are helping.  Really hard to lose weight since having last child in particular she has noticed, feels like her muscles are all gone.    Busy at work - hard to fit in breakfast.   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 Metabolism decreased with less food.   Discussed getting protein bars or shakes to help with intake at breakfast time and already-made salads from [employer] for dinner    August 31st 2021  No major changes dietary wise, eating slightly more food.   Skipping breakfast. Went to store and got protein bars (special ks), did not like them at all. Needs to find new ones.     Meals: salads, chicken tenders, pasta, tortilla roll up  from Mexican restaurant     Worked on plan to help with getting 3 meals/day  Reviewed items on task list - pt seeing psych outside of [place] as she was able to get in sooner. Appt is Sept 20th per pt. Still needs to cancel her appts with [place].    PREVIOUS GOALS:  Try to eat something 3x/day.  Breakfast - get protein shakes/bars  Lunch - loves salads, discussed already made ones from [facility]  - Eating 2 meals/day and 1 snack    Recall Diet Questions Reviewed With Patient 2/9/2021   Describe what you typically consume for breakfast (typical or most recent): Nothing or palmer egg sausage   Describe what you typically consume for lunch (typical or most recent): Order out if I m at work   Describe what you typically consume for supper (typical or most recent): Chicken   Describe what you typically consume as snacks (typical or most recent): Chips   How many ounces of water, or other low calorie drinks, do you drink daily (8 oz=1 glass)? 24 oz   How many ounces of caffeine (coffee, tea, pop) do you drink daily (8 oz=1 glass)? 24 oz   How many ounces of carbonated (pop, beer, sparkling water) drinks do you drinky daily (8 oz=1 glass)? 32 oz - pop    How many ounces of juice, pop, sweet tea, sports drinks, protein drinks, other sweetened drinks, do you drink daily (8 oz=1 glass)? 32 oz   How many ounces of milk do you drink daily (8 oz=1 glass) 24 oz   Please indicate the type of milk: 2%   How often do you drink alcohol? 2-4 times a month   If you do drink alcohol, how many drinks might you have in a day? (one drink = 5 oz. wine, 1 can/bottle of beer, 1 shot liquor) 3 or 4       Eating Habits 2/9/2021   Do you have any dietary restrictions? No   Do you currently binge eat (eat a large amount of food in a short time)? No   Are you an emotional eater? No   Do you get up to eat after falling asleep? No   What foods do you crave? Crab legs shrimp       Dining Out History Reviewed With Patient 2/9/2021   How often do you  "dine out? Rarely.   Where do you dine out? (select all that apply) take out   What types of food do you order when you dine out? Sea food tami dickinson       Physical Activity Reviewed With Patient 2/9/2021   How often do you exercise? 1 to 2 times per week   What is the duration of your exercise (in minutes)? 15 Minutes   What types of exercise do you do? walking   What keeps you from being more active?  Pain       NUTRITION DIAGNOSIS:  Obesity r/t long history of self-monitoring deficit and excessive energy intake aeb BMI >30 kg/m2.    INTERVENTION:  Reviewed current dietary habits   Reviewed and modified previous goals - Pt has been working on the goals related to surgery but struggling with weight loss. Session focused on eating habits today.  Answered pt questions  Coordination of care - reviewed task list.   Nutrition education   AVS and handouts via [employer]    Questions Reviewed With Patient 2/9/2021   How ready are you to make changes regarding your weight? Number 1 = Not ready at all to make changes up to 10 = very ready. 10   How confident are you that you can change? 1 = Not confident that you will be successful making changes up to 10 = very confident. 10       Expected Engagement: fair    GOALS:  Breakfast ideas:  Oatmeal (if wanting to sweetened - honey/fruit better option than brown sugar) with hard boiled egg    Protein bar or shake as snack     From last appt:  Relating To Eating:  Eat slowly (20-30 minutes per meal), chewing foods well (25 chews per bite/applesauce consistency)  9\" Plate method (1/2 plate non-starchy vegetables/fruit, 1/4 plate lean protein, 1/4 plate whole grain starch - no more than 1/2 cup carb/meal)  Eat 3 meals per day  - Try a protein shake for breakfast (See protein shake options below)   Avoid snacking    Relating to beverages:  Reduce caffeine/carbonation/calorie containing beverages     My Plate Planner_English - Pt " Education  [email or web address]    Protein Sources for Weight Loss  [email or web address]     Carbohydrates  [email or web address]       Time spent with patient: 22 minutes.  BUNNY Rivera, RD, LD

## 2021-08-31 NOTE — PROGRESS NOTES
"Babita Rivas is a 35 year old who is being evaluated via a billable video visit.      The patient has been notified of following:     \"This video visit will be conducted via a call between you and your physician/provider. We have found that certain health care needs can be provided without the need for an in-person physical exam.  This service lets us provide the care you need with a video conversation.  If a prescription is necessary we can send it directly to your pharmacy.  If lab work is needed we can place an order for that and you can then stop by our lab to have the test done at a later time.    Video visits are billed at different rates depending on your insurance coverage.  Please reach out to your insurance provider with any questions.    If during the course of the call the physician/provider feels a video visit is not appropriate, you will not be charged for this service.\"    Patient has given verbal consent for Video visit? Yes  How would you like to obtain your AVS? MyChart  If you are dropped from the video visit, the video invite should be resent to: Text to cell phone: [phone]  Will anyone else be joining your video visit? No     Video-Visit Details    Type of service:  Video Visit    Video Start Time: 4:16 PM  Video End Time: 4:38 PM      Originating Location (pt. Location): Home    Distant Location (provider location):  [facility] [place]     Platform used for Video Visit: [employer]    During this virtual visit the patient is located in [place], patient verifies this as the location during the entirety of this visit.     Return Bariatric Nutrition Consultation Note    Reason For Visit: Nutrition Assessment    Babita Rivas is a 35 year old presenting today for follow-up bariatric nutrition consult.  Pt is interested in laparoscopic sleeve gastrectomy with Dr. Fitzpatrick expected surgery TBD.  Patient is accompanied by self.      This is pt's 5th of 3rd " "required nutrition visits prior to surgery.     Pt referred by Arlette Corbett NP on February 15, 2021.    Patient with Co-morbidities of obesity including:  Type II DM no  Renal Failure no  Sleep apnea no  Hypertension no   Dyslipidemia no  Joint pain no  Back pain no  GERD no     Support System Reviewed With Patient 2/9/2021   Who do you have in your support network that can be available to help you for the first 2 weeks after surgery? My mother my kids my kids father   Who can you count on for support throughout your weight loss surgery journey? My family       ANTHROPOMETRICS:  Estimated body mass index is 52.26 kg/m  as calculated from the following:    Height as of 7/2/21: 1.6 m (5' 3\").    Weight as of 7/2/21: 133.8 kg (295 lb).     Current weight: 289 or 293 lbs at last doctor appt per pt.  Pt thought they said 289 lbs but doctor note says 293 lbs   * Pt has not checked weight since then. Will check tomorrow and send to me via [employer] message.    Required weight loss goal pre-op: 10 lbs from initial consult weight (goal weight 280 lbs or less before surgery)       2/9/2021   I have tried the following methods to lose weight Watching portions or calories, Exercise, Slimfast, Prescription Medications       Weight Loss Questions Reviewed With Patient 2/9/2021   How long have you been overweight? From Middle age and beyond     SUPPLEMENT INFORMATION:  None at this time    Medications:   Metformin and topiramate  - Stomach pain if eating metformin on empty stomach, makes her want to eat more.    NUTRITION HISTORY:  Pt concern with all the appointments prior to surgery, again discussed appointments and reviewed task list with patient.     Does not have consistent meal patterns, typically only consumes 2 meals daily.      Diet Recall:  B: Milk  Snack: Banana/ fruit   L: Chicken tenders and salad   D: Crab legs with white rice.     August 3rd, 2021:    Working on completing task list, did not realize she might need more " than one psych appt. Has three scheduled for Oct/Nov but is hoping to get them done earlier. She is waiting to hear back from a provider that she would only need 1 appt with for psych  Testing.     Pt has not seen RD since April. Was seeing [facility] regularly before she moved.     Lots of stress/grief - her partner's daughter was murdered recently, Kelley Anderson, in [place].    Pt making food for kids while on appt. Making tacos. Plans to eat some as well.  Daughter 17, son 14 and another kid that is 4. Also has kids through her partner's kids. Lots of support around for after surgery, per pt.     Struggling with weight loss - not sure if meds are helping.  Really hard to lose weight since having last child in particular she has noticed, feels like her muscles are all gone.    Busy at work - hard to fit in breakfast.   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 Metabolism decreased with less food.   Discussed getting protein bars or shakes to help with intake at breakfast time and already-made salads from [employer] for dinner    August 31st 2021  No major changes dietary wise, eating slightly more food.   Skipping breakfast. Went to store and got protein bars (special ks), did not like them at all. Needs to find new ones.     Meals: salads, chicken tenders, pasta, tortilla roll up from Mexican restaurant     Worked on plan to help with getting 3 meals/day  Reviewed items on task list - pt seeing psych outside of [place] as she was able to get in sooner. Appt is Sept 20th per pt. Still needs to cancel her appts with [place].    PREVIOUS GOALS:  Try to eat something 3x/day.  Breakfast - get protein  shakes/bars  Lunch - loves salads, discussed already made ones from [employer]  - Eating 2 meals/day and 1 snack    Recall Diet Questions Reviewed With Patient 2/9/2021   Describe what you typically consume for breakfast (typical or most recent): Nothing or palmer egg sausage   Describe what you typically consume for lunch (typical or most recent): Order out if I m at work   Describe what you typically consume for supper (typical or most recent): Chicken   Describe what you typically consume as snacks (typical or most recent): Chips   How many ounces of water, or other low calorie drinks, do you drink daily (8 oz=1 glass)? 24 oz   How many ounces of caffeine (coffee, tea, pop) do you drink daily (8 oz=1 glass)? 24 oz   How many ounces of carbonated (pop, beer, sparkling water) drinks do you drinky daily (8 oz=1 glass)? 32 oz - pop    How many ounces of juice, pop, sweet tea, sports drinks, protein drinks, other sweetened drinks, do you drink daily (8 oz=1 glass)? 32 oz   How many ounces of milk do you drink daily (8 oz=1 glass) 24 oz   Please indicate the type of milk: 2%   How often do you drink alcohol? 2-4 times a month   If you do drink alcohol, how many drinks might you have in a day? (one drink = 5 oz. wine, 1 can/bottle of beer, 1 shot liquor) 3 or 4       Eating Habits 2/9/2021   Do you have any dietary restrictions? No   Do you currently binge eat (eat a large amount of food in a short time)? No   Are you an emotional eater? No   Do you get up to eat after falling asleep? No   What foods do you crave? Crab legs shrimp       Dining Out History Reviewed With Patient 2/9/2021   How often do you dine out? Rarely.   Where do you dine out? (select all that apply) take out   What types of food do you order when you dine out? Sea food samma teena       Physical Activity Reviewed With Patient 2/9/2021   How often do you exercise? 1 to 2 times per week   What is the duration of your exercise (in minutes)? 15 Minutes  "  What types of exercise do you do? walking   What keeps you from being more active?  Pain       NUTRITION DIAGNOSIS:  Obesity r/t long history of self-monitoring deficit and excessive energy intake aeb BMI >30 kg/m2.    INTERVENTION:  Reviewed current dietary habits   Reviewed and modified previous goals - Pt has been working on the goals related to surgery but struggling with weight loss. Session focused on eating habits today.  Answered pt questions  Coordination of care - reviewed task list.   Nutrition education   AVS and handouts via [employer]    Questions Reviewed With Patient 2/9/2021   How ready are you to make changes regarding your weight? Number 1 = Not ready at all to make changes up to 10 = very ready. 10   How confident are you that you can change? 1 = Not confident that you will be successful making changes up to 10 = very confident. 10       Expected Engagement: fair    GOALS:  Breakfast ideas:  Oatmeal (if wanting to sweetened - honey/fruit better option than brown sugar) with hard boiled egg    Protein bar or shake as snack     From last appt:  Relating To Eating:  Eat slowly (20-30 minutes per meal), chewing foods well (25 chews per bite/applesauce consistency)  9\" Plate method (1/2 plate non-starchy vegetables/fruit, 1/4 plate lean protein, 1/4 plate whole grain starch - no more than 1/2 cup carb/meal)  Eat 3 meals per day  - Try a protein shake for breakfast (See protein shake options below)   Avoid snacking    Relating to beverages:  Reduce caffeine/carbonation/calorie containing beverages     My Plate Planner_English - Pt Education  [email or web address]    Protein Sources for Weight Loss  [email or web address]     Carbohydrates  [email or web address]       Time spent with patient: 22 minutes.  BUNNY Rivera, RD, LD  "

## 2021-09-01 NOTE — PATIENT INSTRUCTIONS
"GOALS:  Breakfast ideas:  Oatmeal (if wanting to sweetened - honey/fruit better option than brown sugar) with hard boiled egg    Protein bar or shake as snack     From last appt:  Relating To Eating:  Eat slowly (20-30 minutes per meal), chewing foods well (25 chews per bite/applesauce consistency)  9\" Plate method (1/2 plate non-starchy vegetables/fruit, 1/4 plate lean protein, 1/4 plate whole grain starch - no more than 1/2 cup carb/meal)  Eat 3 meals per day  - Try a protein shake for breakfast (See protein shake options below)   Avoid snacking    Relating to beverages:  Reduce caffeine/carbonation/calorie containing beverages     My Plate Planner_English - Pt Education  [email or web address]    Protein Sources for Weight Loss  [email or web address]     Carbohydrates  [email or web address]       "

## 2021-09-30 ENCOUNTER — VIRTUAL VISIT (OUTPATIENT)
Dept: ENDOCRINOLOGY | Facility: CLINIC | Age: 36
End: 2021-09-30
Payer: COMMERCIAL

## 2021-09-30 DIAGNOSIS — E55.9 VITAMIN D DEFICIENCY: ICD-10-CM

## 2021-09-30 DIAGNOSIS — E66.01 CLASS 3 SEVERE OBESITY DUE TO EXCESS CALORIES WITH SERIOUS COMORBIDITY AND BODY MASS INDEX (BMI) OF 50.0 TO 59.9 IN ADULT (H): ICD-10-CM

## 2021-09-30 DIAGNOSIS — E66.813 CLASS 3 SEVERE OBESITY DUE TO EXCESS CALORIES WITH SERIOUS COMORBIDITY AND BODY MASS INDEX (BMI) OF 50.0 TO 59.9 IN ADULT (H): ICD-10-CM

## 2021-09-30 DIAGNOSIS — E11.9 TYPE 2 DIABETES MELLITUS WITHOUT COMPLICATION, WITHOUT LONG-TERM CURRENT USE OF INSULIN (H): ICD-10-CM

## 2021-09-30 DIAGNOSIS — Z71.3 NUTRITIONAL COUNSELING: Primary | ICD-10-CM

## 2021-09-30 PROCEDURE — 97803 MED NUTRITION INDIV SUBSEQ: CPT | Mod: 95 | Performed by: DIETITIAN, REGISTERED

## 2021-09-30 NOTE — PROGRESS NOTES
"Babita Rivas is a 36 year old who is being evaluated via a billable video visit.      The patient has been notified of following:     \"This video visit will be conducted via a call between you and your physician/provider. We have found that certain health care needs can be provided without the need for an in-person physical exam.  This service lets us provide the care you need with a video conversation.  If a prescription is necessary we can send it directly to your pharmacy.  If lab work is needed we can place an order for that and you can then stop by our lab to have the test done at a later time.    Video visits are billed at different rates depending on your insurance coverage.  Please reach out to your insurance provider with any questions.    If during the course of the call the physician/provider feels a video visit is not appropriate, you will not be charged for this service.\"    Patient has given verbal consent for Video visit? Yes  How would you like to obtain your AVS? MyChart  If you are dropped from the video visit, the video invite should be resent to: Text to cell phone: [phone]  Will anyone else be joining your video visit? No     Video-Visit Details    Type of service:  Video Visit    Video Start Time: 11:30 AM  Video End Time: 12:05 PM    Originating Location (pt. Location): Home    Distant Location (provider location):  [facility] [place]     Platform used for Video Visit: [employer]    During this virtual visit the patient is located in [place], patient verifies this as the location during the entirety of this visit.     Return Bariatric Nutrition Consultation Note    Reason For Visit: Nutrition Assessment    Babita Rivas is a 35 year old presenting today for follow-up bariatric nutrition consult.  Pt is interested in laparoscopic sleeve gastrectomy with Dr. Fitzpatrick expected surgery TBD.  Patient is accompanied by self.      This is pt's 6th of 3rd " "required nutrition visits prior to surgery.     Pt referred by Arlette Corbett NP on February 15, 2021.    Patient with Co-morbidities of obesity including:  Type II DM yes, dx in May with A1C 6.5, now 6.2 as of 9/8  Renal Failure no  Sleep apnea no  Hypertension no   Dyslipidemia no  Joint pain no  Back pain no  GERD no     Support System Reviewed With Patient 2/9/2021   Who do you have in your support network that can be available to help you for the first 2 weeks after surgery? My mother my kids my kids father   Who can you count on for support throughout your weight loss surgery journey? My family       ANTHROPOMETRICS:  Consult weight: 290 lbs     Estimated body mass index is 52.26 kg/m  as calculated from the following:    Height as of 7/2/21: 1.6 m (5' 3\").    Weight as of 7/2/21: 133.8 kg (295 lb).     Current weight: 290 lbs    Required weight loss goal pre-op: 10 lbs from initial consult weight (goal weight 280 lbs or less before surgery)       2/9/2021   I have tried the following methods to lose weight Watching portions or calories, Exercise, Slimfast, Prescription Medications       Weight Loss Questions Reviewed With Patient 2/9/2021   How long have you been overweight? From Middle age and beyond     SUPPLEMENT INFORMATION:  None at this time per pt report today.    Pt was recommended to take Vit D due to low Vit d. Labs done 5/11/21 showed Vit D of 9. Prescription for 50,000 IUs/week was sent to the pharmacy at that time. Was recommended to take 1,000 to 2,000 IUs/day following that dose     Medications:   Metformin and topiramate  - Stomach pain if taking metformin on empty stomach, makes her want to eat more.    NUTRITION HISTORY:      August 3rd, 2021:    Working on completing task list, did not realize she might need more than one psych appt. Has three scheduled for Oct/Nov but is hoping to get them done earlier. She is waiting to hear back from a provider that she would only need 1 appt with for psych " testing.     Pt has not seen RD since April. Was seeing [facility] regularly before she moved.     Lots of stress/grief - her partner's daughter was murdered recently, Kelley Anderson, in [place].    Pt making food for kids while on appt. Making tacos. Plans to eat some as well.  Daughter 17, son 14 and another kid that is 4. Also has kids through her partner's kids. Lots of support around for after surgery, per pt.     Struggling with weight loss - not sure if meds are helping.  Really hard to lose weight since having last child in particular she has noticed, feels like her muscles are all gone.    Busy at work - hard to fit in breakfast.   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 Metabolism decreased with less food.   Discussed getting protein bars or shakes to help with intake at breakfast time and already-made salads from [employer] for dinner    August 31st 2021  No major changes dietary wise, eating slightly more food.   Skipping breakfast. Went to store and got protein bars (special ks), did not like them at all. Needs to find new ones.     Meals: salads, chicken tenders, pasta, tortilla roll up from Mexican restaurant     Worked on plan to help with getting 3 meals/day  Reviewed items on task list - pt seeing psych outside of [place] as she was able to get in sooner. Appt is Sept 20th per pt. Still needs to cancel her appts with [facility].    Sept 30th 2021:     Feeling sad today - struggling with eating and weight per pt. Saw her PCP just before this appt.     Struggling with meal ideas due to her diabetes. Recently dx as of May 2021.     Trying to do something for breakfast. Reports eating lots of eggs and  found a protein bar she likes.     Reviewed task list. Pt had her psych eval yesterday - reports it went well but was long (10-2).   Aware of need for liquid diet for 2 weeks before surgery if not at [facility] - okay wit.   Discussed post op diet and vitamins. Will send handouts to pt today. Pt reports being okay with dietary changes but feels her biggest barrier is access. She wishes someone could just make the food for her. Hard to find time/energy. Discussed hair loss as pt reports this is a major concern of hers. Reviewed importance of taking supplements as prescribed and getting good nutrition after surgery.     Physical activity:  Limited by pain, mobility and energy.  Back hurts all the time.   Pt shared she is excited to hopefully be able to workout after surgery.    Recall Diet Questions Reviewed With Patient 2/9/2021   Describe what you typically consume for breakfast (typical or most recent): Nothing or palmer egg sausage   Describe what you typically consume for lunch (typical or most recent): Order out if I m at work   Describe what you typically consume for supper (typical or most recent): Chicken   Describe what you typically consume as snacks (typical or most recent): Chips   How many ounces of water, or other low calorie drinks, do you drink daily (8 oz=1 glass)? 24 oz   How many ounces of caffeine (coffee, tea, pop) do you drink daily (8 oz=1 glass)? 24 oz   How many ounces of carbonated (pop, beer, sparkling water) drinks do you drinky daily (8 oz=1 glass)? 32 oz - pop    How many ounces of juice, pop, sweet tea, sports drinks, protein drinks, other sweetened drinks, do you drink daily (8 oz=1 glass)? 32 oz   How many ounces of milk do you drink daily (8 oz=1 glass) 24 oz   Please indicate the type of milk: 2%   How often do you drink alcohol? 2-4 times a month   If you do drink alcohol, how many drinks might you have in a day? (one drink = 5 oz. wine, 1 can/bottle of beer, 1 shot liquor) 3 or 4        Eating Habits 2/9/2021   Do you have any dietary restrictions? No   Do you currently binge eat (eat a large amount of food in a short time)? No   Are you an emotional eater? No   Do you get up to eat after falling asleep? No   What foods do you crave? Crab legs shrimp       Dining Out History Reviewed With Patient 2/9/2021   How often do you dine out? Rarely.   Where do you dine out? (select all that apply) take out   What types of food do you order when you dine out? Sea food pasta burger       Physical Activity Reviewed With Patient 2/9/2021   How often do you exercise? 1 to 2 times per week   What is the duration of your exercise (in minutes)? 15 Minutes   What types of exercise do you do? walking   What keeps you from being more active?  Pain       NUTRITION DIAGNOSIS:  Obesity r/t long history of self-monitoring deficit and excessive energy intake aeb BMI >30 kg/m2.    INTERVENTION:  Reviewed current dietary habits   Reviewed and modified previous goals - Pt has been working on the goals related to surgery but shared she is continuing to struggle with weight loss and eating habits.   Provided encouraged and support.   Answered pt questions  Coordination of care - reviewed task list.   Nutrition education   AVS and handouts via [employer]    Questions Reviewed With Patient 2/9/2021   How ready are you to make changes regarding your weight? Number 1 = Not ready at all to make changes up to 10 = very ready. 10   How confident are you that you can change? 1 = Not confident that you will be successful making changes up to 10 = very confident. 10     Expected Engagement: fair    GOALS:  1. Aim for 3 meals/day    2. Continue working on goals related to surgery (chewing food well/pace, limiting caffeine, no carbonation after surgery, taking supplements as prescribed)     RESOURCES:    Healthy Living with Diabetes  https://www.diabetes.org/healthy-living     Diabetes Friendly  Recipes  https://www.diabetes.org/healthy-living/recipes-nutrition     Take the following after a Sleeve Gastrectomy:  - Multivitamin/minerals: adult dose 1-2 times daily  Ideally want one that provides:   5,000 - 10,000 international units vitamin A daily   800 mg oral folate daily  8 - 22 mg zinc and 1 - 2 mg copper daily  - Iron: 45-60 mg elemental (18-36 mg if low risk) - may partly or fully be covered in multivitamin   - Calcium Citrate containing vitamin D: 500 mg 3 times daily or 600 mg 2 times daily     - Separate the calcium from your multivitmain or iron by at least 2 hours.     - Must be a chewable calcium citrate until post-op 3 months     - Options for calcium citrate: Dominik calcium citrate chewable, bariatric advantage calcium citrate chewable, Celebrate vitamins calcium citrate chewable, Bariatric Fusion calcium citrate chewable  - Vitamin D - at least 3,000 international units/day between all supplements  - Vitamin B12: sublingual form of at least 500 mcg daily or injection of 1000 mcg monthly   - B-50 Complex once daily (if multivitamin contains less than 12 mg thiamin)    Post-op Diet Handouts:  Diet Guidelines after Weight-loss Surgery  [email or web address]     Your Stage 1 Diet: Clear Liquids  [email or web address]     Your Stage 2 Diet: Low-fat Full Liquids  [email or web address]     Your Stage 3 Diet: Pureed Foods  [email or web address]     Pureed Recipes  [email or web address]    Your Stage 4 Diet: Soft Foods  [email or web address]    Your Stage 5 Diet: Regular Foods  [email or web address]        Follow up: Wednesday, October 27th at 11 am    Time spent with patient: 35 minutes.  BUNNY Rivera, RD, LD

## 2021-09-30 NOTE — LETTER
"9/30/2021       RE: Babita Rivas  [address]  [facility] [address]     Dear Colleague,    Thank you for referring your patient, Babita Rivas, to the [facility] [place] at [facility]. Please see a copy of my visit note below.    Babita Rivas is a 36 year old who is being evaluated via a billable video visit.      The patient has been notified of following:     \"This video visit will be conducted via a call between you and your physician/provider. We have found that certain health care needs can be provided without the need for an in-person physical exam.  This service lets us provide the care you need with a video conversation.  If a prescription is necessary we can send it directly to your pharmacy.  If lab work is needed we can place an order for that and you can then stop by our lab to have the test done at a later time.    Video visits are billed at different rates depending on your insurance coverage.  Please reach out to your insurance provider with any questions.    If during the course of the call the physician/provider feels a video visit is not appropriate, you will not be charged for this service.\"    Patient has given verbal consent for Video visit? Yes  How would you like to obtain your AVS? MyChart  If you are dropped from the video visit, the video invite should be resent to: Text to cell phone: [phone]  Will anyone else be joining your video visit? No     Video-Visit Details    Type of service:  Video Visit    Video Start Time: 11:30 AM  Video End Time: 12:05 PM    Originating Location (pt. Location): Home    Distant Location (provider location):  [facility] [place]     Platform used for Video Visit: [employer]    During this virtual visit the patient is located in [place], patient verifies this as the location during the entirety of this visit.     Return " "Bariatric Nutrition Consultation Note    Reason For Visit: Nutrition Assessment    Babita Rivas is a 35 year old presenting today for follow-up bariatric nutrition consult.  Pt is interested in laparoscopic sleeve gastrectomy with Dr. Fitzpatrick expected surgery TBD.  Patient is accompanied by self.      This is pt's 6th of 3rd required nutrition visits prior to surgery.     Pt referred by Arlette Corbett NP on February 15, 2021.    Patient with Co-morbidities of obesity including:  Type II DM yes, dx in May with A1C 6.5, now 6.2 as of 9/8  Renal Failure no  Sleep apnea no  Hypertension no   Dyslipidemia no  Joint pain no  Back pain no  GERD no     Support System Reviewed With Patient 2/9/2021   Who do you have in your support network that can be available to help you for the first 2 weeks after surgery? My mother my kids my kids father   Who can you count on for support throughout your weight loss surgery journey? My family       ANTHROPOMETRICS:  Consult weight: 290 lbs     Estimated body mass index is 52.26 kg/m  as calculated from the following:    Height as of 7/2/21: 1.6 m (5' 3\").    Weight as of 7/2/21: 133.8 kg (295 lb).     Current weight: 290 lbs    Required weight loss goal pre-op: 10 lbs from initial consult weight (goal weight 280 lbs or less before surgery)       2/9/2021   I have tried the following methods to lose weight Watching portions or calories, Exercise, Slimfast, Prescription Medications       Weight Loss Questions Reviewed With Patient 2/9/2021   How long have you been overweight? From Middle age and beyond     SUPPLEMENT INFORMATION:  None at this time per pt report today.    Pt was recommended to take Vit D due to low Vit d. Labs done 5/11/21 showed Vit D of 9. Prescription for 50,000 IUs/week was sent to the pharmacy at that time. Was recommended to take 1,000 to 2,000 IUs/day following that dose     Medications:   Metformin and topiramate  - Stomach pain if taking metformin on empty " stomach, makes her want to eat more.    NUTRITION HISTORY:      August 3rd, 2021:    Working on completing task list, did not realize she might need more than one psych appt. Has three scheduled for Oct/Nov but is hoping to get them done earlier. She is waiting to hear back from a provider that she would only need 1 appt with for psych testing.     Pt has not seen RD since April. Was seeing [facility] regularly before she moved.     Lots of stress/grief - her partner's daughter was murdered recently, Kelley Anderson, in [place].    Pt making food for kids while on appt. Making tacos. Plans to eat some as well.  Daughter 17, son 14 and another kid that is 4. Also has kids through her partner's kids. Lots of support around for after surgery, per pt.     Struggling with weight loss - not sure if meds are helping.  Really hard to lose weight since having last child in particular she has noticed, feels like her muscles are all gone.    Busy at work - hard to fit in breakfast.   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 Metabolism decreased with less food.   Discussed getting protein bars or shakes to help with intake at breakfast time and already-made salads from [employer] for dinner    August 31st 2021  No major changes dietary wise, eating slightly more food.   Skipping breakfast. Went to store and got protein bars (special ks), did not like them at all. Needs to find new ones.     Meals: salads, chicken tenders, pasta, tortilla roll up from Mexican restaurant     Worked on plan to help with getting 3 meals/day  Reviewed items on task list - pt seeing psych outside of [place] as she was able to get in sooner. Appt is  Sept 20th per pt. Still needs to cancel her appts with [facility].    Sept 30th 2021:     Feeling sad today - struggling with eating and weight per pt. Saw her PCP just before this appt.     Struggling with meal ideas due to her diabetes. Recently dx as of May 2021.     Trying to do something for breakfast. Reports eating lots of eggs and found a protein bar she likes.     Reviewed task list. Pt had her psych eval yesterday - reports it went well but was long (10-2).   Aware of need for liquid diet for 2 weeks before surgery if not at [facility] - okay wit.   Discussed post op diet and vitamins. Will send handouts to pt today. Pt reports being okay with dietary changes but feels her biggest barrier is access. She wishes someone could just make the food for her. Hard to find time/energy. Discussed hair loss as pt reports this is a major concern of hers. Reviewed importance of taking supplements as prescribed and getting good nutrition after surgery.     Physical activity:  Limited by pain, mobility and energy.  Back hurts all the time.   Pt shared she is excited to hopefully be able to workout after surgery.    Recall Diet Questions Reviewed With Patient 2/9/2021   Describe what you typically consume for breakfast (typical or most recent): Nothing or palmer egg sausage   Describe what you typically consume for lunch (typical or most recent): Order out if I m at work   Describe what you typically consume for supper (typical or most recent): Chicken   Describe what you typically consume as snacks (typical or most recent): Chips   How many ounces of water, or other low calorie drinks, do you drink daily (8 oz=1 glass)? 24 oz   How many ounces of caffeine (coffee, tea, pop) do you drink daily (8 oz=1 glass)? 24 oz   How many ounces of carbonated (pop, beer, sparkling water) drinks do you drinky daily (8 oz=1 glass)? 32 oz - pop    How many ounces of juice, pop, sweet tea, sports drinks, protein drinks, other sweetened drinks, do  you drink daily (8 oz=1 glass)? 32 oz   How many ounces of milk do you drink daily (8 oz=1 glass) 24 oz   Please indicate the type of milk: 2%   How often do you drink alcohol? 2-4 times a month   If you do drink alcohol, how many drinks might you have in a day? (one drink = 5 oz. wine, 1 can/bottle of beer, 1 shot liquor) 3 or 4       Eating Habits 2/9/2021   Do you have any dietary restrictions? No   Do you currently binge eat (eat a large amount of food in a short time)? No   Are you an emotional eater? No   Do you get up to eat after falling asleep? No   What foods do you crave? Crab legs shrimp       Dining Out History Reviewed With Patient 2/9/2021   How often do you dine out? Rarely.   Where do you dine out? (select all that apply) take out   What types of food do you order when you dine out? Sea food samma teena       Physical Activity Reviewed With Patient 2/9/2021   How often do you exercise? 1 to 2 times per week   What is the duration of your exercise (in minutes)? 15 Minutes   What types of exercise do you do? walking   What keeps you from being more active?  Pain       NUTRITION DIAGNOSIS:  Obesity r/t long history of self-monitoring deficit and excessive energy intake aeb BMI >30 kg/m2.    INTERVENTION:  Reviewed current dietary habits   Reviewed and modified previous goals - Pt has been working on the goals related to surgery but shared she is continuing to struggle with weight loss and eating habits.   Provided encouraged and support.   Answered pt questions  Coordination of care - reviewed task list.   Nutrition education   AVS and handouts via [employer]    Questions Reviewed With Patient 2/9/2021   How ready are you to make changes regarding your weight? Number 1 = Not ready at all to make changes up to 10 = very ready. 10   How confident are you that you can change? 1 = Not confident that you will be successful making changes up to 10 = very confident. 10     Expected Engagement:  fair    GOALS:  1. Aim for 3 meals/day    2. Continue working on goals related to surgery (chewing food well/pace, limiting caffeine, no carbonation after surgery, taking supplements as prescribed)     RESOURCES:    Healthy Living with Diabetes  https://www.diabetes.org/healthy-living     Diabetes Friendly Recipes  https://www.diabetes.org/healthy-living/recipes-nutrition     Take the following after a Sleeve Gastrectomy:  - Multivitamin/minerals: adult dose 1-2 times daily  Ideally want one that provides:   5,000 - 10,000 international units vitamin A daily   800 mg oral folate daily  8 - 22 mg zinc and 1 - 2 mg copper daily  - Iron: 45-60 mg elemental (18-36 mg if low risk) - may partly or fully be covered in multivitamin   - Calcium Citrate containing vitamin D: 500 mg 3 times daily or 600 mg 2 times daily     - Separate the calcium from your multivitmain or iron by at least 2 hours.     - Must be a chewable calcium citrate until post-op 3 months     - Options for calcium citrate: Dominik calcium citrate chewable, bariatric advantage calcium citrate chewable, Celebrate vitamins calcium citrate chewable, Bariatric Fusion calcium citrate chewable  - Vitamin D - at least 3,000 international units/day between all supplements  - Vitamin B12: sublingual form of at least 500 mcg daily or injection of 1000 mcg monthly   - B-50 Complex once daily (if multivitamin contains less than 12 mg thiamin)    Post-op Diet Handouts:  Diet Guidelines after Weight-loss Surgery  [email or web address]     Your Stage 1 Diet: Clear Liquids  [email or web address]     Your Stage 2 Diet: Low-fat Full Liquids  [email or web address]     Your Stage 3 Diet: Pureed Foods  [email or web address]     Pureed Recipes  [email or web address]    Your Stage 4 Diet: Soft Foods  [email or web address]    Your Stage 5 Diet: Regular Foods  [email or web address]        Follow up: Wednesday, October 27th at 11  am    Time spent with patient: 35 minutes.  BUNNY Rivera, RD, LD

## 2021-09-30 NOTE — PATIENT INSTRUCTIONS
GOALS:  1. Aim for 3 meals/day    2. Continue working on goals related to surgery (chewing food well/pace, limiting caffeine, no carbonation after surgery, taking supplements as prescribed)     RESOURCES:    Healthy Living with Diabetes  https://www.diabetes.org/healthy-living     Diabetes Friendly Recipes  https://www.diabetes.org/healthy-living/recipes-nutrition     Take the following after a Sleeve Gastrectomy:  - Multivitamin/minerals: adult dose 1-2 times daily  Ideally want one that provides:   5,000 - 10,000 international units vitamin A daily   800 mg oral folate daily  8 - 22 mg zinc and 1 - 2 mg copper daily  - Iron: 45-60 mg elemental (18-36 mg if low risk) - may partly or fully be covered in multivitamin   - Calcium Citrate containing vitamin D: 500 mg 3 times daily or 600 mg 2 times daily     - Separate the calcium from your multivitmain or iron by at least 2 hours.     - Must be a chewable calcium citrate until post-op 3 months     - Options for calcium citrate: Dominik calcium citrate chewable, bariatric advantage calcium citrate chewable, Celebrate vitamins calcium citrate chewable, Bariatric Fusion calcium citrate chewable  - Vitamin D - at least 3,000 international units/day between all supplements  - Vitamin B12: sublingual form of at least 500 mcg daily or injection of 1000 mcg monthly   - B-50 Complex once daily (if multivitamin contains less than 12 mg thiamin)    Post-op Diet Handouts:  Diet Guidelines after Weight-loss Surgery  [email or web address]     Your Stage 1 Diet: Clear Liquids  [email or web address]     Your Stage 2 Diet: Low-fat Full Liquids  [email or web address]     Your Stage 3 Diet: Pureed Foods  [email or web address]     Pureed Recipes  [email or web address]    Your Stage 4 Diet: Soft Foods  [email or web address]    Your Stage 5 Diet: Regular Foods  [email or web address]      Follow up: Wednesday, October 27th at 11 am

## 2021-10-05 ENCOUNTER — VIRTUAL VISIT (OUTPATIENT)
Dept: ENDOCRINOLOGY | Facility: CLINIC | Age: 36
End: 2021-10-05
Payer: COMMERCIAL

## 2021-10-05 VITALS — HEIGHT: 63 IN | BODY MASS INDEX: 51.38 KG/M2 | WEIGHT: 290 LBS

## 2021-10-05 DIAGNOSIS — E66.01 CLASS 3 SEVERE OBESITY DUE TO EXCESS CALORIES WITH SERIOUS COMORBIDITY AND BODY MASS INDEX (BMI) OF 50.0 TO 59.9 IN ADULT (H): Primary | ICD-10-CM

## 2021-10-05 DIAGNOSIS — E66.813 CLASS 3 SEVERE OBESITY DUE TO EXCESS CALORIES WITH SERIOUS COMORBIDITY AND BODY MASS INDEX (BMI) OF 50.0 TO 59.9 IN ADULT (H): Primary | ICD-10-CM

## 2021-10-05 PROBLEM — E11.9 DIABETES MELLITUS, TYPE 2 (H): Status: ACTIVE | Noted: 2021-10-05

## 2021-10-05 PROCEDURE — 99214 OFFICE O/P EST MOD 30 MIN: CPT | Mod: 95 | Performed by: NURSE PRACTITIONER

## 2021-10-05 RX ORDER — SERTRALINE HYDROCHLORIDE 25 MG/1
25 TABLET, FILM COATED ORAL DAILY
COMMUNITY
Start: 2021-09-08

## 2021-10-05 RX ORDER — HYDROXYZINE HYDROCHLORIDE 10 MG/1
10 TABLET, FILM COATED ORAL
COMMUNITY
Start: 2021-09-28

## 2021-10-05 ASSESSMENT — MIFFLIN-ST. JEOR: SCORE: 1974.56

## 2021-10-05 ASSESSMENT — PAIN SCALES - GENERAL: PAINLEVEL: MODERATE PAIN (5)

## 2021-10-05 NOTE — PATIENT INSTRUCTIONS
"Thank you for allowing us the privilege of caring for you. We hope we provided you with the excellent service you deserve.   Please let us know if there is anything else we can do for you so that we can be sure you are completely satisfied with your care experience.    To ensure the quality of our services you may be receiving a patient satisfaction survey from an independent patient satisfaction monitoring company.    The greatest compliment you can give is a \"Likely to Recommend\"    Your visit was with Arlette Corbett NP today.    Instructions per today's visit:     Aaron Rivas, it was great to visit with you today.  Here is a review of our visit.  If our clinic scheduler is not able to reach you please call [phone] to schedule your next appointments.    -continue metformin  -continue topiramate  -continue zoloft  -take all of your medications daily   -Call [facility] at [phone] for scheduling.   -continue with weight loss goals  -consider ozempic for weight loss and diabetes- see information below       Information about Video Visits with [facility]: video visit information  _________________________________________________________________________________________________________________________________________________________  Important contact and scheduling information:  Please call our contact center at [phone] to schedule your next appointments.  To find a lab location near you, please call [phone].  For any nursing questions or concerns call Carmina Cavazos LPN at [phone] or Sandrine Salazar RN at [phone]  Please call during clinic hours Monday through Friday 8:00a - 4:00p if you have questions or you can contact us via MyChart at anytime and we will reply during clinic hours.    Lab results will be communicated through My Chart or letter (if My Chart not used). Please call the clinic if you have not received communication after 1 week or if you have any questions.?  Clinic " Fax: [phone]  _________________________________________________________________________________________________________________________________________________________  Meal Replacement Products:    Here is the link to our new e-store where you can purchase our meal replacement products     [facility] E-Store  A.P.Pharma.Contrib/store    The one week starter kit is a great way to sample a variety of products and see what works for you.    If you want more information about the product go to: Fresh Steps Meals  [place].Platfora    If you are an employee or [facility] or [facility] please contact your care team for a 10% estore discount    Free Shipping for orders over $75     Benefits of meal replacements products:    Portion and calorie control  Improved nutrition  Structured eating  Simplified food choices  Avoid contact with trigger foods  _________________________________________________________________________________________________________________________________________________________  Interested in working with a health ?  Health coaches work with you to improve your overall health and wellbeing.  They look at the whole person, and may involve discussion of different areas of life, including, but not limited to the four pillars of health (sleep, exercise, nutrition, and stress management). Discuss with your care team if you would like to start working a health .  Health Coaching-3 Pack: Schedule by calling [phone]    $99 for three health coaching visits    Visits may be done in person or via phone    Coaching is a partnership between the  and the client; Coaches do not prescribe or diagnose    Coaching helps inspire the client to reach his/her personal goals   _________________________________________________________________________________________________________________________________________________________  24 Week Healthy Lifestyle  Plan:    Our mission in the 24-week Healthy Lifestyle Plan is to provide you with individualized care by giving you the tools, education and support you need to lose weight and maintain a healthy lifestyle. In your 24-week journey, you ll be supported by a dedicated weight loss team that includes registered dietitians, medical weight management providers, health coaches, and nurses -- all with special expertise in weight loss -- to help you every step of the way.     Monthly meetings with your registered dietician or medical weight management provider help to review your progress, update your care plan, and make any adjustments needed to ensure success. Between these visits, weekly and bi-weekly health  visits will help you focus on the four pillars of weight loss -- stress, sleep, nutrition, and exercise -- and how you can best adapt each to achieve sustainable weight loss results.    In addition, you will be given exclusive access to online wellbeing classes through [employer].  Your initial visit will be with a medical weight management provider who will help to understand your weight loss goals and ensure this program is the right fit for you. Please let our team know if you are interested in the 24 week plan by sending a message to your care team or calling [phone] to schedule.  _________________________________________________________________________________________________________________________________________________________    COMPREHENSIVE WEIGHT MANAGEMENT PROGRAM  VIRTUAL SUPPORT GROUPS    For Support Group Information:      We offer support groups for patients who are working on weight loss and considering, preparing for or have had weight loss surgery.   There is no cost for this opportunity.  You are invited to attend the?Virtual Support Groups?provided by any of the following locations:    1. [facility] via [employer] Teams with Shayy Hurley RN  2.   [place] via [employer] Teams with Abel  "Glenn, PhD, LP  3.   [place] via [employer] Teams with Kira Richardson RN  4.   [facility] via [employer] Teams with Kira Hartley [facility]    The following Support Group information can also be found on our website:  [email or web address]      [facility] Weight Loss Surgery Support Group    [facility] Weight Loss Surgery Support Group  The support group is a patient-lead forum that meets monthly to share experiences, encouragement and education. It is open to those who have had weight loss surgery, are scheduled for surgery, and those who are considering surgery.   WHEN: This group meets on the 3rd Wednesday of each month from 5:00PM - 6:00PM virtually using Microsoft Teams.   FACILITATOR: Led by Shayy Hurley, the program's Clinical Coordinator.   TO REGISTER: Please contact the clinic via [employer] or call the nurse line directly at [phone] to inform our staff that you would like an invite sent to you and to let us know the email you would like the invite sent to. Prior to the meeting, a link with directions on how to join the meeting will be sent to you.    2021 Meetings  August 18: \"Let's Talk\" a time for the group to share.  September 15: \"Let's Talk\" a time for the group to share.  October 20: \"Let's Talk\" a time for the group to share.  November 17: Norma Perez RD, VAUGHN \"Protein, Metabolism and Meal Planning\"  December 15: Jeffrey Roth RD, VAUGHN will speak, \"Recipe Modification\"    [facility] Support Groups    Connections: Bariatric Care Support Group?  This is open to all [facility] (and those external to this program) pre- and post- operative bariatric surgery patients as well as their support system.   WHEN: This group meets the 2nd Tuesday of each month from 6:30 PM - 8:00 PM virtually using Microsoft Teams.   FACILITATOR: Led by Abel Elizabeth, Ph.D who is a " "Licensed Psychologist with the [facility] Comprehensive Weight Management Program.   TO REGISTER: Please send an email to Abel Elizabeth, Ph.D., LP at?[email or web address]?if you would like an invitation to the group and to learn about using Microsoft Teams.    2021 Meetings  August 10: Open Forum  September 14: Guest Speaker: Kira Richardson RN,CBN, CIC, [facility] Comprehensive Weight Management Program, \"Your Hospital Stay and Post-Operative Compliance\"  October 12: Open Forum  November 9: Guest Speaker: Sammie Smith RD,LD, [facility] Comprehensive Weight Management Program,\"Holiday Eating\".  December 14: Guest Speaker Ami Morrison MD, MPH, [facility], \"Body Contouring Surgery for Bariatric Surgery Patients\"     Connections: Post-Operative Bariatric Surgery Support Group  This is a support group for [facility] bariatric patients (and those external to [facility]) who have had bariatric surgery and are at least 3 months post-surgery.  WHEN: This support group meets the 4th Wednesday of the month from 11:00 AM - 12:00 PM virtually using Microsoft Teams.   FACILITATOR: Led by Certified Bariatric Nurse, Kira Richardson RN.   TO REGISTER: Please send an email to Kira at [email or web address] if you would like an invitation to the group and to learn about using [employer].      [facility] Healthy Lifestyle Virtual Support Group    Healthy Lifestyle Virtual Support Group?  This is 60 minutes of small group guided discussion, support and resources. All are welcome who want a healthy lifestyle.  WHEN: This group meets monthly on a Friday from 12:30 PM - to 1:30 PM virtually using Microsoft Teams.   FACILITATOR: Led by National Board Certified Health , Kira Hartley [facility].   TO REGISTER: Please send an email to Kira at?[email or web address] to receive monthly invites to the group or if you have any " questions about having a health .  Prior to the meeting, a link with directions on how to join the meeting will be sent to you.    2021 Meetings  August 27: Open Forum  September 24: Sleep and the 7 Types of Rest  October 29: Open Forum  November 19: Gratitude     December 10: Open Forum  _________________________________________________________________________________________________________________________________________________________  [facility] Get Moving Program  Our team of physical therapists is trained to help you understand and take control of your condition. They will perform a thorough evaluation to determine your ability for activity and develop a customized plan to fit your goals and physical ability.  Scheduling: Unsure if the Get Moving program is right for you? Discuss the program with your medical provider or diabetes educator. You can also call us at [phone] to ask questions or schedule an appointment.   [facility] Get Moving Program  _________________________________________________________________________________________________________________________________________________________  [facility] Diabetes Prevention Program (DPP)  If you have prediabetes and Medicare please contact us via [employer] to learn more about the Diabetes Prevention Program (DPP)  Program Details:  [facility] offers the year-long Diabetes Prevention Program (DPP). The program helps you to make lifestyle changes that prevent or delay type 2 diabetes by supporting healthy eating, increased physical activity, stress reduction and use of coping skills.   On average, previous [facility] DPP cohorts have lost and maintained at least 5% of their starting weight throughout the program and averaged more than 150 minutes of physical activity per week.  Participants meet weekly for one-hour group sessions over sixteen weeks, every other week for the next 8 weeks, and monthly for the last  six months.   A year-long maintenance program is also available for participants who complete the first year.   Location & Cost:   During the COVID-19 Public Health Emergency, the program is offered virtually. When in-person classes can resume, they will be held at [facility].  For people with Medicare, the program is covered in full. A self-pay option will also be available for those with non-Medicare insurance plans.   _________________________________________________________________________________________________________________________________________________________  Bluetooth Scale:    We hope to provide you with high quality virtual healthcare visits while social distancing for COVID-19 is necessary, as well as in the future when virtual visits may be more convenient for you.     Our technology team made it possible for Bluetooth scales to send weight measurements to our electronic medical record. This allows weights from you weighing at home to securely flow into the medical record, which will improve telephone and virtual visits.   Additionally, studies have shown that adults actually lose more weight when their weights are automatically sent to someone else, and also that this process is not stressful for those adults.    Below is a link for purchasing the scale, with a discount code for our patients. You may call your insurance company to see if they will reimburse you for the cost of the scale, as a piece of durable medical equipment. The scales only go up to a weight of 400 pounds. This is an issue and we are working with the developer on increasing this. We found no scales that go over 400lb that have blue-tooth for connecting to Consulted.    Scale to purchase: the JJ PHARMA  Body  Scale: [email or web address]=aw.ds    Discount Code: We have a discount code for our patients to bring  the cost down to $50, Discount code is: [place]_Scale_20%off      Thank you,   [facility] Comprehensive Weight Management Team        MEDICATION STARTED AT THIS APPOINTMENT    We are starting a GLP-1 (Glucagon-like Peptide-1) medication. Examples of this medication include Byetta, Bydureon, or Victoza, etc. The medication chosen will depend on insurance coverage, and can be changed to the medication that is covered under your plan. These medications work the same, in that they can help you lose weight and control your blood sugar. One of the ways it works is by slowing down the rate that food leaves your stomach. You feel moran and will eat less.  It also helps regulate hormones that can help improve your blood sugars.    Usually short acting insulins or oral agents are stopped or decreased by the doctor at the appointment. Low blood sugars are rare but can happen if patients are on insulin or other oral agents. If you notice consistent low sugars or high sugars, your medication may need to be adjusted after your appointment. If this is the case, please call RN and provide her your blood sugar record from the last 3-4 days. The nurse will get in touch with the doctor and call you back/[facility] message with recommendations. We tolerate high sugars for a bit, so if sugars are running 180-200, this is ok. As weight starts dropping the blood sugars should too. If readings are consistently over 200 for 1-2 weeks, then you should call the doctor/nurse.    Types of GLP-1 medications include:    Victoza: Starting dose is 0.6 mg for a week, then 1.2 mg for a week, and then 1.8 mg thereafter (if prescribed by doctor). This medication is given daily. Pen needles need to be ordered also.  Ozempic: Starting dose is 0.25 mg once weekly for 4 weeks, and then increase to 0.5 mg weekly. If after 4 weeks of being on 0.5 mg weekly dosing and still wanting additional weight loss and/or blood sugar benefits, check with your  doctor to see if they want to increase the dose to 1 mg weekly. Pen needles come in the Ozempic pen box.  Trulicity: Starting dose is 0.75 mg once weekly.  If after 1-3 months of being on 0.75 mg weekly and still wanting additional weight loss and/or blood sugar benefits, check with your doctor to see if they want to increase the dose to 1.5 mg weekly.  Bydureon: Starting dose is 2 mg and is given weekly. The patient should pick one day a week and give the medication on that day. Pen needles need to be ordered also.  Byetta: Starting dose is 5 mcg twice daily. This is given for the first month. Check with the doctor after a month to see if they want the dose increased to 10 mcg BID. Pen needles need to be ordered also.     Side effects of GLP- Medications include: The most common side effects are all GI related and consist of: nausea, constipation, diarrhea, burping, or gassiness. Patients are advised to eat slowly and less, and nausea typically passes if people can stick it out.     The risk of pancreatitis (inflammation of the pancreas) has been associated with this type of medication, but is very rare.  If you have had pancreatitis in the past, this medication may not be for you. Please let us know about any past history of pancreas problems.      Symptoms of pancreatitis include: Pain in your upper stomach area which  may travel to your back and be worse after eating. Your stomach area may be tender to the touch.  You may have vomiting or nausea and/or have a fever. If you should develop any of these symptoms, stop the medication and contact your primary care doctor. They will do a blood test to check for pancreatitis.         There is a small chance you may have some low blood sugar after taking the medication.   The signs of low blood sugar are:  o Weakness  o Shaky   o Hungry  o Sweating  o Confusion      See below for ways to treat low blood sugar without adding in lots of extra calories.      Treating Low  Blood Sugar    If you have symptoms of low blood sugar (sweating, shaking, dizzy, confused) eat 15 grams of carbs and wait 15 minutes:      Glucose Tabs are best for sugars under 70 -  Dex4 or BD Glucose tablets are good, you will need to take 3-4 of these to equal 15 grams.       One small box of raisins    4 oz fruit juice box or   cup fruit juice    1 small apple    1 small banana      cup canned fruit in water      English muffin or a slice of bread with jelly     1 low fat frozen waffle with sugar-free syrup      cup cottage cheese with   cup frozen or fresh blueberries    1 cup skim or low-fat milk      cup whole grain cereal    4-6 crackers such as Triscuits      This medication is usually covered by insurance for patients with a diagnosis of Type 2 Diabetes. Depending on insurance coverage, the medication may be changed to a different formulary, but they all work in the same way. Sometimes a prior authorization is required, which may take up to 1-2 weeks for an insurance company to make a decision if they will cover the medication. Please be patient, you will be notified either way.     For any questions/concerns contact Carmina Cavazos LPN at [phone]     (Do not stop taking it if you don't think it's working. For some people it works without them knowing it.)     In order to get refills of this or any medication we prescribe you must be seen in the medical weight mgmt clinic every 2-4 months. Please have your pharmacy fax a refill request to [phone].

## 2021-10-05 NOTE — PROGRESS NOTES
Babita is a 36 year old who is being evaluated via a billable video visit.      How would you like to obtain your AVS? MyChart  If the video visit is dropped, the invitation should be resent by: Text to cell phone: [phone]  Will anyone else be joining your video visit? No      Video Start Time: 12:00 PM  Video-Visit Details    Type of service:  Video Visit    Video End Time:12:17 PM    Originating Location (pt. Location): Home    Distant Location (provider location):  [facility]NA[facility]     Platform used for Video Visit: [employer]      Pre-Bariatric Surgery Note    Elizabeth Pascual    Date: 10/5/2021     RE: Babita Rivas    MR#: [id number]   : 1985   Date of Visit: Oct 5, 2021    REASON FOR VISIT: Preoperative evaluation for possible weight loss surgery    Dear Elizabeth Gurrola,    I had the pleasure of seeing your patient, Babita Rivas, in my preoperative bariatric clinic.    As you know, she is morbidly obese and considering weight loss surgery to treat obesity in association with her medical conditions of obesity.  Her consult weight was 290.  She has lost 0 pounds since her consult weight. She has not met her required pre-surgery weight. Please refer to initial consult note from date 2021 for patient's weight history and co-morbidities.    Last seen 2021- started topiramate- cravings hunger- helped with appetite, decreased intake   Labs 2021- new DMII, started metformin - tolerating okay, gets sick if she doesn't take them, increased diarrhea   Weight with  2021   Started working with therapist 2021- multiple deaths in family, depression     Not meal planning   Irregular meal times       Assessment & Plan   Problem List Items Addressed This Visit        Digestive    Class 3 severe obesity due to excess calories with serious comorbidity and body mass index (BMI) of 50.0 to 59.9 in adult (H) - Primary     Per chart review via care  everywhere, weight gain since last seen. Significant stressors as of recent. Tolerating metformin and topiramate. Feels topiramate has helped with decreased appetite. Discussed glp1 for DMII which would also help with weight loss. Patient is unsure about this because of medication burden. Could stop both metformin when starting glp1. Patient will consider and get back to me. Continues to work with dietitian. 20lb from weight loss goal for surgery.                 Review of external notes as documented above     30 minutes spent on the date of the encounter doing chart review, history and exam, documentation and further activities per the note  [id number]}  MEDICATIONS:        - Continue other medications without change       - consider ozempic for DMII and weight loss     FUTURE APPOINTMENTS:       - Follow-up visit in 3 months     Reviewed tasklist with patient: - psych consult in process, needs weight loss     Most recent weights:  Wt Readings from Last 4 Encounters:   10/05/21 131.5 kg (290 lb)   21 133.8 kg (295 lb)   21 131.5 kg (290 lb)   21 133.8 kg (295 lb)         ROS    No past medical history on file.    Past Surgical History:   Procedure Laterality Date      SECTION      x 2     TUBAL LIGATION         Current Outpatient Medications   Medication     hydrOXYzine (ATARAX) 10 MG tablet     metFORMIN (GLUCOPHAGE-XR) 500 MG 24 hr tablet     sertraline (ZOLOFT) 25 MG tablet     topiramate (TOPAMAX) 25 MG tablet     vitamin D3 (CHOLECALCIFEROL) 1.25 MG (09072 UT) capsule     No current facility-administered medications for this visit.       No Known Allergies    Orders Only on 2021   Component Date Value Ref Range Status     TSH 2021 0.86  0.40 - 4.00 mU/L Final     Vitamin D Deficiency screening 2021 9* 20 - 75 ug/L Final    Comment: Season, race, dietary intake, and treatment affect the concentration of   25-hydroxy-Vitamin D. Values may decrease during winter months and  increase   during summer months. Values 20-29 ug/L may indicate Vitamin D insufficiency   and values <20 ug/L may indicate Vitamin D deficiency.  Vitamin D determination is routinely performed by an immunoassay specific for   25 hydroxyvitamin D3.  If an individual is on vitamin D2 (ergocalciferol)   supplementation, please specify 25 OH vitamin D2 and D3 level determination by   LCMSMS test [id number].       Parathyroid Hormone Intact 05/11/2021 34  18 - 80 pg/mL Final     Cholesterol 05/11/2021 186  <200 mg/dL Final     Triglycerides 05/11/2021 257* <150 mg/dL Final    Comment: Borderline high:  150-199 mg/dl  High:             200-499 mg/dl  Very high:       >499 mg/dl  Non Fasting       HDL Cholesterol 05/11/2021 37* >49 mg/dL Final     LDL Cholesterol Calculated 05/11/2021 98  <100 mg/dL Final    Desirable:       <100 mg/dl     Non HDL Cholesterol 05/11/2021 149* <130 mg/dL Final    Comment: Above Desirable:  130-159 mg/dl  Borderline high:  160-189 mg/dl  High:             190-219 mg/dl  Very high:       >219 mg/dl       Hemoglobin A1C 05/11/2021 6.5* 0 - 5.6 % Final    Comment: Normal <5.7% Prediabetes 5.7-6.4%  Diabetes 6.5% or higher - adopted from ADA   consensus guidelines.       Sodium 05/11/2021 140  133 - 144 mmol/L Final     Potassium 05/11/2021 3.9  3.4 - 5.3 mmol/L Final     Chloride 05/11/2021 109  94 - 109 mmol/L Final     Carbon Dioxide 05/11/2021 23  20 - 32 mmol/L Final     Anion Gap 05/11/2021 8  3 - 14 mmol/L Final     Glucose 05/11/2021 126* 70 - 99 mg/dL Final    Non Fasting     Urea Nitrogen 05/11/2021 10  7 - 30 mg/dL Final     Creatinine 05/11/2021 1.00  0.52 - 1.04 mg/dL Final     GFR Estimate 05/11/2021 72  >60 mL/min/[1.73_m2] Final    Comment: Non  GFR Calc  Starting 12/18/2018, serum creatinine based estimated GFR (eGFR) will be   calculated using the Chronic Kidney Disease Epidemiology Collaboration   (CKD-EPI) equation.       GFR Estimate If Black 05/11/2021 84  >60  "mL/min/[1.73_m2] Final    Comment:  GFR Calc  Starting 12/18/2018, serum creatinine based estimated GFR (eGFR) will be   calculated using the Chronic Kidney Disease Epidemiology Collaboration   (CKD-EPI) equation.       Calcium 05/11/2021 8.7  8.5 - 10.1 mg/dL Final     Bilirubin Total 05/11/2021 0.5  0.2 - 1.3 mg/dL Final     Albumin 05/11/2021 3.8  3.4 - 5.0 g/dL Final     Protein Total 05/11/2021 7.5  6.8 - 8.8 g/dL Final     Alkaline Phosphatase 05/11/2021 69  40 - 150 U/L Final     ALT 05/11/2021 63* 0 - 50 U/L Final     AST 05/11/2021 30  0 - 45 U/L Final     WBC 05/11/2021 13.0* 4.0 - 11.0 10e9/L Final     RBC Count 05/11/2021 4.89  3.8 - 5.2 10e12/L Final     Hemoglobin 05/11/2021 14.1  11.7 - 15.7 g/dL Final     Hematocrit 05/11/2021 43.9  35.0 - 47.0 % Final     MCV 05/11/2021 90  78 - 100 fl Final     MCH 05/11/2021 28.8  26.5 - 33.0 pg Final     MCHC 05/11/2021 32.1  31.5 - 36.5 g/dL Final     RDW 05/11/2021 12.4  10.0 - 15.0 % Final     Platelet Count 05/11/2021 284  150 - 450 10e9/L Final       PHYSICAL EXAM:  Objective    Ht 1.6 m (5' 3\")   Wt 131.5 kg (290 lb)   BMI 51.37 kg/m    Vitals - Patient Reported  Pain Score: Moderate Pain (5)  Pain Loc: Chest        Physical Exam   GENERAL: Healthy, alert and no distress  EYES: Eyes grossly normal to inspection.  No discharge or erythema, or obvious scleral/conjunctival abnormalities.  RESP: No audible wheeze, cough, or visible cyanosis.  No visible retractions or increased work of breathing.    SKIN: Visible skin clear. No significant rash, abnormal pigmentation or lesions.  NEURO: Cranial nerves grossly intact.  Mentation and speech appropriate for age.  PSYCH: Mentation appears normal, affect normal/bright, judgement and insight intact, normal speech and appearance well-groomed.    Sleeve Gastrectomy: Risks and Side Effects    The complications or risks of surgery include but are not limited to: death, heart attack, infection in the " surgical site (wound infection), abdomen (abscess), bladder (urinary tract infection), lungs (pneumonia), clots in legs (deep vein thrombosis) or lungs (pulmonary emboli),  injury to the bowels or other organs, bowel obstruction, hernia at the incision and gastrointestional bleeding.    More specific risks related to vertical sleeve gastrectomy were detailed at the bariatric informational seminar and include the following: leak at the vertical sleeve staple line, leak at the anastomoses,  nausea, vomiting, and dehydration for several months,  adhesions causing bowel obstruction, rapid weight loss causing a higher rate of gallstone formation during the first 6 months after surgery, decreased absorption of vitamins and protein because of the reduced stomach size, weight regain if inappropriate food intake occurs, stricture, injury to other organs, hernia,  and ulcers.       Side effects of bariatric surgery include but are not limited to: abdominal pain, cramping, bloating, constipation, nausea, vomiting, diarrhea, difficulty swallowing,  dehydration, hair loss, excess skin, protein, iron and vitamin deficiencies, heartburn, transfer of addictions, increased anxiety and worsening depression.       I emphasized exercise and activity behavior along with appropriate food choice as the main foundation for weight loss with surgery providing surgical reinforcement of the appropriate behavior set.        Review of general surgery weight loss process    1. Complete preoperative requirements, including weight loss.  Final weight check to confirm MANDATORY weight loss requirement must be documented on a clinic scale.    2. Discuss prior authorization with .    3. History and physical evaluation by PCP of PAC clinic within 30 days of surgery date, preoperative class, and weight check (weigh-in visit) to be scheduled by patient.  Pre-anesthesia clinic for risk evaluation to be scheduled by anesthesia  clinic.    4. We cannot guarantee that patient will qualify for surgery unless all preoperative requirements are met, prior authorization from primary insurance company is granted, and insurance changes do not occur.    5. It is possible for patients to regain all weight after weight loss surgery unless they follow guidelines prescribed by our bariatric center.    6. All patients with gastrointestinal complaints after weight loss surgery must have complaints conveyed to the bariatric team for appropriate treatment.    7. Vitamin deficiencies may develop post-bariatric surgery and annual laboratory testing should be performed.    8. Persistent nausea/vomiting after bariatric surgery entails risk of thiamine deficiency and should be treated early.  Vitamin B12 deficiency may develop, especially after gastric bypass surgery and must be recognized.        If you have any questions about our plans please don't hesitate to contact me.    Sincerely,    Arlette Corbett NP      Bariatric Task List    Fax:  Please fax all paperwork to:   -     Status:  Is patient a candidate for bariatric surgery?:  patient is a candidate for bariatric surgery -     Cleared to schedule surgeon consult?:  cleared to schedule surgeon consult - 4/29/2021 appointment   Status:  surgery evaluation in process -     Surgeon: Nanette  -     Tentative surgery month/year: May or June 2021, 3-4 weeks after tasks are done.  -        Insurance: Insurance:  [facility]  -      Contact insurance to discuss coverage:   -       [employer]: PCP Recommendation and Medical Clearance:    -     HP Referral:    -      Advanced beneficiary notification (ABN) for Medicare patients for RD visits   and surgery:   -      Weight history:   -     Other:    -        Patient Info: Initial Weight:  290 -     Date of Initial Weight/Height:  2/16/2021 -     Goal Weight (lbs):  280 - encouraged more    Required Weight Loss:  10 -     Surgery Type:  sleeve gastrectomy -     Multidisciplinary  Meeting:    -        Dietician Visits: Structured weight loss required by insurance?:  structured weight loss required -     Dietician Visit 1:  Completed - 2/16/21. bks   Dietician Visit 2:  Completed - 3/17/21 appt.; 3/24/21 KK   Dietician Visit 3:  Completed - 4/20/21 appt. ; 4/21/21 KK   Dietician Visit 4:  Completed - 8/3/21 LP   Dietician Visit 5:    -     Dietician Visit 6:    -     Dietician Visit additional:  Needed - Monthly if needed to reach pre-surgery goal weight or for postop diet teaching. bks   Clearance from dietician to see surgeon?:    -     Dietician Notes:    -        Psychological Evaluation: Psych eval:  Needed - completed, waiting on results as of 9/30 - LP   Therapist letter of support:    -     Psychiatrist letter of support:    -     Establish care with therapist:    -     Complete eating disorder evaluation:    -     Letter of clearance from therapist/eating disorder program:    -     Other:    -        Lab Work: Complete Blood Count:  Completed - Ordered 2/16/21 - AS; Complete 5/11/21 AS   Comprehensive Metabolic Panel:  Completed - Ordered 2/16/21 - AS; Complete 5/11/21 AS   Vitamin D:  Completed - Complete 5/11/21 AS   PTH:  Completed - Ordered 2/16/21 - AS; Complete 5/11/21 AS   Hgb A1c:  Completed - Ordered 2/16/21 - AS; Complete 5/11/21 AS    Lipids:   -      TSH ([facility], [facility], [place] MA):   -       Ferritin:   -       Folate:   -       Testosterone, Total and Free:   -     Thiamine:   -     Vitamin A:   -     Vitamin B12:   -     Zinc:   -     C-peptide:   -     H. pylori:    -     MRSA (2 swabs, minimum 48 hours apart):   -     Nicotine Testing:    -     Recheck Vitamin D:   -     Other:  Completed - lipids Ordered 2/16/21 - AS; Complete 5/11/21 AS      Consults/ Clearance Sleep Medicine:    -     Cardiac:    -     Pain:   -     Dental:    -     Endocrine:    -     Gastroenterology:    -     Vascular Medicine:    -     Hematology:    -     Medical Weight Management:   -     Physical  "Therapy/Exercise:    -     Nephrology:    -     Neurology:    -     Pulmonology:    -     Rheumatology:    -     Other:    -     Other:    -     Other:    -        Testing: UGI:    -     EGD:    -     Sleep Study:   -     Other:   -     Other:    -        PCP: Establish care with PCP:  Completed - Elizabeth Minayapatricio is PCP per pt    Follow up with PCP:    -     PCP letter of support:  Completed - done 7/15/21, sent via [employer]      Stopping Smoking/ Alcohol Use: Quit tobacco use (3 months smoke free)?:    -     Quit date:    -     Quit alcohol use:   -     Quit date:   -     Other:   -     Quit date:   -        Patient Education:  Information Session:  Completed - 2/12/21. bks   Given \"Making your decision\" handout?:    -     Given \"A Roadmap to you Weight Loss Surgery\" handout?:   -     Given \"Get Well Loop\" information?:   -     Given support group information?:  support group contact information provided - 2/23/21   Attended support group?:    -     Support plan in place?:    -     Research consents signed?:    -     Avoid NSAIDS/ Alternate Plan for Pain:   -        Additional Surgery Requirements: Review Coag plan:    -     HgA1c <8:    -     Inpatient pain consult:    -     Final nicotine screen:    -     Dental work complete:    -     Birth control plan:    -     Gallstone prevention plan (Actigall for 6 months postop):   -     Other:   -     Other:   -        Final Tasks:  Before surgery online class:  Needed -     Before surgery online class website link:  [email or web address]   After surgery online class:  Needed -     After surgery online class website link:  [email or web address]   Nurse visit per clinic:  Needed -     History and Physical per clinic:   -     Final labs per clinic: Needed -     Chest xray per clinic:   -     Electrocardiogram (ECG) per clinic:   -     Other:   -        Notes: Please register for the Get Well Loop when you get an email invitation and a " surgery date.     The Get Well Loop will give you information via email or text messages that can help you be more successful before and after surgery.  It can also help ans,wer any questions you may have.    meevl Information  [email or web address]     -

## 2021-10-05 NOTE — NURSING NOTE
"Chief Complaint   Patient presents with     RECHECK     Follow up mwm.        Vitals:    10/05/21 1119   Weight: 131.5 kg (290 lb)   Height: 1.6 m (5' 3\")       Body mass index is 51.37 kg/m .                          Modesta Chao, EMT    "

## 2021-10-05 NOTE — LETTER
10/5/2021       RE: Babita Rivas  [address]  [facility] [address]     Dear Colleague,    Thank you for referring your patient, Babita Rivas, to the [facility] [place] at [facility]. Please see a copy of my visit note below.    Babita is a 36 year old who is being evaluated via a billable video visit.      How would you like to obtain your AVS? MyChart  If the video visit is dropped, the invitation should be resent by: Text to cell phone: [phone]  Will anyone else be joining your video visit? No      Video Start Time: 12:00 PM  Video-Visit Details    Type of service:  Video Visit    Video End Time:12:17 PM    Originating Location (pt. Location): Home    Distant Location (provider location):  [facility] [place]     Platform used for Video Visit: Waqas      Pre-Bariatric Surgery Note    Elizabeth Pascual    Date: 10/5/2021     RE: Babita Rivas    MR#: [id number]   : 1985   Date of Visit: Oct 5, 2021    REASON FOR VISIT: Preoperative evaluation for possible weight loss surgery    Dear Elizabeth uGrrola,    I had the pleasure of seeing your patient, Babita Rivas, in my preoperative bariatric clinic.    As you know, she is morbidly obese and considering weight loss surgery to treat obesity in association with her medical conditions of obesity.  Her consult weight was 290.  She has lost 0 pounds since her consult weight. She has not met her required pre-surgery weight. Please refer to initial consult note from date 2021 for patient's weight history and co-morbidities.    Last seen 2021- started topiramate- cravings hunger- helped with appetite, decreased intake   Labs 2021- new DMII, started metformin - tolerating okay, gets sick if she doesn't take them, increased diarrhea   Weight with  2021   Started working with therapist 2021-  multiple deaths in family, depression     Not meal planning   Irregular meal times       Assessment & Plan   Problem List Items Addressed This Visit        Digestive    Class 3 severe obesity due to excess calories with serious comorbidity and body mass index (BMI) of 50.0 to 59.9 in adult (H) - Primary     Per chart review via care everywhere, weight gain since last seen. Significant stressors as of recent. Tolerating metformin and topiramate. Feels topiramate has helped with decreased appetite. Discussed glp1 for DMII which would also help with weight loss. Patient is unsure about this because of medication burden. Could stop both metformin when starting glp1. Patient will consider and get back to me. Continues to work with dietitian. 20lb from weight loss goal for surgery.                 Review of external notes as documented above     30 minutes spent on the date of the encounter doing chart review, history and exam, documentation and further activities per the note  0956}  MEDICATIONS:        - Continue other medications without change       - consider ozempic for DMII and weight loss     FUTURE APPOINTMENTS:       - Follow-up visit in 3 months     Reviewed tasklist with patient: - psych consult in process, needs weight loss     Most recent weights:  Wt Readings from Last 4 Encounters:   10/05/21 131.5 kg (290 lb)   21 133.8 kg (295 lb)   21 131.5 kg (290 lb)   21 133.8 kg (295 lb)         ROS    No past medical history on file.    Past Surgical History:   Procedure Laterality Date      SECTION      x 2     TUBAL LIGATION         Current Outpatient Medications   Medication     hydrOXYzine (ATARAX) 10 MG tablet     metFORMIN (GLUCOPHAGE-XR) 500 MG 24 hr tablet     sertraline (ZOLOFT) 25 MG tablet     topiramate (TOPAMAX) 25 MG tablet     vitamin D3 (CHOLECALCIFEROL) 1.25 MG (73825 UT) capsule     No current facility-administered medications for this visit.       No Known  Allergies    Orders Only on 05/11/2021   Component Date Value Ref Range Status     TSH 05/11/2021 0.86  0.40 - 4.00 mU/L Final     Vitamin D Deficiency screening 05/11/2021 9* 20 - 75 ug/L Final    Comment: Season, race, dietary intake, and treatment affect the concentration of   25-hydroxy-Vitamin D. Values may decrease during winter months and increase   during summer months. Values 20-29 ug/L may indicate Vitamin D insufficiency   and values <20 ug/L may indicate Vitamin D deficiency.  Vitamin D determination is routinely performed by an immunoassay specific for   25 hydroxyvitamin D3.  If an individual is on vitamin D2 (ergocalciferol)   supplementation, please specify 25 OH vitamin D2 and D3 level determination by   LCMSMS test VITD23.       Parathyroid Hormone Intact 05/11/2021 34  18 - 80 pg/mL Final     Cholesterol 05/11/2021 186  <200 mg/dL Final     Triglycerides 05/11/2021 257* <150 mg/dL Final    Comment: Borderline high:  150-199 mg/dl  High:             200-499 mg/dl  Very high:       >499 mg/dl  Non Fasting       HDL Cholesterol 05/11/2021 37* >49 mg/dL Final     LDL Cholesterol Calculated 05/11/2021 98  <100 mg/dL Final    Desirable:       <100 mg/dl     Non HDL Cholesterol 05/11/2021 149* <130 mg/dL Final    Comment: Above Desirable:  130-159 mg/dl  Borderline high:  160-189 mg/dl  High:             190-219 mg/dl  Very high:       >219 mg/dl       Hemoglobin A1C 05/11/2021 6.5* 0 - 5.6 % Final    Comment: Normal <5.7% Prediabetes 5.7-6.4%  Diabetes 6.5% or higher - adopted from ADA   consensus guidelines.       Sodium 05/11/2021 140  133 - 144 mmol/L Final     Potassium 05/11/2021 3.9  3.4 - 5.3 mmol/L Final     Chloride 05/11/2021 109  94 - 109 mmol/L Final     Carbon Dioxide 05/11/2021 23  20 - 32 mmol/L Final     Anion Gap 05/11/2021 8  3 - 14 mmol/L Final     Glucose 05/11/2021 126* 70 - 99 mg/dL Final    Non Fasting     Urea Nitrogen 05/11/2021 10  7 - 30 mg/dL Final     Creatinine 05/11/2021  "1.00  0.52 - 1.04 mg/dL Final     GFR Estimate 05/11/2021 72  >60 mL/min/[1.73_m2] Final    Comment: Non  GFR Calc  Starting 12/18/2018, serum creatinine based estimated GFR (eGFR) will be   calculated using the Chronic Kidney Disease Epidemiology Collaboration   (CKD-EPI) equation.       GFR Estimate If Black 05/11/2021 84  >60 mL/min/[1.73_m2] Final    Comment:  GFR Calc  Starting 12/18/2018, serum creatinine based estimated GFR (eGFR) will be   calculated using the Chronic Kidney Disease Epidemiology Collaboration   (CKD-EPI) equation.       Calcium 05/11/2021 8.7  8.5 - 10.1 mg/dL Final     Bilirubin Total 05/11/2021 0.5  0.2 - 1.3 mg/dL Final     Albumin 05/11/2021 3.8  3.4 - 5.0 g/dL Final     Protein Total 05/11/2021 7.5  6.8 - 8.8 g/dL Final     Alkaline Phosphatase 05/11/2021 69  40 - 150 U/L Final     ALT 05/11/2021 63* 0 - 50 U/L Final     AST 05/11/2021 30  0 - 45 U/L Final     WBC 05/11/2021 13.0* 4.0 - 11.0 10e9/L Final     RBC Count 05/11/2021 4.89  3.8 - 5.2 10e12/L Final     Hemoglobin 05/11/2021 14.1  11.7 - 15.7 g/dL Final     Hematocrit 05/11/2021 43.9  35.0 - 47.0 % Final     MCV 05/11/2021 90  78 - 100 fl Final     MCH 05/11/2021 28.8  26.5 - 33.0 pg Final     MCHC 05/11/2021 32.1  31.5 - 36.5 g/dL Final     RDW 05/11/2021 12.4  10.0 - 15.0 % Final     Platelet Count 05/11/2021 284  150 - 450 10e9/L Final       PHYSICAL EXAM:  Objective    Ht 1.6 m (5' 3\")   Wt 131.5 kg (290 lb)   BMI 51.37 kg/m    Vitals - Patient Reported  Pain Score: Moderate Pain (5)  Pain Loc: Chest        Physical Exam   GENERAL: Healthy, alert and no distress  EYES: Eyes grossly normal to inspection.  No discharge or erythema, or obvious scleral/conjunctival abnormalities.  RESP: No audible wheeze, cough, or visible cyanosis.  No visible retractions or increased work of breathing.    SKIN: Visible skin clear. No significant rash, abnormal pigmentation or lesions.  NEURO: Cranial nerves " grossly intact.  Mentation and speech appropriate for age.  PSYCH: Mentation appears normal, affect normal/bright, judgement and insight intact, normal speech and appearance well-groomed.    Sleeve Gastrectomy: Risks and Side Effects    The complications or risks of surgery include but are not limited to: death, heart attack, infection in the surgical site (wound infection), abdomen (abscess), bladder (urinary tract infection), lungs (pneumonia), clots in legs (deep vein thrombosis) or lungs (pulmonary emboli),  injury to the bowels or other organs, bowel obstruction, hernia at the incision and gastrointestional bleeding.    More specific risks related to vertical sleeve gastrectomy were detailed at the bariatric informational seminar and include the following: leak at the vertical sleeve staple line, leak at the anastomoses,  nausea, vomiting, and dehydration for several months,  adhesions causing bowel obstruction, rapid weight loss causing a higher rate of gallstone formation during the first 6 months after surgery, decreased absorption of vitamins and protein because of the reduced stomach size, weight regain if inappropriate food intake occurs, stricture, injury to other organs, hernia,  and ulcers.       Side effects of bariatric surgery include but are not limited to: abdominal pain, cramping, bloating, constipation, nausea, vomiting, diarrhea, difficulty swallowing,  dehydration, hair loss, excess skin, protein, iron and vitamin deficiencies, heartburn, transfer of addictions, increased anxiety and worsening depression.       I emphasized exercise and activity behavior along with appropriate food choice as the main foundation for weight loss with surgery providing surgical reinforcement of the appropriate behavior set.        Review of general surgery weight loss process    1. Complete preoperative requirements, including weight loss.  Final weight check to confirm MANDATORY weight loss requirement must be  documented on a clinic scale.    2. Discuss prior authorization with .    3. History and physical evaluation by PCP of PAC clinic within 30 days of surgery date, preoperative class, and weight check (weigh-in visit) to be scheduled by patient.  Pre-anesthesia clinic for risk evaluation to be scheduled by anesthesia clinic.    4. We cannot guarantee that patient will qualify for surgery unless all preoperative requirements are met, prior authorization from primary insurance company is granted, and insurance changes do not occur.    5. It is possible for patients to regain all weight after weight loss surgery unless they follow guidelines prescribed by our bariatric center.    6. All patients with gastrointestinal complaints after weight loss surgery must have complaints conveyed to the bariatric team for appropriate treatment.    7. Vitamin deficiencies may develop post-bariatric surgery and annual laboratory testing should be performed.    8. Persistent nausea/vomiting after bariatric surgery entails risk of thiamine deficiency and should be treated early.  Vitamin B12 deficiency may develop, especially after gastric bypass surgery and must be recognized.        If you have any questions about our plans please don't hesitate to contact me.    Sincerely,    Arlette Corbett NP      Bariatric Task List    Fax:  Please fax all paperwork to:   -     Status:  Is patient a candidate for bariatric surgery?:  patient is a candidate for bariatric surgery -     Cleared to schedule surgeon consult?:  cleared to schedule surgeon consult - 4/29/2021 appointment   Status:  surgery evaluation in process -     Surgeon: Nanette  -     Tentative surgery month/year: May or June 2021, 3-4 weeks after tasks are done.  -        Insurance: Insurance:  [facility]  -      Contact insurance to discuss coverage:   -       [employer]: PCP Recommendation and Medical Clearance:    -     HP Referral:    -      Advanced beneficiary  notification (ABN) for Medicare patients for RD visits   and surgery:   -      Weight history:   -     Other:    -        Patient Info: Initial Weight:  290 -     Date of Initial Weight/Height:  2/16/2021 -     Goal Weight (lbs):  280 - encouraged more    Required Weight Loss:  10 -     Surgery Type:  sleeve gastrectomy -     Multidisciplinary Meeting:    -        Dietician Visits: Structured weight loss required by insurance?:  structured weight loss required -     Dietician Visit 1:  Completed - 2/16/21. bks   Dietician Visit 2:  Completed - 3/17/21 appt.; 3/24/21 KK   Dietician Visit 3:  Completed - 4/20/21 appt. ; 4/21/21 KK   Dietician Visit 4:  Completed - 8/3/21 LP   Dietician Visit 5:    -     Dietician Visit 6:    -     Dietician Visit additional:  Needed - Monthly if needed to reach pre-surgery goal weight or for postop diet teaching. bks   Clearance from dietician to see surgeon?:    -     Dietician Notes:    -        Psychological Evaluation: Psych eval:  Needed - completed, waiting on results as of 9/30 - LP   Therapist letter of support:    -     Psychiatrist letter of support:    -     Establish care with therapist:    -     Complete eating disorder evaluation:    -     Letter of clearance from therapist/eating disorder program:    -     Other:    -        Lab Work: Complete Blood Count:  Completed - Ordered 2/16/21 - AS; Complete 5/11/21 AS   Comprehensive Metabolic Panel:  Completed - Ordered 2/16/21 - AS; Complete 5/11/21 AS   Vitamin D:  Completed - Complete 5/11/21 AS   PTH:  Completed - Ordered 2/16/21 - AS; Complete 5/11/21 AS   Hgb A1c:  Completed - Ordered 2/16/21 - AS; Complete 5/11/21 AS    Lipids:   -      TSH (UCARE, [facility], [place] MA):   -       Ferritin:   -       Folate:   -       Testosterone, Total and Free:   -     Thiamine:   -     Vitamin A:   -     Vitamin B12:   -     Zinc:   -     C-peptide:   -     H. pylori:    -     MRSA (2 swabs, minimum 48 hours apart):   -     Nicotine Testing:   "  -     Recheck Vitamin D:   -     Other:  Completed - lipids Ordered 2/16/21 - AS; Complete 5/11/21 AS      Consults/ Clearance Sleep Medicine:    -     Cardiac:    -     Pain:   -     Dental:    -     Endocrine:    -     Gastroenterology:    -     Vascular Medicine:    -     Hematology:    -     Medical Weight Management:   -     Physical Therapy/Exercise:    -     Nephrology:    -     Neurology:    -     Pulmonology:    -     Rheumatology:    -     Other:    -     Other:    -     Other:    -        Testing: UGI:    -     EGD:    -     Sleep Study:   -     Other:   -     Other:    -        PCP: Establish care with PCP:  Completed - Elizabeth Minayapatricio is PCP per pt    Follow up with PCP:    -     PCP letter of support:  Completed - done 7/15/21, sent via [employer]      Stopping Smoking/ Alcohol Use: Quit tobacco use (3 months smoke free)?:    -     Quit date:    -     Quit alcohol use:   -     Quit date:   -     Other:   -     Quit date:   -        Patient Education:  Information Session:  Completed - 2/12/21. bks   Given \"Making your decision\" handout?:    -     Given \"A Roadmap to you Weight Loss Surgery\" handout?:   -     Given \"Get Well Loop\" information?:   -     Given support group information?:  support group contact information provided - 2/23/21   Attended support group?:    -     Support plan in place?:    -     Research consents signed?:    -     Avoid NSAIDS/ Alternate Plan for Pain:   -        Additional Surgery Requirements: Review Coag plan:    -     HgA1c <8:    -     Inpatient pain consult:    -     Final nicotine screen:    -     Dental work complete:    -     Birth control plan:    -     Gallstone prevention plan (Actigall for 6 months postop):   -     Other:   -     Other:   -        Final Tasks:  Before surgery online class:  Needed -     Before surgery online class website link:  [email or web address]   After surgery online class:  Needed -     After surgery online class " website link:  [email or web address]   Nurse visit per clinic:  Needed -     History and Physical per clinic:   -     Final labs per clinic: Needed -     Chest xray per clinic:   -     Electrocardiogram (ECG) per clinic:   -     Other:   -        Notes: Please register for the Get Well Loop when you get an email invitation and a surgery date.     The Get Well Loop will give you information via email or text messages that can help you be more successful before and after surgery.  It can also help ans,wer any questions you may have.    Get Well Loop Information  [email or web address]     -

## 2021-10-05 NOTE — ASSESSMENT & PLAN NOTE
Per chart review via care everywhere, weight gain since last seen. Significant stressors as of recent. Tolerating metformin and topiramate. Feels topiramate has helped with decreased appetite. Discussed glp1 for DMII which would also help with weight loss. Patient is unsure about this because of medication burden. Could stop both metformin when starting glp1. Patient will consider and get back to me. Continues to work with dietitian. 20lb from weight loss goal for surgery.

## 2021-10-05 NOTE — Clinical Note
Hey! Met with brina today. She was upset because I told her that she couldn't likely lose 20lb before December and that I thought December was full for surgery. Her primary care appointment 9/30 looks like it ws in person and she weighted 300lb at that time. I told her I'd message and see if Haley or Dillon could talk to her about scheduling. She had psych done last week. We discussed ozempic for additional weight loss but was frustrated and didn't want to talk anymore.

## 2021-10-11 ENCOUNTER — HEALTH MAINTENANCE LETTER (OUTPATIENT)
Age: 36
End: 2021-10-11

## 2021-10-26 ENCOUNTER — TELEPHONE (OUTPATIENT)
Dept: ENDOCRINOLOGY | Facility: CLINIC | Age: 36
End: 2021-10-26

## 2021-10-26 NOTE — TELEPHONE ENCOUNTER
Central Prior Authorization Team   Phone: [phone]      PA Initiation    Medication: Ozempic-PA initiated  Insurance Company: [employer] - Phone [phone] Fax [phone]  Pharmacy Filling the Rx: [facility] #[id number] [address] [facility] [address] [facility]  Filling Pharmacy Phone: [phone]  Filling Pharmacy Fax:    Start Date: 10/26/2021

## 2021-10-26 NOTE — TELEPHONE ENCOUNTER
"Prior Authorization Retail Medication Request    Medication/Dose: Ozempic  ICD code (if different than what is on RX):    Previously Tried and Failed: nutrition counseling, Watching portions and calories, exercise, Slimfast     Rationale: Type II diabetes, depression, asthma, back pain   Patient qualifies for use of weight loss medication(s) because they have a BMI of at least 30 kg/m^2.     Estimated body mass index is 51.37 kg/m  as calculated from the following:    Height as of 10/5/21: 1.6 m (5' 3\").    Weight as of 10/5/21: 131.5 kg (290 lb).      Insurance Name:    Insurance ID:        Pharmacy Information (if different than what is on RX)  Name: [id number] [address] [facility] AT [address] [address] [place]  Phone: [phone]    "

## 2021-10-27 ENCOUNTER — MYC MEDICAL ADVICE (OUTPATIENT)
Dept: PHARMACY | Facility: CLINIC | Age: 36
End: 2021-10-27

## 2021-10-27 ENCOUNTER — TELEPHONE (OUTPATIENT)
Dept: ENDOCRINOLOGY | Facility: CLINIC | Age: 36
End: 2021-10-27

## 2021-10-27 DIAGNOSIS — E11.9 TYPE 2 DIABETES MELLITUS WITHOUT COMPLICATION, WITHOUT LONG-TERM CURRENT USE OF INSULIN (H): Primary | ICD-10-CM

## 2021-10-27 NOTE — TELEPHONE ENCOUNTER
Called pt in regard to 11:00 am appointment as pt was not on video.    Pt reports she is in a class and unable to meet right now. Asked to be seen later today - let pt know this would not be an option.    Will need to reschedule.    Yas Dudley, BUNNY, RD, LD

## 2021-10-27 NOTE — TELEPHONE ENCOUNTER
Ozempic not covered.  However it does look that Victoza may be covered so sending message to patient to see their thoughts on starting Victoza as an alternative.    Lauren Bloch, PharmD  Medication Therapy Management Pharmacist   [facility] [place]

## 2021-10-27 NOTE — TELEPHONE ENCOUNTER
PRIOR AUTHORIZATION DENIED    Medication: Ozempic-PA denied    Denial Date: 10/27/2021    Denial Rational:         Appeal Information:

## 2021-10-28 RX ORDER — LIRAGLUTIDE 6 MG/ML
INJECTION SUBCUTANEOUS
Qty: 9 ML | Refills: 2 | Status: SHIPPED | OUTPATIENT
Start: 2021-10-28 | End: 2022-01-25

## 2021-10-28 NOTE — TELEPHONE ENCOUNTER
Ozempic not covered. Switching to Victoza. Patient was supposed to have appt today to f/up on Ozempic start, but never started. Rescheduled MTM visit to 1 month from now. Patient is okay with starting Victoza. She will look at fotobabble message for additional information on Victoza and will reach out if questions/concerns.     Lauren Bloch, PharmD  Medication Therapy Management Pharmacist   [facility] [place]

## 2021-10-29 NOTE — TELEPHONE ENCOUNTER
Will be starting Victoza. CPA with provider.     Lauren Bloch, PharmD  Medication Therapy Management Pharmacist   [facility] [place]

## 2021-11-02 ENCOUNTER — TELEPHONE (OUTPATIENT)
Dept: ENDOCRINOLOGY | Facility: CLINIC | Age: 36
End: 2021-11-02

## 2021-11-03 NOTE — TELEPHONE ENCOUNTER
Central Prior Authorization Team   Phone: [phone]      PA Initiation    Medication: liraglutide (VICTOZA PEN) 18 MG/3ML solution-PA initiated  Insurance Company:    Pharmacy Filling the Rx: [facility] #[id number] - [facility], [place] - [address] [facility] AT [address]  Filling Pharmacy Phone: [phone]  Filling Pharmacy Fax:    Start Date: 11/3/2021

## 2021-11-04 NOTE — TELEPHONE ENCOUNTER
I spent 30 minutes on the phone with rBenna at [facility]. She said this needs a PA and a quantity limit, but it turns out the pharmacy is running this for 2 pens. Brenna was very confused about the fact that I was requesting 3 pens. After 30 minutes on the call, she hung up on me. She did not call back even though she had my phone number.

## 2021-11-05 NOTE — TELEPHONE ENCOUNTER
Prior Authorization Not Needed per Insurance-Per Keisha at [facility]-pharmacy is trying to fill the 2-pk of Victoza @ 2 pks/30 days instead of the 3-pk for 30 days.    Medication: liraglutide (VICTOZA PEN) 18 MG/3ML solution-PA Not Needed  Insurance Company: [employer] - Phone [phone] Fax [phone]  Expected CoPay:      Pharmacy Filling the Rx: [id number] - [facility] [address] [facility] AT [address] [place] [address] [facility]  Pharmacy Notified: Yes-pharmacy was notified to fill for correct product/quantity  Patient Notified: No-pharmacy will contact

## 2021-11-15 ENCOUNTER — TRANSFERRED RECORDS (OUTPATIENT)
Dept: HEALTH INFORMATION MANAGEMENT | Facility: CLINIC | Age: 36
End: 2021-11-15
Payer: COMMERCIAL

## 2021-11-24 ENCOUNTER — VIRTUAL VISIT (OUTPATIENT)
Dept: PHARMACY | Facility: CLINIC | Age: 36
End: 2021-11-24
Payer: COMMERCIAL

## 2021-11-24 DIAGNOSIS — E66.01 CLASS 3 SEVERE OBESITY DUE TO EXCESS CALORIES WITH SERIOUS COMORBIDITY AND BODY MASS INDEX (BMI) OF 50.0 TO 59.9 IN ADULT (H): ICD-10-CM

## 2021-11-24 DIAGNOSIS — F33.2 SEVERE RECURRENT MAJOR DEPRESSION WITHOUT PSYCHOTIC FEATURES (H): ICD-10-CM

## 2021-11-24 DIAGNOSIS — U07.1 LAB TEST POSITIVE FOR DETECTION OF COVID-19 VIRUS: ICD-10-CM

## 2021-11-24 DIAGNOSIS — G47.00 INSOMNIA, UNSPECIFIED TYPE: ICD-10-CM

## 2021-11-24 DIAGNOSIS — E66.813 CLASS 3 SEVERE OBESITY DUE TO EXCESS CALORIES WITH SERIOUS COMORBIDITY AND BODY MASS INDEX (BMI) OF 50.0 TO 59.9 IN ADULT (H): ICD-10-CM

## 2021-11-24 DIAGNOSIS — E55.9 VITAMIN D DEFICIENCY: ICD-10-CM

## 2021-11-24 DIAGNOSIS — E11.9 TYPE 2 DIABETES MELLITUS WITHOUT COMPLICATION, WITHOUT LONG-TERM CURRENT USE OF INSULIN (H): Primary | ICD-10-CM

## 2021-11-24 PROCEDURE — 99605 MTMS BY PHARM NP 15 MIN: CPT | Performed by: PHARMACIST

## 2021-11-24 PROCEDURE — 99607 MTMS BY PHARM ADDL 15 MIN: CPT | Performed by: PHARMACIST

## 2021-11-24 RX ORDER — CHOLECALCIFEROL (VITAMIN D3) 50 MCG
1 TABLET ORAL DAILY
Status: ON HOLD | COMMUNITY
End: 2022-03-23

## 2021-11-24 NOTE — PATIENT INSTRUCTIONS
Recommendations from today's MTM visit:                                                    MTM (medication therapy management) is a service provided by a clinical pharmacist designed to help you get the most of out of your medicines.   Today we reviewed what your medicines are for, how to know if they are working, that your medicines are safe and how to make your medicine regimen as easy as possible.      1. Start Vitamin D 2000 international unit(s) daily     2. Start Victoza, see below instructions. Called pharmacy and they filled Victoza and pen needles.     Victoza Dosing:   Start Victoza: It is a subcutaneous injection that you inject once daily and titrate the dose slowly over time. Make sure inject the same time each day.   Week 1: Inject 0.6 mg once daily  Week 2: If tolerating, increase to 1.2 mg once daily   Week 3 and after: If tolerating, increase to 1.8 mg once daily   *If you are having some nausea to where you are hesitant to move up to the next dose, stay at the same dose you are on for an additional week to see if nausea improves/resolves. Make sure to take this time to hydrate and ensure you are drinking at least 64 oz water per day.      Victoza Administration Video:   Https://www.[email or web address]/watch?v=U6lv1IPFznq     Victoza Storage and Stability:   Store new, unused Victoza pens in the refrigerator. After first use, store in a refrigerator or at room temperature. Pens in use should be thrown away after 30 days even if they still have Victoza left in them.      Victoza Common Side Effects:   Nausea, diarrhea, constipation, headache, tiredness (fatigue), dizziness, stomach upset/pain. Less commonly, Victoza can cause low blood sugar (symptoms: shaky, dizzy, sweaty, agitation). It is important to ensure that you are eating consistent meals and not skipping meals. Ensure you are getting at least 64 oz water daily.      3. Schedule follow up in 1 month Arlette Corbett CNP     4. Get COVID-19 vaccine and  influenza vaccine once recovered    Follow-up: 1 month with Arlette Corbett CNP and 2 months with Lauren Bloch [facility] pharmacist - [phone]      It was great to speak with you today.  I value your experience and would be very thankful for your time with providing feedback on our clinic survey. You may receive a survey via email or text message in the next few days.       My Clinical Pharmacist's contact information:                                                      Please feel free to contact me with any questions or concerns you have.      Lauren Bloch, PharmD  Medication Therapy Management Pharmacist   [facility] [place]

## 2021-11-24 NOTE — Clinical Note
She never started victoza so now having her start this. She will follow up with you in 1month. Elvie VICTORIA

## 2021-11-24 NOTE — PROGRESS NOTES
Medication Therapy Management (MTM) Encounter    ASSESSMENT:                            Medication Adherence/Access: No issues identified    Type 2 Diabetes/Obesity: Patient is meeting A1c goal of < 7%. Patient would benefit from GLP-1 agonist (Victoza) :  Start at dose : 0.6 mg daily. Due for COVID-19 vaccine and influenza vaccine to get in future. Utilizing Victoza more for weight management than sugars, so will maximize dose. Due for annual foot exam. Due for annual eye exam. Patient may not qualify for statin at this time due to age, but can re-evaluate in future. Due to age <40 and no other cardioscular risk factors discerned, may be reasonable to hold off on statin therapy for now and focus on lifestyle changes. Would benefit from albumin/creat ratio in future.     COVID-19: Would benefit from COVID-19 vaccine after recovered from COVID-19.      Depression: Stable from subjective information. Would benefit from PHQ9 in future.     Insomnia: Stable.     Vitamin D Deficiency: Would benefit from starting daily vitamin D 2000 international unit(s) daily due to previous Vitamin D level <30 and completed high dose vitamin therapy after 2 months.     PLAN:                            1. Start Vitamin D 2000 international unit(s) daily     2. Start Victoza, see below instructions. Called pharmacy and filled Victoza and pen needles.     Victoza Dosing:   Start Victoza: It is a subcutaneous injection that you inject once daily and titrate the dose slowly over time. Make sure inject the same time each day.   Week 1: Inject 0.6 mg once daily  Week 2: If tolerating, increase to 1.2 mg once daily   Week 3 and after: If tolerating, increase to 1.8 mg once daily   *If you are having some nausea to where you are hesitant to move up to the next dose, stay at the same dose you are on for an additional week to see if nausea improves/resolves. Make sure to take this time to hydrate and ensure you are drinking at least 64 oz water  per day.      Victoza Administration Video:   Https://www.youtube.com/watch?v=F9ge9MSInhj     Victoza Storage and Stability:   Store new, unused Victoza pens in the refrigerator. After first use, store in a refrigerator or at room temperature. Pens in use should be thrown away after 30 days even if they still have Victoza left in them.      Victoza Common Side Effects:   Nausea, diarrhea, constipation, headache, tiredness (fatigue), dizziness, stomach upset/pain. Less commonly, Victoza can cause low blood sugar (symptoms: shaky, dizzy, sweaty, agitation). It is important to ensure that you are eating consistent meals and not skipping meals. Ensure you are getting at least 64 oz water daily.      3. Schedule follow up in 1 month Arlette Corbett CNP     4. Get COVID-19 vaccine and influenza vaccine once recovered    5. Future: PHQ9     Follow-up: 1 month with Arlette Corbett CNP and 2 months with Lauren Bloch MTM pharmacist     SUBJECTIVE/OBJECTIVE:                          Babita Rivas is a 36 year old female called for an initial visit. She was referred to me from Arlette Corbett CNP.      Reason for visit: Victoza follow up.    Allergies/ADRs: Reviewed in chart  Past Medical History: Reviewed in chart  Tobacco: She reports that she has never smoked. She has never used smokeless tobacco.  Alcohol: Less than 1 beverages / week      Medication Adherence/Access: no issues reported    Type 2 Diabetes/Obesity:   Victoza 0.6 mg daily   Metformin  mg tablet: 2 tablets once daily     Seen by Arlette Corbett CNP on 10/5/2021 for Pre-Bariatric Surgery Consult. Was started on Ozempic for diabetes and weight loss to assist with getting to goal weight for bariatric surgery. Ozempic not covered, so switched to Victoza. Patient reports did not start Victoza as she didn't know if it was available at pharmacy. Patient is not experiencing side effects. Not testing blood sugars, not interested in testing as of now. Follows with dietitian  "monthly. Had recent psych evaluation necessary for potential surgery.   Blood sugar monitoring: never.   Symptoms of low blood sugar? none  Symptoms of high blood sugar? none  Eye exam: due  Foot exam: due  Aspirin: Not taking  Statin: No   ACEi/ARB: No.   Urine Albumin: No results found for: UMALCR   Lab Results   Component Value Date    A1C 6.5 05/11/2021     Wt Readings from Last 4 Encounters:   10/05/21 290 lb (131.5 kg)   07/02/21 295 lb (133.8 kg)   04/30/21 290 lb (131.5 kg)   04/22/21 295 lb (133.8 kg)     Estimated body mass index is 51.37 kg/m  as calculated from the following:    Height as of 10/5/21: 5' 3\" (1.6 m).    Weight as of 10/5/21: 290 lb (131.5 kg).    Recent Labs   Lab Test 05/11/21  1609   CHOL 186   HDL 37*   LDL 98   TRIG 257*     COVID-19:   COVID-19 Positive yesterday when had follow up with primary care provider. Daughter tested positive Monday. Couldn't smell or taste anything. No COVID-19 Vaccine prior, she discussed getting COVID-19 vaccine in future.     Depression:   Sertraline 25 mg daily     Reports that mood is good, finds the sertraline helpful. No concerns today otherwise.     Insomnia:   Hydroxyzine 10 mg nightly     Sleep is good, discussed this yesterday with primary care provider. No concerns. Finds the hydroxyzine helpful at night to fall asleep. Gets 8 hours of sleep most nights.     Vitamin D Deficiency:  Vitamin D 50,000 international unit(s) weekly for 2 months, started back in May - completed    She reports she did complete this regimen, did not start lower dose daily vitamin D after completed the above regimen.   Lab Results   Component Value Date    VITDT 9 (L) 05/11/2021       ----------------      I spent 20 minutes with this patient today. A copy of the visit note was provided to the patient's referring provider.    The patient was sent via [employer] a summary of these recommendations.     Lauren Bloch, PharmD  Medication Therapy Management Pharmacist   [facility]    Telemedicine Visit Details  Type of service:  Telephone visit  Start Time: 3:05 PM  End Time: 3:25 PM  Originating Location (patient location): Home  Distant Location (provider location):  [facility]     Medication Therapy Recommendations  Class 3 severe obesity due to excess calories with serious comorbidity and body mass index (BMI) of 50.0 to 59.9 in adult (H)    Rationale: Untreated condition - Needs additional medication therapy - Indication   Recommendation: Start Medication - VICTOZA PEN 18 MG/3ML soln - titration to 1.8 mg daily   Status: Patient Agreed - Adherence/Education         Lab test positive for detection of COVID-19 virus    Rationale: Preventive therapy - Needs additional medication therapy - Indication   Recommendation: Start Medication - PFIZER-Bantr COVID-19 VAC-CHEMO IM - get covid vaccine after recovered, also due for influenza   Status: Patient Agreed - Adherence/Education         Vitamin D deficiency    Rationale: Untreated condition - Needs additional medication therapy - Indication   Recommendation: Start Medication - vitamin D 50 MCG (2000 UT) Caps - 1 capsule daily   Status: Patient Agreed - Adherence/Education

## 2021-12-03 ENCOUNTER — TELEPHONE (OUTPATIENT)
Dept: ENDOCRINOLOGY | Facility: CLINIC | Age: 36
End: 2021-12-03
Payer: COMMERCIAL

## 2021-12-03 NOTE — TELEPHONE ENCOUNTER
Received out side records from [facility] from [facility]  2006 [address] [address] scan into patient chart with a note

## 2022-01-23 ENCOUNTER — MYC MEDICAL ADVICE (OUTPATIENT)
Dept: ENDOCRINOLOGY | Facility: CLINIC | Age: 37
End: 2022-01-23
Payer: COMMERCIAL

## 2022-01-23 DIAGNOSIS — E11.9 TYPE 2 DIABETES MELLITUS WITHOUT COMPLICATION, WITHOUT LONG-TERM CURRENT USE OF INSULIN (H): ICD-10-CM

## 2022-01-24 ENCOUNTER — PREP FOR PROCEDURE (OUTPATIENT)
Dept: ENDOCRINOLOGY | Facility: CLINIC | Age: 37
End: 2022-01-24
Payer: COMMERCIAL

## 2022-01-24 ENCOUNTER — CARE COORDINATION (OUTPATIENT)
Dept: ENDOCRINOLOGY | Facility: CLINIC | Age: 37
End: 2022-01-24
Payer: COMMERCIAL

## 2022-01-24 DIAGNOSIS — E55.9 VITAMIN D DEFICIENCY: ICD-10-CM

## 2022-01-24 DIAGNOSIS — Z01.818 PREOP TESTING: ICD-10-CM

## 2022-01-24 DIAGNOSIS — E66.01 MORBID OBESITY (H): Primary | ICD-10-CM

## 2022-01-24 RX ORDER — ONDANSETRON 2 MG/ML
4 INJECTION INTRAMUSCULAR; INTRAVENOUS
Status: CANCELLED | OUTPATIENT
Start: 2022-01-24

## 2022-01-24 RX ORDER — CEFAZOLIN SODIUM IN 0.9 % NACL 3 G/100 ML
3 INTRAVENOUS SOLUTION, PIGGYBACK (ML) INTRAVENOUS SEE ADMIN INSTRUCTIONS
Status: CANCELLED | OUTPATIENT
Start: 2022-01-24

## 2022-01-24 RX ORDER — ACETAMINOPHEN 325 MG/1
975 TABLET ORAL ONCE
Status: CANCELLED | OUTPATIENT
Start: 2022-01-24 | End: 2022-01-24

## 2022-01-24 RX ORDER — CEFAZOLIN SODIUM IN 0.9 % NACL 3 G/100 ML
3 INTRAVENOUS SOLUTION, PIGGYBACK (ML) INTRAVENOUS
Status: CANCELLED | OUTPATIENT
Start: 2022-01-24

## 2022-01-24 NOTE — PROGRESS NOTES
Tasklist updated and reviewed with patient via phone call.    Bariatric Task List    Fax:  Please fax all paperwork to: [phone] -     Status:  Is patient a candidate for bariatric surgery?:  patient is a candidate for bariatric surgery -     Cleared to schedule surgeon consult?:  cleared to schedule surgeon consult - 4/29/2021 appointment; To call [phone] to schedule another appointment before surgery. s   Status:  surgery evaluation in process -     Surgeon: Nanette  -     Aniyah surgery month/year: 3/22/2022 -        Insurance: Insurance:  [employer]  -        Patient Info: Initial Weight:  290 -     Date of Initial Weight/Height:  2/16/2021 -     Goal Weight (lbs):  280 - encouraged more    Required Weight Loss:  10 -     Surgery Type:  sleeve gastrectomy -        Dietician Visits: Structured weight loss required by insurance?:  structured weight loss required -     Dietician Visit 1:  Completed - 2/16/21. [facility]   Dietician Visit 2:  Completed - 3/17/21 appt.; 3/24/21 KK   Dietician Visit 3:  Completed - 4/20/21 appt. ; 4/21/21 KK   Dietician Visit 4:  Completed - 8/3/21 LP   Dietician Visit 5:  Completed - 9/30/2021. s   Dietician Visit 6:  Needed - Call [phone] to review postop diet and for weight loss. s   Dietician Visit additional:  Needed - Monthly if needed to reach pre-surgery goal weight or for postop diet teaching. [facility]      Psychological Evaluation: Psych eval:  Completed - completed, waiting on results as of 9/30 - LP; 11/15/21 Cleared by Dr Solis. [facility]   Therapist letter of support:    -        Lab Work: Complete Blood Count:  Completed - Ordered 2/16/21 - AS; Complete 5/11/21 AS   Comprehensive Metabolic Panel:  Completed - Ordered 2/16/21 - AS; Complete 5/11/21 AS   Vitamin D:  Needed - Complete 5/11/21 AS=9. Recheck. [facility]   PTH:  Completed - Ordered 2/16/21 - AS; Complete 5/11/21 AS   Hgb A1c:  Completed - Ordered 2/16/21 - AS; Complete 5/11/21 AS   Other:  Completed - lipids Ordered  "2/16/21 - AS; Complete 5/11/21 AS      PCP: Establish care with PCP:  Completed - Elizabeth Pascual is PCP per pt    Follow up with PCP:    -     PCP letter of support:  Completed - done 7/15/21, sent via Natanael Ulien      Patient Education:  Information Session:  Completed - 2/12/21. bks   Given \"Making your decision\" handout?:  Yes -     Given \"A Roadmap to you Weight Loss Surgery\" handout?: Yes -     Given \"Get Well Loop\" information?: Yes -     Given support group information?:  support group contact information provided - 2/23/21   Attended support group?:    -     Support plan in place?:    -        Final Tasks:  Before surgery online class:  Needed -     Before surgery online class website link:  [email or web address]   After surgery online class:  Needed -     After surgery online class website link:  [email or web address]   Nurse visit per clinic:  Needed -     History and Physical per clinic:   -  PreAssessment Clinic   Final labs per clinic: Needed -        Notes: Please register for the Get Well Loop when you get an email invitation and a surgery date.     The Get Well Loop will give you information via email or text messages that can help you be more successful before and after surgery.  It can also help answer any questions you may have.  Get Well Loop Information  [email or web address]          "

## 2022-01-25 ENCOUNTER — TELEPHONE (OUTPATIENT)
Dept: ENDOCRINOLOGY | Facility: CLINIC | Age: 37
End: 2022-01-25
Payer: COMMERCIAL

## 2022-01-25 RX ORDER — LIRAGLUTIDE 6 MG/ML
1.8 INJECTION SUBCUTANEOUS DAILY
Qty: 9 ML | Refills: 1 | Status: ON HOLD | OUTPATIENT
Start: 2022-01-25 | End: 2022-03-23

## 2022-01-25 RX ORDER — METFORMIN HCL 500 MG
TABLET, EXTENDED RELEASE 24 HR ORAL
Qty: 60 TABLET | Refills: 3 | Status: ON HOLD | OUTPATIENT
Start: 2022-01-25 | End: 2022-03-23

## 2022-01-25 NOTE — PROGRESS NOTES
"Babita Rivas is a 36 year old female who is being evaluated via a billable telephone visit.     The patient has been notified of following:     \"This telephone visit will be conducted via a call between you and your physician/provider. We have found that certain health care needs can be provided without the need for a physical exam.  This service lets us provide the care you need with a short phone conversation.  If a prescription is necessary we can send it directly to your pharmacy.  If lab work is needed we can place an order for that and you can then stop by our lab to have the test done at a later time.    Telephone visits are billed at different rates depending on your insurance coverage. During this emergency period, for some insurers they may be billed the same as an in-person visit.  Please reach out to your insurance provider with any questions.    If during the course of the call the physician/provider feels a telephone visit is not appropriate, you will not be charged for this service.\"    Patient has given verbal consent for Telephone visit?  Yes    How would you like to obtain your AVS? My Chart    Phone call duration: 30 minutes    During this virtual visit the patient is located in [place], patient verifies this as the location during the entirety of this visit.     Bariatric Nutrition Consultation Note    Reason For Visit: Nutrition Assessment    Babita Rivas is a 36 year old presenting today for bariatric nutrition consult.   Pt is interested in laparoscopic sleeve gastrectomy with Dr. Fitzpatrick, tentatively scheduled for 3/22/22.     Post-op education will be done at a later date.    This is pt's 7th of 3rd required nutrition visits prior to surgery.      Pt referred by Arlette Corbett NP on February 15, 2021.    Patient with Co-morbidities of obesity including:  Type II DM yes, dx in May with A1C 6.5, now 6.2 as of 9/8  Renal Failure no  Sleep apnea no  Hypertension no   Dyslipidemia no  Joint " "pain no  Back pain no  GERD no     ANTHROPOMETRICS:  Consult weight: 290 lbs with a BMI 52.26 kg/m2     Estimated body mass index is 51.37 kg/m  as calculated from the following:    Height as of 10/5/21: 1.6 m (5' 3\").    Weight as of 10/5/21: 131.5 kg (290 lb).    Current weight: not sure  Has appt coming up with PCP (31st of this month), will get weighed at that     Required weight loss goal pre-op: 10 lbs from initial consult weight (goal weight 280 lbs or less before surgery)    Medications:   Metformin   - Stomach pain if taking metformin on empty stomach, makes her want to eat more.    Victoza: recently started after being on topiramate previously    SUPPLEMENT INFORMATION:  None at this time per pt report today.     Pt was recommended to take Vit D due to low Vit d. Labs done 5/11/21 showed Vit D of 9. Prescription for 50,000 IUs/week was sent to the pharmacy at that time. Was recommended to take 1,000 to 2,000 IUs/day following that dose     Took high dose Vit D but has not taken any since. Recommended to start daily dose of 0197-7544 international unit(s)/day.    Prefers B12 injection post-op.     Diet History:  Pt last seen by RD 4 months ago - was recommended monthly follow up.  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 Has some time between routes to stretch/move around. 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  Reports she feels eating is going well. Has stopped eating fried foods. Eating 3 times per day.  AM - oatmeal or scrambled eggs  Mid-day: snacks while at work (school bus): hard boiled eggs, turkey or ham tortilla wrap, yogurt cup  Dinner: pepper steak, rice, makes mashed potatoes for kids but doesn't eat them. Typically 1 cup rice and 1/2 cup meat. Sometimes eats out: seafood (shrimp) with rice and broccoli  Feels she is chewing thoroughly, says that is easier for her compared to other changes.  Fluids: Feels she drinks a lot (about a gallon) throughout the " day. Tea, Crystal light or other flavored water, occasionally milk.    NUTRITION DIAGNOSIS:  Obesity r/t positive energy balance aeb BMI >30 kg/m2.      INTERVENTION:  Intervention Provided:  - Reviewed previous goals and encouraged patient to continue goals prior to surgery.   - Education Provided on post-op diet guidelines,  ways to help prepare for post-op diet guidelines pre-operatively.    - Recommend starting daily Vitamin D 0482-6333 international unit(s)/day    Patient demonstrates understanding. Provided pt with list of goals RD contact information.    Patient Engagement: good    Goals:  1) Continue chewing food well.  2) Continue eating 3 times per day.  3) Drinking water: Take small sips throughout the day - try filling a medicine cup and practice drinking that small amount every 15 minutes.  4) Start daily vitamin D supplement 9623-4231 IU/day.    RESOURCES:  Healthy Living with Diabetes  https://www.diabetes.org/healthy-living      Diabetes Friendly Recipes  https://www.diabetes.org/healthy-living/recipes-nutrition      Take the following after a Sleeve Gastrectomy:  - Multivitamin/minerals: adult dose 1-2 times daily  Ideally want one that provides:   5,000 - 10,000 international units vitamin A daily              800 mg oral folate daily  8 - 22 mg zinc and 1 - 2 mg copper daily  - Iron: 45-60 mg elemental (18-36 mg if low risk) - may partly or fully be covered in multivitamin   - Calcium Citrate containing vitamin D: 500 mg 3 times daily or 600 mg 2 times daily              - Separate the calcium from your multivitmain or iron by at least 2 hours.               - Must be a chewable calcium citrate until post-op 3 months               - Options for calcium citrate: Dominik calcium citrate chewable, bariatric advantage calcium citrate chewable, Celebrate vitamins calcium citrate chewable, Bariatric Fusion calcium citrate chewable  - Vitamin D - at least 3,000 international units/day between all  supplements  - Vitamin B12: sublingual form of at least 500 mcg daily or injection of 1000 mcg monthly     Post-op Diet Advancement Schedule:  Clear Liquid Diet (stage 1): 3/21 - 3/22  *No RED, PROTEIN, or PULP day before surgery  Low-Fat Full Liquid Diet (stage 2): 3/23 - 3/29   Pureed Diet (stage 3): 3/30 - 4/12  Soft Diet (stage 4): 4/13 - 5/10  Regular Diet (stage 5): 5/11    Post-op Diet Handouts:  Diet Guidelines after Weight-loss Surgery  [email or web address]     Your Stage 1 Diet: Clear Liquids  [email or web address]     High Protein Clear Liquid options:  BiPro  Premier Protein clear  [email or web address]  Bone broth  Beneprotein protein powder mixed with 4-6 oz of fluid  [id number]    Your Stage 2 Diet: Low-fat Full Liquids  [email or web address]     Your Stage 3 Diet: Pureed Foods  [email or web address]     Pureed Recipes  [email or web address]    Your Stage 4 Diet: Soft Foods  [email or web address]    Your Stage 5 Diet: Regular Foods  [email or web address]    Keeping Track of Fluids  [email or web address]    Time spent with patient: 30 minutes.    Follow-up: Thursday, March 3 at 10:30AM    BUNNY Corral, RD, LD  Michelle Jones, Dietetic Intern

## 2022-01-25 NOTE — TELEPHONE ENCOUNTER
Called patient to update Epic as her previous [facility] insurance termed on 12/31/21. She now has [facility] individual family care and does have coverage per phone call to [facility] co today.

## 2022-01-25 NOTE — TELEPHONE ENCOUNTER
Patient requesting refills of Victoza and Metformin.  Patient scheduled for surgery in March.  Refills sent to pharmacy per protocol.  Patient instructed to make follow up appointment with Arlette Corbett NP.

## 2022-01-27 ENCOUNTER — VIRTUAL VISIT (OUTPATIENT)
Dept: ENDOCRINOLOGY | Facility: CLINIC | Age: 37
End: 2022-01-27
Payer: COMMERCIAL

## 2022-01-27 DIAGNOSIS — E66.01 MORBID OBESITY (H): ICD-10-CM

## 2022-01-27 DIAGNOSIS — E55.9 VITAMIN D DEFICIENCY: ICD-10-CM

## 2022-01-27 DIAGNOSIS — Z71.3 NUTRITIONAL COUNSELING: Primary | ICD-10-CM

## 2022-01-27 PROCEDURE — 97803 MED NUTRITION INDIV SUBSEQ: CPT | Mod: 95 | Performed by: DIETITIAN, REGISTERED

## 2022-01-27 NOTE — LETTER
"1/27/2022       RE: Babita Rivas  [address]  [facility] [address]     Dear Colleague,    Thank you for referring your patient, Babita Rivas, to the [facility] [place] [facility]. Please see a copy of my visit note below.    Babita Rivas is a 36 year old female who is being evaluated via a billable telephone visit.     The patient has been notified of following:     \"This telephone visit will be conducted via a call between you and your physician/provider. We have found that certain health care needs can be provided without the need for a physical exam.  This service lets us provide the care you need with a short phone conversation.  If a prescription is necessary we can send it directly to your pharmacy.  If lab work is needed we can place an order for that and you can then stop by our lab to have the test done at a later time.    Telephone visits are billed at different rates depending on your insurance coverage. During this emergency period, for some insurers they may be billed the same as an in-person visit.  Please reach out to your insurance provider with any questions.    If during the course of the call the physician/provider feels a telephone visit is not appropriate, you will not be charged for this service.\"    Patient has given verbal consent for Telephone visit?  Yes    How would you like to obtain your AVS? My Chart    Phone call duration: 30 minutes    During this virtual visit the patient is located in [place], patient verifies this as the location during the entirety of this visit.     Bariatric Nutrition Consultation Note    Reason For Visit: Nutrition Assessment    Babita Rivas is a 36 year old presenting today for bariatric nutrition consult.   Pt is interested in laparoscopic sleeve gastrectomy with Dr. Fitzpatrick, tentatively scheduled for 3/22/22.     Post-op education will be done " "at a later date.    This is pt's 7th of 3rd required nutrition visits prior to surgery.      Pt referred by Arlette Corbett NP on February 15, 2021.    Patient with Co-morbidities of obesity including:  Type II DM yes, dx in May with A1C 6.5, now 6.2 as of 9/8  Renal Failure no  Sleep apnea no  Hypertension no   Dyslipidemia no  Joint pain no  Back pain no  GERD no     ANTHROPOMETRICS:  Consult weight: 290 lbs with a BMI 52.26 kg/m2     Estimated body mass index is 51.37 kg/m  as calculated from the following:    Height as of 10/5/21: 1.6 m (5' 3\").    Weight as of 10/5/21: 131.5 kg (290 lb).    Current weight: not sure  Has appt coming up with PCP (31st of this month), will get weighed at that     Required weight loss goal pre-op: 10 lbs from initial consult weight (goal weight 280 lbs or less before surgery)    Medications:   Metformin   - Stomach pain if taking metformin on empty stomach, makes her want to eat more.    Victoza: recently started after being on topiramate previously    SUPPLEMENT INFORMATION:  None at this time per pt report today.     Pt was recommended to take Vit D due to low Vit d. Labs done 5/11/21 showed Vit D of 9. Prescription for 50,000 IUs/week was sent to the pharmacy at that time. Was recommended to take 1,000 to 2,000 IUs/day following that dose     Took high dose Vit D but has not taken any since. Recommended to start daily dose of 1295-4667 international unit(s)/day.    Prefers B12 injection post-op.     Diet History:  Pt last seen by SCOTTY 4 months ago - was recommended monthly follow up.  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 Has some time between routes to stretch/move around. 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  Reports she feels eating is going well. Has stopped eating fried foods. Eating 3 times per day.  AM - oatmeal or scrambled eggs  Mid-day: snacks while at work (school bus): hard boiled eggs, turkey or ham tortilla wrap, yogurt " cup  Dinner: pepper steak, rice, makes mashed potatoes for kids but doesn't eat them. Typically 1 cup rice and 1/2 cup meat. Sometimes eats out: seafood (shrimp) with rice and broccoli  Feels she is chewing thoroughly, says that is easier for her compared to other changes.  Fluids: Feels she drinks a lot (about a gallon) throughout the day. Tea, Crystal light or other flavored water, occasionally milk.    NUTRITION DIAGNOSIS:  Obesity r/t positive energy balance aeb BMI >30 kg/m2.      INTERVENTION:  Intervention Provided:  - Reviewed previous goals and encouraged patient to continue goals prior to surgery.   - Education Provided on post-op diet guidelines,  ways to help prepare for post-op diet guidelines pre-operatively.    - Recommend starting daily Vitamin D 2705-1684 international unit(s)/day    Patient demonstrates understanding. Provided pt with list of goals RD contact information.    Patient Engagement: good    Goals:  1) Continue chewing food well.  2) Continue eating 3 times per day.  3) Drinking water: Take small sips throughout the day - try filling a medicine cup and practice drinking that small amount every 15 minutes.  4) Start daily vitamin D supplement 2856-0271 IU/day.    RESOURCES:  Healthy Living with Diabetes  https://www.diabetes.org/healthy-living      Diabetes Friendly Recipes  https://www.diabetes.org/healthy-living/recipes-nutrition      Take the following after a Sleeve Gastrectomy:  - Multivitamin/minerals: adult dose 1-2 times daily  Ideally want one that provides:   5,000 - 10,000 international units vitamin A daily              800 mg oral folate daily  8 - 22 mg zinc and 1 - 2 mg copper daily  - Iron: 45-60 mg elemental (18-36 mg if low risk) - may partly or fully be covered in multivitamin   - Calcium Citrate containing vitamin D: 500 mg 3 times daily or 600 mg 2 times daily              - Separate the calcium from your multivitmain or iron by at least 2 hours.               - Must  be a chewable calcium citrate until post-op 3 months               - Options for calcium citrate: Dominik calcium citrate chewable, bariatric advantage calcium citrate chewable, Celebrate vitamins calcium citrate chewable, Bariatric Fusion calcium citrate chewable  - Vitamin D - at least 3,000 international units/day between all supplements  - Vitamin B12: sublingual form of at least 500 mcg daily or injection of 1000 mcg monthly     Post-op Diet Advancement Schedule:  Clear Liquid Diet (stage 1): 3/21 - 3/22  *No RED, PROTEIN, or PULP day before surgery  Low-Fat Full Liquid Diet (stage 2): 3/23 - 3/29   Pureed Diet (stage 3): 3/30 - 4/12  Soft Diet (stage 4): 4/13 - 5/10  Regular Diet (stage 5): 5/11    Post-op Diet Handouts:  Diet Guidelines after Weight-loss Surgery  [email or web address]     Your Stage 1 Diet: Clear Liquids  [email or web address]     High Protein Clear Liquid options:  BiPro  Premier Protein clear  [id number]  Bone broth  Beneprotein protein powder mixed with 4-6 oz of fluid  [id number]    Your Stage 2 Diet: Low-fat Full Liquids  [email or web address]     Your Stage 3 Diet: Pureed Foods  [email or web address]     Pureed Recipes  [email or web address]    Your Stage 4 Diet: Soft Foods  [email or web address]    Your Stage 5 Diet: Regular Foods  [email or web address]    Keeping Track of Fluids  [email or web address]    Time spent with patient: 30 minutes.    Follow-up: Thursday, March 3 at 10:30AM    BUNNY Corral, RD, LD  Michelle Jones, Dietetic Intern

## 2022-01-27 NOTE — PATIENT INSTRUCTIONS
Goals:  1) Continue chewing food well.  2) Continue eating 3 times per day.  3) Drinking water: Take small sips throughout the day - try filling a medicine cup and practice drinking that small amount every 15 minutes.  4) Start daily vitamin D supplement 7210-0641 IU/day.    RESOURCES:  Healthy Living with Diabetes  https://www.diabetes.org/healthy-living      Diabetes Friendly Recipes  https://www.diabetes.org/healthy-living/recipes-nutrition      Take the following after a Sleeve Gastrectomy:  - Multivitamin/minerals: adult dose 1-2 times daily  Ideally want one that provides:   5,000 - 10,000 international units vitamin A daily              800 mg oral folate daily  8 - 22 mg zinc and 1 - 2 mg copper daily  - Iron: 45-60 mg elemental (18-36 mg if low risk) - may partly or fully be covered in multivitamin   - Calcium Citrate containing vitamin D: 500 mg 3 times daily or 600 mg 2 times daily              - Separate the calcium from your multivitmain or iron by at least 2 hours.               - Must be a chewable calcium citrate until post-op 3 months               - Options for calcium citrate: Dominik calcium citrate chewable, bariatric advantage calcium citrate chewable, Celebrate vitamins calcium citrate chewable, Bariatric Fusion calcium citrate chewable  - Vitamin D - at least 3,000 international units/day between all supplements  - Vitamin B12: sublingual form of at least 500 mcg daily or injection of 1000 mcg monthly     Post-op Diet Advancement Schedule:  Clear Liquid Diet (stage 1): 3/21 - 3/22  *No RED, PROTEIN, or PULP day before surgery  Low-Fat Full Liquid Diet (stage 2): 3/23 - 3/29   Pureed Diet (stage 3): 3/30 - 4/12  Soft Diet (stage 4): 4/13 - 5/10  Regular Diet (stage 5): 5/11    Post-op Diet Handouts:  Diet Guidelines after Weight-loss Surgery  [email or web address]     Your Stage 1 Diet: Clear Liquids  [email or web address]     High Protein Clear Liquid options:  BiPro  Premier  Protein clear  [id number]  Bone broth  Beneprotein protein powder mixed with 4-6 oz of fluid  [id number]    Your Stage 2 Diet: Low-fat Full Liquids  [email or web address]     Your Stage 3 Diet: Pureed Foods  [email or web address]     Pureed Recipes  [email or web address]    Your Stage 4 Diet: Soft Foods  [email or web address]    Your Stage 5 Diet: Regular Foods  [email or web address]    Keeping Track of Fluids  [email or web address]      Follow-up: Thursday, March 3 at 10:30AM    BUNNY Rivera, RD, LD  Michelle Jones, Dietetic Intern  Clinic #: [phone]

## 2022-01-30 ENCOUNTER — HEALTH MAINTENANCE LETTER (OUTPATIENT)
Age: 37
End: 2022-01-30

## 2022-01-31 ENCOUNTER — TELEPHONE (OUTPATIENT)
Dept: ENDOCRINOLOGY | Facility: CLINIC | Age: 37
End: 2022-01-31
Payer: COMMERCIAL

## 2022-01-31 NOTE — TELEPHONE ENCOUNTER
Left VM message asking patient to call me to schedule an appointment with Dr. Fitzpatrick, either video or in-person visit. Last seen in April 2021. Has [facility] which requires surgeon visit closer to OR date. My direct call back number left.

## 2022-02-01 ENCOUNTER — TELEPHONE (OUTPATIENT)
Dept: ENDOCRINOLOGY | Facility: CLINIC | Age: 37
End: 2022-02-01
Payer: COMMERCIAL

## 2022-02-01 NOTE — TELEPHONE ENCOUNTER
Patient called back to schedule appointment with Dr. Fitzpatrick, prefers a video visit before 2 p.m. Scheduled 2/3/22 at 11 a.m. Discussed I will call her next month to schedule the PAC pre-op H&P and will need COVID testing 4 days prior to surgery.

## 2022-02-03 ENCOUNTER — VIRTUAL VISIT (OUTPATIENT)
Dept: ENDOCRINOLOGY | Facility: CLINIC | Age: 37
End: 2022-02-03
Payer: COMMERCIAL

## 2022-02-03 DIAGNOSIS — E66.01 MORBID OBESITY (H): Primary | ICD-10-CM

## 2022-02-03 PROCEDURE — 99213 OFFICE O/P EST LOW 20 MIN: CPT | Mod: 95 | Performed by: SURGERY

## 2022-02-03 ASSESSMENT — PAIN SCALES - GENERAL: PAINLEVEL: NO PAIN (0)

## 2022-02-03 NOTE — PROGRESS NOTES
"Dear Dr. Pascual,    I had the pleasure of meeting with your patient Babita Rivas in our virtual weight loss surgery office.  This patient is a 36 year old female who has been undergoing our thorough preoperative screening process in anticipation of potential bariatric surgery.    I previously saw Babita mcmahon 10 months ago and I saw her again to re-review risks and perioperative expectations.    Initial consult height, weight, and BMI in our clinic 157.5 cm (5' 2\"), Initial Weight (lbs): 290 lbs and Initial BMI: 49.78.    PREVIOUS WEIGHT LOSS ATTEMPTS:     2/9/2021   I have tried the following methods to lose weight Watching portions or calories, Exercise, Slimfast, Prescription Medications       CO-MORBIDITIES OF OBESITY INCLUDE:     2/9/2021   I have the following health issues associated with obesity: Asthma       VITALS:  There were no vitals taken for this visit.    PE:  GENERAL: Alert and oriented x3. NAD  RESPIRATORY: Breathing unlabored    In summary, she has undergone an appropriate medical evaluation, dietitian evaluation, as well as psychologic screening. The patient appears to be an appropriate candidate for bariatric surgery.    In the office today, I discussed the laparoscopic gastric sleeve surgery.  Risks, benefits and anticipated outcomes were outlined including the risk of death, staple line leak (1-2%), PE, DVT, ulcer, worsening GERD, N/V, stricture, hernia, wound infection, weight regain, and vitamin deficiencies. This patient has a good chance of sustaining permanent weight loss due to this procedure.  This should also allow improvement if not resolution of his/her weight related medical conditions.    At present we are going to present your patient's file for prior authorization to insurance.  Pending prior authorization, I anticipate a surgical date in the near future.  We will keep you updated on any progress.  If you have questions regarding care please feel free to contact me.  " Total time spent in the clinic on charting, documentation, chart review, and face to face was 20 minutes with greater than 50% in face-to-face consultation.        Sincerely,    Ky Fitzpatrick MD    Please route or send letter to:  Primary Care Provider (PCP) and Referring Provider

## 2022-02-09 ENCOUNTER — TELEPHONE (OUTPATIENT)
Dept: ENDOCRINOLOGY | Facility: CLINIC | Age: 37
End: 2022-02-09
Payer: COMMERCIAL

## 2022-02-09 NOTE — TELEPHONE ENCOUNTER
Received prior authorization from [facility] for sleeve gastrectomy scheduled 3/22/22 with Dr. Fitzpatrick. Auth # [id number] effective 02/09/2022 - 08/08/2022 per letter dated 02/09/22.

## 2022-02-10 ENCOUNTER — TELEPHONE (OUTPATIENT)
Dept: ENDOCRINOLOGY | Facility: CLINIC | Age: 37
End: 2022-02-10
Payer: COMMERCIAL

## 2022-02-11 ENCOUNTER — TELEPHONE (OUTPATIENT)
Dept: ENDOCRINOLOGY | Facility: CLINIC | Age: 37
End: 2022-02-11
Payer: COMMERCIAL

## 2022-02-11 ENCOUNTER — TEAM CONFERENCE (OUTPATIENT)
Dept: ENDOCRINOLOGY | Facility: CLINIC | Age: 37
End: 2022-02-11
Payer: COMMERCIAL

## 2022-02-11 NOTE — TELEPHONE ENCOUNTER
FUTURE VISIT INFORMATION      SURGERY INFORMATION:    Date: 3/22/22    Location: uu or    Surgeon:  Ky Fitzpatrick MD    Anesthesia Type: general    Procedure: GASTRECTOMY, SLEEVE, LAPAROSCOPICHERNIORRHAPHY, HIATAL, LAPAROSCOPIC      Consult: virtual visit 2/3    RECORDS REQUESTED FROM:       Primary Care Provider: Elizabeth Pascual PA  - [facility]    Most recent EKG+ Tracin/22/15- Theodore

## 2022-02-11 NOTE — TELEPHONE ENCOUNTER
Discussed at the Bariatric Team Meeting on 2/10/22, Patient Review.    Name: Babita Rivas  MRN: [id number]  OR date: 3/22/21  Surgeon: Nanette  Dietitian: Yas  Initial wt: 290 lbs  Goal wt: 280  Last wt and date: 290 on 10/5/21; Not sure of wt on 1/27/22, to check 1/31/22 at PCP (no documentation faxed to us).  Last RD appt: 1/27/22  Plan: 3/3/22 RD visit. Need documented weight.  3/1/22 PAC Visit, 3/18/22 Covid testing, postop appts scheduled.

## 2022-03-01 ENCOUNTER — VIRTUAL VISIT (OUTPATIENT)
Dept: SURGERY | Facility: CLINIC | Age: 37
End: 2022-03-01
Payer: COMMERCIAL

## 2022-03-01 ENCOUNTER — MYC MEDICAL ADVICE (OUTPATIENT)
Dept: SURGERY | Facility: CLINIC | Age: 37
End: 2022-03-01

## 2022-03-01 ENCOUNTER — ANESTHESIA EVENT (OUTPATIENT)
Dept: SURGERY | Facility: CLINIC | Age: 37
End: 2022-03-01

## 2022-03-01 ENCOUNTER — PRE VISIT (OUTPATIENT)
Dept: SURGERY | Facility: CLINIC | Age: 37
End: 2022-03-01
Payer: COMMERCIAL

## 2022-03-01 DIAGNOSIS — Z01.818 PREOP EXAMINATION: Primary | ICD-10-CM

## 2022-03-01 DIAGNOSIS — E66.01 MORBID OBESITY (H): ICD-10-CM

## 2022-03-01 PROCEDURE — 99203 OFFICE O/P NEW LOW 30 MIN: CPT | Mod: 95 | Performed by: PHYSICIAN ASSISTANT

## 2022-03-01 ASSESSMENT — ENCOUNTER SYMPTOMS: SEIZURES: 0

## 2022-03-01 ASSESSMENT — PAIN SCALES - GENERAL: PAINLEVEL: NO PAIN (0)

## 2022-03-01 ASSESSMENT — LIFESTYLE VARIABLES: TOBACCO_USE: 0

## 2022-03-01 NOTE — ANESTHESIA PREPROCEDURE EVALUATION
Anesthesia Pre-Procedure Evaluation    Patient: Babita Rivas   MRN: [id number] : 1985        Procedure :   Video Visit       Past Medical History:   Diagnosis Date     Depression      Diabetes mellitus, type 2 (H)      Exercise-induced asthma      Morbid obesity (H)      PTSD (post-traumatic stress disorder)       Past Surgical History:   Procedure Laterality Date      SECTION      x 2     ESWL FOR GALLSTONES       TONSILLECTOMY       TUBAL LIGATION        No Known Allergies   Social History     Tobacco Use     Smoking status: Never Smoker     Smokeless tobacco: Never Used     Tobacco comment: an occassional cigarette a long time ago   Substance Use Topics     Alcohol use: Not on file      Wt Readings from Last 1 Encounters:   10/05/21 131.5 kg (290 lb)              OUTSIDE LABS:  CBC:   Lab Results   Component Value Date    WBC 13.0 (H) 2021    HGB 14.1 2021    HCT 43.9 2021     2021     BMP:   Lab Results   Component Value Date     2021    POTASSIUM 3.9 2021    CHLORIDE 109 2021    CO2 23 2021    BUN 10 2021    CR 1.00 2021     (H) 2021     COAGS: No results found for: PTT, INR, FIBR  POC: No results found for: BGM, HCG, HCGS  HEPATIC:   Lab Results   Component Value Date    ALBUMIN 3.8 2021    PROTTOTAL 7.5 2021    ALT 63 (H) 2021    AST 30 2021    ALKPHOS 69 2021    BILITOTAL 0.5 2021     OTHER:   Lab Results   Component Value Date    A1C 6.5 (H) 2021    CHEO 8.7 2021    TSH 0.86 2021               Lauren Okeefe PA-C

## 2022-03-01 NOTE — H&P
Pre-Operative H & P     CC:  Preoperative exam to assess for increased cardiopulmonary risk while undergoing surgery and anesthesia.    Date of Encounter: 3/1/2022  Primary Care Physician:  Elizabeth Pascual     Reason for Visit: Morbid obesity    HPI  Babita Rivas is a 35 y/o female who presents for pre-operative H&P in preparation for GASTRECTOMY, SLEEVE, LAPAROSCOPIC; HERNIORRHAPHY, HIATAL, LAPAROSCOPICwith Ky Fitzpatrick MD on 3/22/22 at [facility] for treatment of morbid obesity.     Patient is being evaluated for comorbid conditions of exercise induced asthma, depression, PTSD, DM2.    Ms. Rivas has been unsuccessful with other methods of permanent weight loss and suffers from multiple weight related medical conditions.  Due to lack of success in achieving weight loss through other methods, she is interested in undergoing the above procedure.    History was obtained from patient & chart review.    Hx of abnormal bleeding or anti-platelet use: denies    Menstrual history: Patient's last menstrual period was 2022.:       Past Medical History  Past Medical History:   Diagnosis Date     Depression      Diabetes mellitus, type 2 (H)      Exercise-induced asthma      Morbid obesity (H)      PTSD (post-traumatic stress disorder)        Past Surgical History  Past Surgical History:   Procedure Laterality Date      SECTION      x 2     ESWL FOR GALLSTONES       TONSILLECTOMY       TUBAL LIGATION         Prior to Admission Medications  Current Outpatient Medications   Medication Sig Dispense Refill     liraglutide (VICTOZA PEN) 18 MG/3ML solution Inject 1.8 mg Subcutaneous daily (Patient taking differently: Inject 1.8 mg Subcutaneous daily (with dinner) ) 9 mL 1     metFORMIN (GLUCOPHAGE-XR) 500 MG 24 hr tablet 1 tablet by mouth daily with meal for 1 week, then 2 tablets daily with meal. (Patient taking differently: daily (with dinner) 1 tablet by mouth  daily with meal for 1 week, then 2 tablets daily with meal.) 60 tablet 3     hydrOXYzine (ATARAX) 10 MG tablet Take 10 mg by mouth (Patient not taking: Reported on 3/1/2022)       insulin pen needle (32G X 6 MM) 32G X 6 MM miscellaneous Use 1 pen needles daily or as directed with Victoza. 100 each 1     sertraline (ZOLOFT) 25 MG tablet Take 25 mg by mouth daily  (Patient not taking: Reported on 3/1/2022)       vitamin D3 (CHOLECALCIFEROL) 50 mcg (2000 units) tablet Take 1 tablet by mouth daily (Patient not taking: Reported on 3/1/2022)         Allergies  No Known Allergies    Social History  Social History     Socioeconomic History     Marital status: Single     Spouse name: Not on file     Number of children: Not on file     Years of education: Not on file     Highest education level: Not on file   Occupational History     Not on file   Tobacco Use     Smoking status: Never Smoker     Smokeless tobacco: Never Used     Tobacco comment: an occassional cigarette a long time ago   Substance and Sexual Activity     Alcohol use: Not on file     Drug use: Not on file     Sexual activity: Not on file   Other Topics Concern     Not on file   Social History Narrative     Not on file     Social Determinants of Health     Financial Resource Strain: Not on file   Food Insecurity: Not on file   Transportation Needs: Not on file   Physical Activity: Not on file   Stress: Not on file   Social Connections: Not on file   Intimate Partner Violence: Not on file   Housing Stability: Not on file       Family History  Family History   Problem Relation Age of Onset     Heart Disease Maternal Uncle      Anesthesia Reaction No family hx of      Deep Vein Thrombosis (DVT) No family hx of        Review of Systems  The complete review of systems is negative other than noted in the HPI or here.     Anesthesia Evaluation   Pt has had prior anesthetic.     No history of anesthetic complications       ROS/MED HX  ENT/Pulmonary: Comment: Exercised  induced asthma, hasn't used inhaler seen a teenager    (+) Intermittent, asthma Treatment: Inhaler prn,   (-) tobacco use   Neurologic:  - neg neurologic ROS  (-) no seizures and no CVA   Cardiovascular:  - neg cardiovascular ROS     METS/Exercise Tolerance: 4 - Raking leaves, gardening    Hematologic:  - neg hematologic  ROS  (-) history of blood clots and history of blood transfusion   Musculoskeletal:  - neg musculoskeletal ROS     GI/Hepatic: Comment: Hx gallstones - neg GI/hepatic ROS  (-) GERD and liver disease   Renal/Genitourinary:  - neg Renal ROS  (-) renal disease   Endo:     (+) type II DM, Last HgA1c: 6.2, date: 9/8/21, Not using insulin, Obesity,     Psychiatric/Substance Use: Comment: PTSD      (+) psychiatric history depression     Infectious Disease:  - neg infectious disease ROS     Malignancy:  - neg malignancy ROS     Other:  - neg other ROS          Virtual visit -  No vitals were obtained    Physical Exam  Constitutional: Awake, alert, cooperative, no apparent distress, and appears stated age.  Eyes: Pupils equal  HENT: Normocephalic  Respiratory: non labored breathing   Neurologic: Awake, alert, oriented to name, place and time.   Neuropsychiatric: Calm, cooperative. Normal affect.      Prior Labs/Diagnostic Studies   All labs and imaging personally reviewed     WBC 4.3 - 10.8 K/uL 15.6 High     RBC 4.20 - 5.40 M/uL 5.10    HEMOGLOBIN 12.0 - 16.0 gm/dL 14.9    HEMATOCRIT 36.0 - 48.0 % 45.0    MCV 80 - 100 fl 88    MCH 27 - 33 pg 29    MCHC 33 - 36 gm/dL 33    RDW 11.5 - 14.5 % 12.6    PLATELET COUNT 150 - 400 K/    MPV 6.5 - 12 10.5    PMN % % 57.6    IG% <=1.0 % 0.4    LYMPH % % 31.5    MONO % % 8.6    EOS % % 1.5    BASO % % 0.4    PMN ABSOLUTE 1.80 - 7.80 K/uL 8.96 High     IG ABSOLUTE K/uL 0.07    LYMPH ABSOLUTE 1.00 - 4.00 K/uL 4.91 High     MONO ABSOLUTE 0.00 - 1.00 K/uL 1.34 High     EOS ABSOLUTE 0.00 - 0.45 K/uL 0.24    BASO ABSOLUTE 0.00 - 0.20 K/uL 0.06    NUCL RBC % 0.0 - 0.0  /100 WBC 0.0    NUCL RBC ABSOLUTE 0.00 - 0.00 K/uL 0.00    Resulting Agency  [facility]   Specimen Collected: 12/20/21       SODIUM 136 - 145 mmol/L 143    POTASSIUM 3.4 - 5.1 mmol/L 4.2    Comment: Interpret with caution, specimen slightly hemolyzed. Results may be affected   CHLORIDE 98 - 108 mmol/L 109 High     CARBON DIOXIDE 20 - 31 mmol/L 25    BUN (UREA NITRO) 9 - 23 mg/dL 5 Low     Comment: Interpret with caution, specimen slightly hemolyzed. Results may be affected   CREATININE 0.50 - 1.00 mg/dL 0.78    EST GFR (CKD-EPI) >60.00 mL/min >60.00    EST GFR IF AFRICAN AM >60.00 mL/min >60.00    GLUCOSE 74 - 106 mg/dL 159 High     CALCIUM, SERUM 8.7 - 10.4 mg/dL 9.1    ANION GAP 0.0 - 15.0 mmol/L 9.0    Resulting [facility]   Specimen Collected: 12/20/21       ALT 7 - 40 U/L 46 High     Comment: Interpret with caution, specimen slightly hemolyzed. Results may be affected   ALKALINE P'TASE 46 - 116 U/L 79    AST (SGOT) 13 - 40 U/L 54 High     PROTEIN TOTAL 5.7 - 8.2 g/dL 8.0    ALBUMIN 3.4 - 5.0 g/dL 4.4    BILIRUBIN-DIRECT <0.40 mg/dL 0.16    BILIRUBIN-TOTAL 0.3 - 1.2 mg/dL 0.7    Resulting [facility]   Specimen Collected: 12/20/21             The patient's records and results personally reviewed by this provider.     LABS/DIAGNOSTIC STUDIES to be collected on 3/18/22:  BMP, CBC, UA, COVID      Assessment      Babita Rivas is a 36 year old female was seen as a PAC referral for risk assessment and optimization for anesthesia.    Plan/Recommendations  Pt will be optimized for the proposed procedure.  See below for details on the assessment, risk, and preoperative recommendations    NEUROLOGY  - No history of TIA, CVA or seizure    ENT  - No current airway concerns.  Will need to be reassessed day of surgery.  Mallampati: Unable to assess  TM: > 3    CARDIAC  - No history of CAD, Hypertension and Afib  - METS (Metabolic  "Equivalents)  Patient performs 4 or more METS exercise without symptoms  Total Score: 0      RCRI-Very low risk: Class 1 0.4% complication rate  Total Score: 0        PULMONARY    HEATHER Low Risk  Total Score: 1           HEATHER: BMI over 35 kg/m2      - Exercised induced asthma, hasn't used inhaler seen a teenager    - Tobacco History      History   Smoking Status     Never Smoker   Smokeless Tobacco     Never Used     Comment: an occassional cigarette a long time ago       GI  - Denies GERD   - Hx gallstones    PONV Medium Risk  Total Score: 2           1 AN PONV: Pt is Female    1 AN PONV: Patient is not a current smoker        ENDOCRINE    - BMI: Estimated body mass index is 51.37 kg/m  as calculated from the following:    Height as of 10/5/21: 1.6 m (5' 3\").    Weight as of 10/5/21: 131.5 kg (290 lb).    - DM2, will hold Victoza evening prior to above procedure. Will continue metformin w/ dinner.    HEME  VTE Low Risk 0.26%  Total Score: 1           VTE: BMI greater than 39      - No history of abnormal bleeding or antiplatelet use.      MSK  Patient is NOT Frail  Total Score: 0          PSYCH  - Depression, PTSD        The patient is optimized for their procedure. AVS with information on surgery time/arrival time, meds and NPO status given by nursing staff. No further diagnostic testing indicated.    Please refer to the physical examination documented by the anesthesiologist in the anesthesia record on the day of surgery.    Video-Visit Details    Type of service:  Video Visit    Patient verbally consented to video service today: YES    Video Start Time: 0918  Video End Time (time video stopped): 0929    Originating Location (pt. Location): Home    Distant Location (provider location):  [facility] PREOPERATIVE ASSESSMENT CENTER     Mode of Communication:  Video Conference via Transerv  On the day of service:     Prep time: 14 minutes  Visit time: 11 minutes  Documentation time: 12 " minutes  ------------------------------------------  Total time: 37 minutes      Lauren Okeefe PA-C  Preoperative Assessment Center  [facility]  Clinic and Surgery Center  Phone: [phone]  Fax: [phone]

## 2022-03-01 NOTE — ADDENDUM NOTE
Addendum  created 03/01/22 1350 by Lauren Okeefe PA-C    Diagnosis association updated, Order list changed, Visit diagnoses modified

## 2022-03-01 NOTE — PATIENT INSTRUCTIONS
Preparing for Your Surgery      Name:  Babita Rivas   MRN:  [id number]   :  1985   Today's Date:  3/1/2022       Arriving for surgery:  Surgery date:  2022  Arrival time:  7:30 am    Restrictions due to COVID 19       Effective 22 [facility] is implementing the following visitor policy:     1 person may accompany the patient through the Pre-Op process.      That same person may wait in the Surgery Waiting room, provided there is enough room to social distance         Inpatients are allowed 2 visitors per day for the duration of their stay.        Visitors must wear a mask.      Visitors must not be ill.      Visiting hours are 8 am to 8 pm.    [facility] parking is available for anyone with mobility limitations or disabilities.  ([facility]  24 hours/ 7 days a week; [facility]  7 am- 3:30 pm, Mon- Fri)    Please come to:     [facility] [address]  [address] [place] [address]       -    Please proceed to [address] on the 3rd floor. [phone]?     - ?If you are in need of directions, wheelchair or escort please stop at the Information Desk in the lobby.       What can I eat or drink?  -  Follow diet restrictions as directed by surgeon   -  You may have clear liquids until 2 hours before surgery. (Until 7:30 am arrival time)    -  No Alcohol for at least 24 hours before surgery     Which medicines can I take?    Hold Aspirin for 7 days before surgery.   Hold Multivitamins for 7 days before surgery.  Hold Supplements for 7 days before surgery.  Hold Ibuprofen (Advil, Motrin) for 1 day before surgery  Hold Naproxen (Aleve) for 4 days before surgery.    Hold Victoza the evening before surgery       -  PLEASE TAKE these medications the day of surgery:  NONE      How do I prepare myself?  - Please take 2 showers before surgery using Scrubcare or Hibiclens soap.    Use this soap only from the neck to your toes.     Leave the soap  on your skin for one minute--then rinse thoroughly.      You may use your own shampoo and conditioner; no other hair products.   - Please remove all jewelry and body piercings.  - No lotions, deodorants or fragrance.  - No makeup or fingernail polish.   - Bring your ID and insurance card.    -If you have a Deep Brain Stimulator, Spinal Cord Stimulator or any neuro stimulator device---you must bring the remote control to the hospital     - All patients are required to have a Covid-19 test within 4 days of surgery/procedure.      -Patients will be contacted by the [facility] scheduling team within 1 week of surgery to make an appointment.      - Patients may call the Scheduling team at [phone] if they have not been scheduled within 4 days of  surgery.          Questions or Concerns:    - For any questions regarding the day of surgery or your hospital stay, please contact the Pre Admission Nursing Office at [phone].       - If you have health changes between today and your surgery please call your surgeon.       For questions after surgery please call your surgeons office.

## 2022-03-03 ENCOUNTER — VIRTUAL VISIT (OUTPATIENT)
Dept: ENDOCRINOLOGY | Facility: CLINIC | Age: 37
End: 2022-03-03
Payer: COMMERCIAL

## 2022-03-03 VITALS — BODY MASS INDEX: 50.66 KG/M2 | WEIGHT: 286 LBS

## 2022-03-03 DIAGNOSIS — E11.9 TYPE 2 DIABETES MELLITUS WITHOUT COMPLICATION, WITHOUT LONG-TERM CURRENT USE OF INSULIN (H): ICD-10-CM

## 2022-03-03 DIAGNOSIS — E55.9 VITAMIN D DEFICIENCY: ICD-10-CM

## 2022-03-03 DIAGNOSIS — Z71.3 NUTRITIONAL COUNSELING: Primary | ICD-10-CM

## 2022-03-03 DIAGNOSIS — E66.01 MORBID OBESITY (H): ICD-10-CM

## 2022-03-03 PROCEDURE — 97803 MED NUTRITION INDIV SUBSEQ: CPT | Mod: 95 | Performed by: DIETITIAN, REGISTERED

## 2022-03-03 NOTE — PROGRESS NOTES
"Babita Rivas is a 36 year old female who is being evaluated via a billable telephone visit.     The patient has been notified of following:     \"This telephone visit will be conducted via a call between you and your physician/provider. We have found that certain health care needs can be provided without the need for a physical exam.  This service lets us provide the care you need with a short phone conversation.  If a prescription is necessary we can send it directly to your pharmacy.  If lab work is needed we can place an order for that and you can then stop by our lab to have the test done at a later time.    Telephone visits are billed at different rates depending on your insurance coverage. During this emergency period, for some insurers they may be billed the same as an in-person visit.  Please reach out to your insurance provider with any questions.    If during the course of the call the physician/provider feels a telephone visit is not appropriate, you will not be charged for this service.\"    Patient has given verbal consent for Telephone visit?  Yes    How would you like to obtain your AVS? MyChart    Phone call duration: 22 minutes    During this virtual visit the patient is located in [place], patient verifies this as the location during the entirety of this visit.     Bariatric Nutrition Consultation Note    Reason For Visit: Nutrition Assessment    Babita Rivas is a 36 year old presenting today for bariatric nutrition consult and nutrition education regarding clear and low-fat full liquid diet for post-bariatric surgery.  Pt is interested in laparoscopic sleeve gastrectomy interested in laparoscopic sleeve gastrectomy with Dr. Fitzpatrick, tentatively scheduled for 3/22/22.     This is pt's 8th of 3rd required nutrition visits prior to surgery.      Pt referred by Arlette Corbett NP on February 15, 2021.    Patient with Co-morbidities of obesity including:  Type II DM yes, dx in May with A1C 6.5, now " "6.2 as of 9/8  Renal Failure no  Sleep apnea no  Hypertension no   Dyslipidemia no  Joint pain no  Back pain no  GERD no      ANTHROPOMETRICS:  Consult weight: 290 lbs with a BMI 52.26 kg/m2     Estimated body mass index is 50.66 kg/m  as calculated from the following:    Height as of 10/5/21: 1.6 m (5' 3\").    Weight as of this encounter: 129.7 kg (286 lb).    Current Weight: 286 lbs  Getting final weight checked tomorrow on the site.    Required weight loss goal pre-op: 10 lbs from initial consult weight (goal weight 280 lbs or less before surgery)    Medications:   Metformin   - Stomach pain if taking metformin on empty stomach, makes her want to eat more.     Victoza: recently started after being on topiramate previously     SUPPLEMENT INFORMATION:   Pt was recommended to take Vit D due to low Vit d. Labs done 5/11/21 showed Vit D of 9. Prescription for 50,000 IUs/week was sent to the pharmacy at that time. Was recommended to take 1,000 to 2,000 IUs/day following that dose      Getting vit D re-checked March 18th.      Prefers B12 injection post-op.     Diet History:  Pt reports no history of receiving clear and low-fat full liquid diet education after bariatric surgery in the past.  Plans to drink water and powerade zero day before surgery.     NUTRITION DIAGNOSIS:  Obesity r/t positive energy balance aeb BMI >30 kg/m2.  And  Food- and Nutrition-related knowledge deficit r/t lack of prior exposure to information AEB pt scheduled for upcoming bariatric surgery and pt interest in diet education/review    INTERVENTION:  Intervention Provided/Education Provided/Reviewed previous goals and encouraged patient to continue goals prior to surgery.     Provided instruction on bariatric clear and low-fat full liquid diets.  Provided the following handouts: Diet Guidelines for Bariatric Surgery, Sources of Protein, Keeping Track of Your Fluids, list of recommended vitamin/mineral supplementation after sleeve gastrectomy " surgery and RD contact information.     Patient Engagement: good    Goals after surgery:   1. Follow the bariatric post-op diet advancement schedule (see below)  2. Sip on 48-64 oz (or greater) fluids daily, recording intake to help stay on-track.  - Drink at least 1-2 oz of fluid every 15-30 min.  3. Stop vitamins/minerals 1 week before surgery. We will discuss starting a chewable multivitamin at the one week post-op visit.  4. Will need chewable MVI with iron and chewable calcium citrate for post op.  Take the following after a Sleeve Gastrectomy:  - Multivitamin/minerals: adult dose 1-2 times daily  Ideally want one that provides:   5,000+ international units vitamin A daily   800 mg oral folate daily  8+ mg zinc and 1+ mg copper daily  12+ mg Thiamin  - Calcium Citrate containing vitamin D: 5982-6399 mg/day     - Separate the calcium from your multivitamin or iron by at least 2 hours.     - Must be a chewable calcium citrate until post-op 3 months     - Options for calcium citrate: Dominik calcium citrate chewable, bariatric advantage calcium citrate chewable, Celebrate vitamins calcium citrate chewable, Bariatric Fusion calcium citrate chewable  - Vitamin D - at least 3,000 international units/day between all supplements    Protein shake examples:  Premier premier  Boost or Ensure Max protein  Slimfast  Muscle milk     Post-op Diet Advancement Schedule:  Clear Liquid Diet (stage 1): 3/21 - 3/22  *No RED/PURPLE, PROTEIN, or PULP day before surgery  Low-Fat Full Liquid Diet (stage 2): 3/23 - 3/29   Pureed Diet (stage 3): 3/30 - 4/12  Soft Diet (stage 4): 4/13 - 5/10  Regular Diet (stage 5): 5/11     Post-op Diet Handouts:  Diet Guidelines after Weight-loss Surgery  [email or web address]     Your Stage 1 Diet: Clear Liquids  [email or web address]     High Protein Clear Liquid options:  BiPro  Premier Protein clear  [id number]  Bone broth  Beneprotein protein powder mixed with 4-6 oz of  fluid  [id number]    Your Stage 2 Diet: Low-fat Full Liquids  [email or web address]     Your Stage 3 Diet: Pureed Foods  [email or web address]     Pureed Recipes  [email or web address]    Your Stage 4 Diet: Soft Foods  [email or web address]    Your Stage 5 Diet: Regular Foods  [email or web address]    Keeping Track of Fluids  [email or web address]    Follow up: 1 week post op   March 29th 2022 at 9:30 am    Time spent with patient: 22 minutes.  BUNNY Corral, RD, LD    [facility]  Dietetic Intern

## 2022-03-03 NOTE — LETTER
"3/3/2022       RE: Babita Rivas  [address]  [facility] [address]     Dear Colleague,    Thank you for referring your patient, Babita Rivas, to the [facility] [place] at [facility]. Please see a copy of my visit note below.    Babita Rivas is a 36 year old female who is being evaluated via a billable telephone visit.     The patient has been notified of following:     \"This telephone visit will be conducted via a call between you and your physician/provider. We have found that certain health care needs can be provided without the need for a physical exam.  This service lets us provide the care you need with a short phone conversation.  If a prescription is necessary we can send it directly to your pharmacy.  If lab work is needed we can place an order for that and you can then stop by our lab to have the test done at a later time.    Telephone visits are billed at different rates depending on your insurance coverage. During this emergency period, for some insurers they may be billed the same as an in-person visit.  Please reach out to your insurance provider with any questions.    If during the course of the call the physician/provider feels a telephone visit is not appropriate, you will not be charged for this service.\"    Patient has given verbal consent for Telephone visit?  Yes    How would you like to obtain your AVS? Candy    Phone call duration: 22 minutes    During this virtual visit the patient is located in [place], patient verifies this as the location during the entirety of this visit.     Bariatric Nutrition Consultation Note    Reason For Visit: Nutrition Assessment    Babita Rivas is a 36 year old presenting today for bariatric nutrition consult and nutrition education regarding clear and low-fat full liquid diet for post-bariatric surgery.  Pt is interested in laparoscopic sleeve " "gastrectomy interested in laparoscopic sleeve gastrectomy with Dr. Fitzpatrick, tentatively scheduled for 3/22/22.     This is pt's 8th of 3rd required nutrition visits prior to surgery.      Pt referred by Arlette Corbett NP on February 15, 2021.    Patient with Co-morbidities of obesity including:  Type II DM yes, dx in May with A1C 6.5, now 6.2 as of 9/8  Renal Failure no  Sleep apnea no  Hypertension no   Dyslipidemia no  Joint pain no  Back pain no  GERD no      ANTHROPOMETRICS:  Consult weight: 290 lbs with a BMI 52.26 kg/m2     Estimated body mass index is 50.66 kg/m  as calculated from the following:    Height as of 10/5/21: 1.6 m (5' 3\").    Weight as of this encounter: 129.7 kg (286 lb).    Current Weight: 286 lbs  Getting final weight checked tomorrow on the site.    Required weight loss goal pre-op: 10 lbs from initial consult weight (goal weight 280 lbs or less before surgery)    Medications:   Metformin   - Stomach pain if taking metformin on empty stomach, makes her want to eat more.     Victoza: recently started after being on topiramate previously     SUPPLEMENT INFORMATION:   Pt was recommended to take Vit D due to low Vit d. Labs done 5/11/21 showed Vit D of 9. Prescription for 50,000 IUs/week was sent to the pharmacy at that time. Was recommended to take 1,000 to 2,000 IUs/day following that dose      Getting vit D re-checked March 18th.      Prefers B12 injection post-op.     Diet History:  Pt reports no history of receiving clear and low-fat full liquid diet education after bariatric surgery in the past.  Plans to drink water and powerade zero day before surgery.     NUTRITION DIAGNOSIS:  Obesity r/t positive energy balance aeb BMI >30 kg/m2.  And  Food- and Nutrition-related knowledge deficit r/t lack of prior exposure to information AEB pt scheduled for upcoming bariatric surgery and pt interest in diet education/review    INTERVENTION:  Intervention Provided/Education Provided/Reviewed previous goals " and encouraged patient to continue goals prior to surgery.     Provided instruction on bariatric clear and low-fat full liquid diets.  Provided the following handouts: Diet Guidelines for Bariatric Surgery, Sources of Protein, Keeping Track of Your Fluids, list of recommended vitamin/mineral supplementation after sleeve gastrectomy surgery and RD contact information.     Patient Engagement: good    Goals after surgery:   1. Follow the bariatric post-op diet advancement schedule (see below)  2. Sip on 48-64 oz (or greater) fluids daily, recording intake to help stay on-track.  - Drink at least 1-2 oz of fluid every 15-30 min.  3. Stop vitamins/minerals 1 week before surgery. We will discuss starting a chewable multivitamin at the one week post-op visit.  4. Will need chewable MVI with iron and chewable calcium citrate for post op.  Take the following after a Sleeve Gastrectomy:  - Multivitamin/minerals: adult dose 1-2 times daily  Ideally want one that provides:   5,000+ international units vitamin A daily   800 mg oral folate daily  8+ mg zinc and 1+ mg copper daily  12+ mg Thiamin  - Calcium Citrate containing vitamin D: 8079-1650 mg/day     - Separate the calcium from your multivitamin or iron by at least 2 hours.     - Must be a chewable calcium citrate until post-op 3 months     - Options for calcium citrate: Dominik calcium citrate chewable, bariatric advantage calcium citrate chewable, Celebrate vitamins calcium citrate chewable, Bariatric Fusion calcium citrate chewable  - Vitamin D - at least 3,000 international units/day between all supplements    Protein shake examples:  Premier premier  Boost or Ensure Max protein  Slimfast  Muscle milk     Post-op Diet Advancement Schedule:  Clear Liquid Diet (stage 1): 3/21 - 3/22  *No RED/PURPLE, PROTEIN, or PULP day before surgery  Low-Fat Full Liquid Diet (stage 2): 3/23 - 3/29   Pureed Diet (stage 3): 3/30 - 4/12  Soft Diet (stage 4): 4/13 - 5/10  Regular Diet (stage  5): 5/11     Post-op Diet Handouts:  Diet Guidelines after Weight-loss Surgery  [email or web address]     Your Stage 1 Diet: Clear Liquids  [email or web address]     High Protein Clear Liquid options:  BiPro  Premier Protein clear  [id number]  Bone broth  Beneprotein protein powder mixed with 4-6 oz of fluid  [email or web address]    Your Stage 2 Diet: Low-fat Full Liquids  [email or web address]     Your Stage 3 Diet: Pureed Foods  [email or web address]     Pureed Recipes  [email or web address]    Your Stage 4 Diet: Soft Foods  [email or web address]    Your Stage 5 Diet: Regular Foods  [email or web address]    Keeping Track of Fluids  [email or web address]    Follow up: 1 week post op   March 29th 2022 at 9:30 am    Time spent with patient: 22 minutes.  HILARIO Corral-D, RD, LD    [facility]  Dietetic Intern

## 2022-03-03 NOTE — PATIENT INSTRUCTIONS
Goals after surgery:   1. Follow the bariatric post-op diet advancement schedule (see below)  2. Sip on 48-64 oz (or greater) fluids daily, recording intake to help stay on-track.  - Drink at least 1-2 oz of fluid every 15-30 min.  3. Stop vitamins/minerals 1 week before surgery. We will discuss starting a chewable multivitamin at the one week post-op visit.  4. Will need chewable MVI with iron and chewable calcium citrate for post op.  Take the following after a Sleeve Gastrectomy:  - Multivitamin/minerals: adult dose 1-2 times daily  Ideally want one that provides:   5,000+ international units vitamin A daily   800 mg oral folate daily  8+ mg zinc and 1+ mg copper daily  12+ mg Thiamin  - Calcium Citrate containing vitamin D: 0092-5438 mg/day     - Separate the calcium from your multivitamin or iron by at least 2 hours.     - Must be a chewable calcium citrate until post-op 3 months     - Options for calcium citrate: Dominik calcium citrate chewable, bariatric advantage calcium citrate chewable, Celebrate vitamins calcium citrate chewable, Bariatric Fusion calcium citrate chewable  - Vitamin D - at least 3,000 international units/day between all supplements    Protein shake examples:  Premier premier  Boost or Ensure Max protein  Slimfast  Muscle milk     Post-op Diet Advancement Schedule:  Clear Liquid Diet (stage 1): 3/21 - 3/22  *No RED/PURPLE, PROTEIN, or PULP day before surgery  Low-Fat Full Liquid Diet (stage 2): 3/23 - 3/29   Pureed Diet (stage 3): 3/30 - 4/12  Soft Diet (stage 4): 4/13 - 5/10  Regular Diet (stage 5): 5/11     Post-op Diet Handouts:  Diet Guidelines after Weight-loss Surgery  [email or web address]     Your Stage 1 Diet: Clear Liquids  [email or web address]     High Protein Clear Liquid options:  BiPro  Premier Protein clear  [id number]  Bone broth  Beneprotein protein powder mixed with 4-6 oz of fluid  [id number]    Your Stage 2 Diet: Low-fat Full  Liquids  [email or web address]     Your Stage 3 Diet: Pureed Foods  [email or web address]     Pureed Recipes  [email or web address]    Your Stage 4 Diet: Soft Foods  [email or web address]    Your Stage 5 Diet: Regular Foods  [email or web address]    Keeping Track of Fluids  [email or web address]    Follow up: 1 week post op   March 29th 2022 at 9:30 am    BUNNY Rivera, RD, LD  Clinic #: [phone]    [facility]  Dietetic Intern

## 2022-03-04 ENCOUNTER — ALLIED HEALTH/NURSE VISIT (OUTPATIENT)
Dept: ENDOCRINOLOGY | Facility: CLINIC | Age: 37
End: 2022-03-04
Payer: COMMERCIAL

## 2022-03-04 ENCOUNTER — TELEPHONE (OUTPATIENT)
Dept: ENDOCRINOLOGY | Facility: CLINIC | Age: 37
End: 2022-03-04

## 2022-03-04 VITALS
WEIGHT: 291 LBS | SYSTOLIC BLOOD PRESSURE: 139 MMHG | HEART RATE: 83 BPM | BODY MASS INDEX: 51.55 KG/M2 | DIASTOLIC BLOOD PRESSURE: 85 MMHG

## 2022-03-04 DIAGNOSIS — E66.01 MORBID OBESITY (H): Primary | ICD-10-CM

## 2022-03-04 PROCEDURE — 99207 PR NO CHARGE NURSE ONLY: CPT

## 2022-03-04 RX ORDER — AMOXICILLIN 250 MG
2 CAPSULE ORAL DAILY PRN
Qty: 30 TABLET | Refills: 1 | Status: SHIPPED | OUTPATIENT
Start: 2022-03-04 | End: 2022-04-26

## 2022-03-04 RX ORDER — ONDANSETRON 4 MG/1
4 TABLET, ORALLY DISINTEGRATING ORAL EVERY 8 HOURS PRN
Qty: 15 TABLET | Refills: 0 | Status: SHIPPED | OUTPATIENT
Start: 2022-03-04 | End: 2022-12-23

## 2022-03-04 RX ORDER — HYOSCYAMINE SULFATE 0.125 MG
0.12 TABLET ORAL EVERY 4 HOURS PRN
Qty: 30 TABLET | Refills: 1 | Status: SHIPPED | OUTPATIENT
Start: 2022-03-04 | End: 2022-03-29

## 2022-03-04 ASSESSMENT — PAIN SCALES - GENERAL: PAINLEVEL: NO PAIN (0)

## 2022-03-04 NOTE — PATIENT INSTRUCTIONS
Babita Rivas,    Below is a recap of our conversation regarding your upcoming Sleeve Gastrectomy on 3/22/22.    DAY BEFORE YOUR SURGERY     - Follow a clear liquid diet.  Stay away from red liquids and liquids with pulp.     - Ensure you are well hydrated all day!     - Nothing to eat after midnight.  You may have clear liquids up to 3 hours before your surgery.      - Take your medications as directed from the PAC clinic.    SHOWER    - Follow instructions for pre-op shower using the required soap (chlorhexidine).      - You will take a shower the night before surgery and a shower the morning of surgery.  The soap can be very drying.  Do not put lotion on.    TRANSPORTATION & CARE    - You will need a  to take you to the hospital the day of surgery.    - You will need a  to pick you up from the hospital the day of discharge (usually the afternoon/evening the day after surgery).    - You will need someone to stay with you for the first 1-2 days after surgery, especially if you are taking prescription pain medicine.      AFTER SURGERY    MEDICATIONS     - Depending on the size and number of medications you take, you may need to space/change the time you take your medication so you do not overfill your stomach.   - Make sure you follow-up with your primary provider to make medication changes needed.   - If you have diabetes, follow-up with your provider that orders your diabetes medications and check your blood sugar regularly.      You will need to  prescriptions for the first four medications from your pharmacy BEFORE your surgery day.  Have these at home for when you are discharged.      Prilosec (omeprazole): This medication is for control of stomach acid.  Open the capsule and put in water, protein shake or other approve liquid.  Take as directed.  (Please disregard the prescription bottle instructions to not open the capsule).  If you are currently on a medication for GERD or Reflux,  you will just continue that medication.    Levsin (hyoscyamine): This medication is to help control spasms/cramping in the stomach and intestines.  Take as needed for cramping.      Zofran (ondansetron):  This medication is for nausea.  The medication is to be placed on the tongue and allowed to dissolve. Take as directed for nausea.    Senna: This medication is a stool softener:  Take as directed.  You may cut it in half to make it easier to swallow.  It is important to take this medication if you are taking a narcotic pain medicine as the pain medication can be constipating.  Senna can be purchased over-the-counter.      Narcotic pain medicine: Take as directed for pain.  If you are not having a lot of pain, you can take Tylenol or Extra Strength Tylenol per the bottle instructions.  The pain medicine can cause constipation.  Do not drive while taking narcotic pain medication.  This prescription will be provided to you at the time of discharge.      PAIN CONTROL    - Take your pain medicine as needed, as directed.    - You can use Tylenol or Tylenol Extra Strength if you are not having a lot of pain and also to supplement during the day.  DO NOT TAKE NSAIDS: IBUPROFEN, ASPIRIN OR NAPROXEN.    - Use an ice pack on the incisions for the first 24 - 48 hours:  Use a barrier between the ice pack and the skin.  Do not leave the ice on longer than 20 minutes at each time.      - Change positions frequently.  Walking around once an hour will also help.    - You may also try a heating pad on your abdomen to help soothe cramping.  Use a barrier between the heating pad and your skin.  Do not leave on longer than 20 minutes at each time.    HYDRATION    - You will be provided with a small medicine cup after surgery.  It holds one ounce of fluid (30 mL).  You need to drink one of these (1 oz) every 10 to 15 .    - Your goal is 48 to 64 ounces each day.  This amount will help prevent dehydration.      - Keep a tally sheet next  to you and prosper each time you drink one ounce of fluid.  You should have at least 4-6 marks for each hour.    - Try keeping a water bottle by your bed.  Drink a little before going to sleep, if you wake in the middle of the night, and in the morning before getting out of bed.    - Keep an eye on your urine.  It is a good indicator of your hydration status.  Your urine should be clear yellow or clear light yellow.      DIET    For Dr. Fitzpatrick Patients:     - You will be following the Stage 2 - Full Liquid diet upon discharge.  Ensure you have a variety of proper full liquids at home to support you in taking in the proper nutrition.     - Please refer to the Stage 2 - Full Liquid diet handout provided by the Dietician.    BREATHING AND ACTIVITY    - Use your incentive spirometer each hour while awake.  Sitting up or standing, take 5 - 10 slow, deep breaths each time, focusing on expanding your lungs.  This should be done for the first couple of weeks after surgery.    - Get up and walk around each hour while you are awake - at least 10 minutes.  Walking will help with circulation, bowel regulation and will help you feel better.    -  Do not lift anything greater than 10 lb for the first 4 weeks.    WOUND  CARE  You may have surgical glue or steri-strips over your incision after surgery.     - Steri-Strips: Adhesive strips of tape over your incisions.  They will fall off over the first 7-10 days after surgery.    Showering: you may shower the day you get home unless your surgeon tells you differently.    - Wash gently around incisions with warm soapy water, rinse well, and gently pat dry.    - No tub baths until all incisions are healed.      FOLLOW-UP CALL    - You will receive a post-op phone call two days after surgery to check in and see how you are doing (If your surgery is on a Friday, your phone call will be on the Monday following your surgery).  You can always send us a message via [employer]) and the Get Well Loop  sam.    GET WELL LOOP  [email or web address]    - Track your fluid intake!    - Reminders to set up follow up appointments.    - Communicate with nursing staff.    - Please use [employer] for any concerns regarding your health.    Please let us know if you have any additional questions.    Sincerely,    Sandrine Hilliard RN, BSN  RN Care Coordinator  Bariatric Surgery and Comprehensive Weight Management  Phone: [phone]

## 2022-03-04 NOTE — PROGRESS NOTES
This patient is having sleeve gastrectomy by Dr. Fitzpatrick  03/22/22.    The following handouts were reviewed with the patient :  Before Your Surgery, Patient Checklist, Weight Loss Surgery Pre-operative Class, Preop Recommendations Quick Reference Guide, History and Physical, Medications to Avoid, Shower or Bathing Before Surgery, Bowel Preparation, Powerful Choices and [place] [facility] Directive.  Questions were addressed and understanding of content was verbalized.  Contact information was provided.    Patient goal weight: 280  Weight today: 291    Call [phone] Dillon Batista to confirm surgery date.     Call [phone] to schedule your pre-operative history and physical with our PAC clinic.      Patient currently taking opioid/narcotic pain medications? No.     Patient is going to start 3 shakes per day on March 6th.  Will weigh herself 3/6, 3/11 and 3/16 and will send her weight in.

## 2022-03-10 ENCOUNTER — TELEPHONE (OUTPATIENT)
Dept: ENDOCRINOLOGY | Facility: CLINIC | Age: 37
End: 2022-03-10
Payer: COMMERCIAL

## 2022-03-10 NOTE — TELEPHONE ENCOUNTER
Patient requesting a new pharmacy Pt has questions about medications and upcoming surg    [phone]

## 2022-03-10 NOTE — TELEPHONE ENCOUNTER
Called pt to check in on pre-op diet and weight loss.     Pt has been doing 3 shakes/day.    Hard to know how much weight she is losing due to differences in scales per pt - 287 at home vs 291 on site.    Pt also asked about medications prescribed at her 3/4/22 visit and was wondering if they were for now or post op. Informed pt that these medications are for post op.     BUNNY Rivera, RD, LD  Clinic #: [phone]

## 2022-03-18 ENCOUNTER — LAB (OUTPATIENT)
Dept: URGENT CARE | Facility: URGENT CARE | Age: 37
End: 2022-03-18
Payer: COMMERCIAL

## 2022-03-18 ENCOUNTER — LAB (OUTPATIENT)
Dept: LAB | Facility: CLINIC | Age: 37
End: 2022-03-18
Payer: COMMERCIAL

## 2022-03-18 DIAGNOSIS — E66.01 MORBID OBESITY (H): ICD-10-CM

## 2022-03-18 DIAGNOSIS — Z01.818 PREOP TESTING: ICD-10-CM

## 2022-03-18 DIAGNOSIS — Z01.818 PREOP EXAMINATION: ICD-10-CM

## 2022-03-18 LAB
ALBUMIN UR-MCNC: NEGATIVE MG/DL
ANION GAP SERPL CALCULATED.3IONS-SCNC: 8 MMOL/L (ref 3–14)
APPEARANCE UR: CLEAR
BACTERIA #/AREA URNS HPF: ABNORMAL /HPF
BASOPHILS # BLD AUTO: 0.1 10E3/UL (ref 0–0.2)
BASOPHILS NFR BLD AUTO: 1 %
BILIRUB UR QL STRIP: NEGATIVE
BUN SERPL-MCNC: 13 MG/DL (ref 7–30)
CALCIUM SERPL-MCNC: 9.5 MG/DL (ref 8.5–10.1)
CHLORIDE BLD-SCNC: 106 MMOL/L (ref 94–109)
CO2 SERPL-SCNC: 23 MMOL/L (ref 20–32)
COLOR UR AUTO: YELLOW
CREAT SERPL-MCNC: 0.76 MG/DL (ref 0.52–1.04)
EOSINOPHIL # BLD AUTO: 0.3 10E3/UL (ref 0–0.7)
EOSINOPHIL NFR BLD AUTO: 3 %
ERYTHROCYTE [DISTWIDTH] IN BLOOD BY AUTOMATED COUNT: 12.4 % (ref 10–15)
GFR SERPL CREATININE-BSD FRML MDRD: >90 ML/MIN/1.73M2
GLUCOSE BLD-MCNC: 99 MG/DL (ref 70–99)
GLUCOSE UR STRIP-MCNC: NEGATIVE MG/DL
HCT VFR BLD AUTO: 42.6 % (ref 35–47)
HGB BLD-MCNC: 14.1 G/DL (ref 11.7–15.7)
HGB UR QL STRIP: ABNORMAL
IMM GRANULOCYTES # BLD: 0 10E3/UL
IMM GRANULOCYTES NFR BLD: 0 %
KETONES UR STRIP-MCNC: ABNORMAL MG/DL
LEUKOCYTE ESTERASE UR QL STRIP: NEGATIVE
LYMPHOCYTES # BLD AUTO: 5.9 10E3/UL (ref 0.8–5.3)
LYMPHOCYTES NFR BLD AUTO: 52 %
MCH RBC QN AUTO: 29.4 PG (ref 26.5–33)
MCHC RBC AUTO-ENTMCNC: 33.1 G/DL (ref 31.5–36.5)
MCV RBC AUTO: 89 FL (ref 78–100)
MONOCYTES # BLD AUTO: 0.8 10E3/UL (ref 0–1.3)
MONOCYTES NFR BLD AUTO: 7 %
NEUTROPHILS # BLD AUTO: 4.2 10E3/UL (ref 1.6–8.3)
NEUTROPHILS NFR BLD AUTO: 37 %
NITRATE UR QL: NEGATIVE
NRBC # BLD AUTO: 0 10E3/UL
NRBC BLD AUTO-RTO: 0 /100
PH UR STRIP: 6 [PH] (ref 5–7)
PLAT MORPH BLD: NORMAL
PLATELET # BLD AUTO: 286 10E3/UL (ref 150–450)
POTASSIUM BLD-SCNC: 3.5 MMOL/L (ref 3.4–5.3)
RBC # BLD AUTO: 4.79 10E6/UL (ref 3.8–5.2)
RBC #/AREA URNS AUTO: ABNORMAL /HPF
RBC MORPH BLD: NORMAL
SARS-COV-2 RNA RESP QL NAA+PROBE: NEGATIVE
SODIUM SERPL-SCNC: 137 MMOL/L (ref 133–144)
SP GR UR STRIP: >=1.03 (ref 1–1.03)
SQUAMOUS #/AREA URNS AUTO: ABNORMAL /LPF
UROBILINOGEN UR STRIP-ACNC: 0.2 E.U./DL
WBC # BLD AUTO: 11.3 10E3/UL (ref 4–11)
WBC #/AREA URNS AUTO: ABNORMAL /HPF

## 2022-03-18 PROCEDURE — 80048 BASIC METABOLIC PNL TOTAL CA: CPT

## 2022-03-18 PROCEDURE — U0003 INFECTIOUS AGENT DETECTION BY NUCLEIC ACID (DNA OR RNA); SEVERE ACUTE RESPIRATORY SYNDROME CORONAVIRUS 2 (SARS-COV-2) (CORONAVIRUS DISEASE [COVID-19]), AMPLIFIED PROBE TECHNIQUE, MAKING USE OF HIGH THROUGHPUT TECHNOLOGIES AS DESCRIBED BY CMS-2020-01-R: HCPCS

## 2022-03-18 PROCEDURE — 36415 COLL VENOUS BLD VENIPUNCTURE: CPT

## 2022-03-18 PROCEDURE — 85025 COMPLETE CBC W/AUTO DIFF WBC: CPT

## 2022-03-18 PROCEDURE — U0005 INFEC AGEN DETEC AMPLI PROBE: HCPCS

## 2022-03-18 PROCEDURE — 81001 URINALYSIS AUTO W/SCOPE: CPT

## 2022-03-21 ENCOUNTER — ANESTHESIA EVENT (OUTPATIENT)
Dept: SURGERY | Facility: CLINIC | Age: 37
End: 2022-03-21
Payer: COMMERCIAL

## 2022-03-21 NOTE — ANESTHESIA PREPROCEDURE EVALUATION
Anesthesia Pre-Procedure Evaluation    Patient: Babita Rivas   MRN: [id number] : 1985        Procedure :   Video Visit       Past Medical History:   Diagnosis Date     Depression      Diabetes mellitus, type 2 (H)      Exercise-induced asthma      Morbid obesity (H)      PTSD (post-traumatic stress disorder)       Past Surgical History:   Procedure Laterality Date      SECTION      x 2     ESWL FOR GALLSTONES       TONSILLECTOMY       TUBAL LIGATION        No Known Allergies   Social History     Tobacco Use     Smoking status: Never Smoker     Smokeless tobacco: Never Used     Tobacco comment: an occassional cigarette a long time ago   Substance Use Topics     Alcohol use: Not on file      Wt Readings from Last 1 Encounters:   22 132 kg (291 lb)        Anesthesia Evaluation            ROS/MED HX  ENT/Pulmonary:     (+) Intermittent, asthma     Neurologic:       Cardiovascular:       METS/Exercise Tolerance:     Hematologic:       Musculoskeletal:       GI/Hepatic:       Renal/Genitourinary:       Endo:     (+) type II DM, Not using insulin, Obesity,     Psychiatric/Substance Use:     (+) psychiatric history depression and other (comment) (PTSD, OCD)     Infectious Disease:       Malignancy:       Other:            Physical Exam    Airway        Mallampati: I   TM distance: > 3 FB   Neck ROM: full     Respiratory Devices and Support         Dental  no notable dental history         Cardiovascular   cardiovascular exam normal          Pulmonary   pulmonary exam normal                OUTSIDE LABS:  CBC:   Lab Results   Component Value Date    WBC 11.3 (H) 2022    WBC 13.0 (H) 2021    HGB 14.1 2022    HGB 14.1 2021    HCT 42.6 2022    HCT 43.9 2021     2022     2021     BMP:   Lab Results   Component Value Date     2022     2021    POTASSIUM 3.5 2022    POTASSIUM 3.9 2021    CHLORIDE 106  03/18/2022    CHLORIDE 109 05/11/2021    CO2 23 03/18/2022    CO2 23 05/11/2021    BUN 13 03/18/2022    BUN 10 05/11/2021    CR 0.76 03/18/2022    CR 1.00 05/11/2021    GLC 99 03/18/2022     (H) 05/11/2021     COAGS: No results found for: PTT, INR, FIBR  POC: No results found for: BGM, HCG, HCGS  HEPATIC:   Lab Results   Component Value Date    ALBUMIN 3.8 05/11/2021    PROTTOTAL 7.5 05/11/2021    ALT 63 (H) 05/11/2021    AST 30 05/11/2021    ALKPHOS 69 05/11/2021    BILITOTAL 0.5 05/11/2021     OTHER:   Lab Results   Component Value Date    A1C 6.5 (H) 05/11/2021    CHEO 9.5 03/18/2022    TSH 0.86 05/11/2021       Anesthesia Plan    ASA Status:  3   NPO Status:  NPO Appropriate    Anesthesia Type: General.     - Airway: ETT   Induction: Intravenous.   Maintenance: Balanced.        Consents    Anesthesia Plan(s) and associated risks, benefits, and realistic alternatives discussed. Questions answered and patient/representative(s) expressed understanding.     - Discussed: Risks, Benefits and Alternatives for BOTH SEDATION and the PROCEDURE were discussed     - Discussed with:  Patient      - Extended Intubation/Ventilatory Support Discussed: No.      - Patient is DNR/DNI Status: No    Use of blood products discussed: No .     Postoperative Care    Pain management: IV analgesics.   PONV prophylaxis: Ondansetron (or other 5HT-3), Dexamethasone or Solumedrol     Comments:           H&P reviewed: Unable to attach H&P to encounter due to EHR limitations. H&P Update: appropriate H&P reviewed, patient examined. No interval changes since H&P (within 30 days).             Eddie Morley MD

## 2022-03-22 ENCOUNTER — HOSPITAL ENCOUNTER (INPATIENT)
Facility: CLINIC | Age: 37
LOS: 1 days | Discharge: HOME | End: 2022-03-23
Attending: SURGERY | Admitting: SURGERY
Payer: COMMERCIAL

## 2022-03-22 ENCOUNTER — ANESTHESIA (OUTPATIENT)
Dept: SURGERY | Facility: CLINIC | Age: 37
End: 2022-03-22
Payer: COMMERCIAL

## 2022-03-22 DIAGNOSIS — E66.01 MORBID OBESITY (H): ICD-10-CM

## 2022-03-22 DIAGNOSIS — Z98.84 S/P LAPAROSCOPIC SLEEVE GASTRECTOMY: Primary | ICD-10-CM

## 2022-03-22 LAB
CREAT SERPL-MCNC: 0.69 MG/DL (ref 0.52–1.04)
GFR SERPL CREATININE-BSD FRML MDRD: >90 ML/MIN/1.73M2
GLUCOSE BLDC GLUCOMTR-MCNC: 116 MG/DL (ref 70–99)
GLUCOSE BLDC GLUCOMTR-MCNC: 94 MG/DL (ref 70–99)
HBA1C MFR BLD: 5.6 % (ref 0–5.6)

## 2022-03-22 PROCEDURE — 250N000009 HC RX 250: Performed by: SURGERY

## 2022-03-22 PROCEDURE — 250N000011 HC RX IP 250 OP 636: Performed by: SURGERY

## 2022-03-22 PROCEDURE — 370N000017 HC ANESTHESIA TECHNICAL FEE, PER MIN: Performed by: SURGERY

## 2022-03-22 PROCEDURE — 250N000009 HC RX 250: Performed by: NURSE ANESTHETIST, CERTIFIED REGISTERED

## 2022-03-22 PROCEDURE — 88342 IMHCHEM/IMCYTCHM 1ST ANTB: CPT | Mod: TC | Performed by: SURGERY

## 2022-03-22 PROCEDURE — 250N000011 HC RX IP 250 OP 636: Performed by: ANESTHESIOLOGY

## 2022-03-22 PROCEDURE — 710N000010 HC RECOVERY PHASE 1, LEVEL 2, PER MIN: Performed by: SURGERY

## 2022-03-22 PROCEDURE — 250N000011 HC RX IP 250 OP 636: Performed by: NURSE ANESTHETIST, CERTIFIED REGISTERED

## 2022-03-22 PROCEDURE — 120N000002 HC R&B MED SURG/OB UMMC

## 2022-03-22 PROCEDURE — 250N000011 HC RX IP 250 OP 636: Performed by: STUDENT IN AN ORGANIZED HEALTH CARE EDUCATION/TRAINING PROGRAM

## 2022-03-22 PROCEDURE — 82565 ASSAY OF CREATININE: CPT | Performed by: STUDENT IN AN ORGANIZED HEALTH CARE EDUCATION/TRAINING PROGRAM

## 2022-03-22 PROCEDURE — 999N000141 HC STATISTIC PRE-PROCEDURE NURSING ASSESSMENT: Performed by: SURGERY

## 2022-03-22 PROCEDURE — 250N000013 HC RX MED GY IP 250 OP 250 PS 637: Performed by: SURGERY

## 2022-03-22 PROCEDURE — 258N000003 HC RX IP 258 OP 636: Performed by: NURSE ANESTHETIST, CERTIFIED REGISTERED

## 2022-03-22 PROCEDURE — 258N000003 HC RX IP 258 OP 636: Performed by: STUDENT IN AN ORGANIZED HEALTH CARE EDUCATION/TRAINING PROGRAM

## 2022-03-22 PROCEDURE — 88342 IMHCHEM/IMCYTCHM 1ST ANTB: CPT | Mod: 26 | Performed by: PATHOLOGY

## 2022-03-22 PROCEDURE — 36415 COLL VENOUS BLD VENIPUNCTURE: CPT | Performed by: STUDENT IN AN ORGANIZED HEALTH CARE EDUCATION/TRAINING PROGRAM

## 2022-03-22 PROCEDURE — 360N000077 HC SURGERY LEVEL 4, PER MIN: Performed by: SURGERY

## 2022-03-22 PROCEDURE — 0DB64Z3 EXCISION OF STOMACH, PERCUTANEOUS ENDOSCOPIC APPROACH, VERTICAL: ICD-10-PCS | Performed by: SURGERY

## 2022-03-22 PROCEDURE — 88305 TISSUE EXAM BY PATHOLOGIST: CPT | Mod: 26 | Performed by: PATHOLOGY

## 2022-03-22 PROCEDURE — 99207 PR NO BILLABLE SERVICE THIS VISIT: CPT | Performed by: NURSE PRACTITIONER

## 2022-03-22 PROCEDURE — 250N000013 HC RX MED GY IP 250 OP 250 PS 637: Performed by: ANESTHESIOLOGY

## 2022-03-22 PROCEDURE — 250N000009 HC RX 250: Performed by: ANESTHESIOLOGY

## 2022-03-22 PROCEDURE — 272N000002 HC OR SUPPLY OTHER OPNP: Performed by: SURGERY

## 2022-03-22 PROCEDURE — 250N000025 HC SEVOFLURANE, PER MIN: Performed by: SURGERY

## 2022-03-22 PROCEDURE — 272N000001 HC OR GENERAL SUPPLY STERILE: Performed by: SURGERY

## 2022-03-22 PROCEDURE — 250N000013 HC RX MED GY IP 250 OP 250 PS 637: Performed by: STUDENT IN AN ORGANIZED HEALTH CARE EDUCATION/TRAINING PROGRAM

## 2022-03-22 PROCEDURE — 83036 HEMOGLOBIN GLYCOSYLATED A1C: CPT | Performed by: NURSE PRACTITIONER

## 2022-03-22 PROCEDURE — 36415 COLL VENOUS BLD VENIPUNCTURE: CPT | Performed by: NURSE PRACTITIONER

## 2022-03-22 PROCEDURE — 43775 LAP SLEEVE GASTRECTOMY: CPT | Performed by: SURGERY

## 2022-03-22 RX ORDER — MAGNESIUM SULFATE HEPTAHYDRATE 40 MG/ML
2 INJECTION, SOLUTION INTRAVENOUS ONCE
Status: COMPLETED | OUTPATIENT
Start: 2022-03-22 | End: 2022-03-22

## 2022-03-22 RX ORDER — DIPHENHYDRAMINE HYDROCHLORIDE 50 MG/ML
25 INJECTION INTRAMUSCULAR; INTRAVENOUS EVERY 6 HOURS PRN
Status: DISCONTINUED | OUTPATIENT
Start: 2022-03-22 | End: 2022-03-23 | Stop reason: HOSPADM

## 2022-03-22 RX ORDER — SODIUM CHLORIDE, SODIUM LACTATE, POTASSIUM CHLORIDE, CALCIUM CHLORIDE 600; 310; 30; 20 MG/100ML; MG/100ML; MG/100ML; MG/100ML
INJECTION, SOLUTION INTRAVENOUS CONTINUOUS
Status: DISCONTINUED | OUTPATIENT
Start: 2022-03-22 | End: 2022-03-23 | Stop reason: HOSPADM

## 2022-03-22 RX ORDER — HYOSCYAMINE SULFATE 0.125 MG
125 TABLET ORAL EVERY 4 HOURS PRN
Status: DISCONTINUED | OUTPATIENT
Start: 2022-03-22 | End: 2022-03-23 | Stop reason: HOSPADM

## 2022-03-22 RX ORDER — OXYCODONE HYDROCHLORIDE 10 MG/1
10 TABLET ORAL
Status: DISCONTINUED | OUTPATIENT
Start: 2022-03-22 | End: 2022-03-23 | Stop reason: HOSPADM

## 2022-03-22 RX ORDER — PROCHLORPERAZINE MALEATE 5 MG
10 TABLET ORAL EVERY 6 HOURS PRN
Status: DISCONTINUED | OUTPATIENT
Start: 2022-03-22 | End: 2022-03-23 | Stop reason: HOSPADM

## 2022-03-22 RX ORDER — LIDOCAINE 40 MG/G
CREAM TOPICAL
Status: DISCONTINUED | OUTPATIENT
Start: 2022-03-22 | End: 2022-03-22 | Stop reason: HOSPADM

## 2022-03-22 RX ORDER — ONDANSETRON 4 MG/1
4 TABLET, ORALLY DISINTEGRATING ORAL EVERY 6 HOURS PRN
Status: DISCONTINUED | OUTPATIENT
Start: 2022-03-22 | End: 2022-03-23 | Stop reason: HOSPADM

## 2022-03-22 RX ORDER — HYDROMORPHONE HCL IN WATER/PF 6 MG/30 ML
0.2 PATIENT CONTROLLED ANALGESIA SYRINGE INTRAVENOUS
Status: DISCONTINUED | OUTPATIENT
Start: 2022-03-22 | End: 2022-03-23 | Stop reason: HOSPADM

## 2022-03-22 RX ORDER — SCOLOPAMINE TRANSDERMAL SYSTEM 1 MG/1
1 PATCH, EXTENDED RELEASE TRANSDERMAL ONCE
Status: COMPLETED | OUTPATIENT
Start: 2022-03-22 | End: 2022-03-23

## 2022-03-22 RX ORDER — SODIUM CHLORIDE, SODIUM LACTATE, POTASSIUM CHLORIDE, CALCIUM CHLORIDE 600; 310; 30; 20 MG/100ML; MG/100ML; MG/100ML; MG/100ML
INJECTION, SOLUTION INTRAVENOUS CONTINUOUS PRN
Status: DISCONTINUED | OUTPATIENT
Start: 2022-03-22 | End: 2022-03-22

## 2022-03-22 RX ORDER — OXYCODONE HYDROCHLORIDE 5 MG/1
5 TABLET ORAL EVERY 4 HOURS PRN
Status: DISCONTINUED | OUTPATIENT
Start: 2022-03-22 | End: 2022-03-22 | Stop reason: HOSPADM

## 2022-03-22 RX ORDER — ENALAPRILAT 1.25 MG/ML
1.25 INJECTION INTRAVENOUS EVERY 6 HOURS PRN
Status: DISCONTINUED | OUTPATIENT
Start: 2022-03-22 | End: 2022-03-23 | Stop reason: HOSPADM

## 2022-03-22 RX ORDER — LIDOCAINE HYDROCHLORIDE 20 MG/ML
INJECTION, SOLUTION INFILTRATION; PERINEURAL PRN
Status: DISCONTINUED | OUTPATIENT
Start: 2022-03-22 | End: 2022-03-22

## 2022-03-22 RX ORDER — FENTANYL CITRATE 50 UG/ML
25 INJECTION, SOLUTION INTRAMUSCULAR; INTRAVENOUS EVERY 5 MIN PRN
Status: DISCONTINUED | OUTPATIENT
Start: 2022-03-22 | End: 2022-03-22 | Stop reason: HOSPADM

## 2022-03-22 RX ORDER — PROPOFOL 10 MG/ML
INJECTION, EMULSION INTRAVENOUS CONTINUOUS PRN
Status: DISCONTINUED | OUTPATIENT
Start: 2022-03-22 | End: 2022-03-22

## 2022-03-22 RX ORDER — ONDANSETRON 4 MG/1
4 TABLET, ORALLY DISINTEGRATING ORAL EVERY 30 MIN PRN
Status: DISCONTINUED | OUTPATIENT
Start: 2022-03-22 | End: 2022-03-22 | Stop reason: HOSPADM

## 2022-03-22 RX ORDER — DEXAMETHASONE SODIUM PHOSPHATE 4 MG/ML
INJECTION, SOLUTION INTRA-ARTICULAR; INTRALESIONAL; INTRAMUSCULAR; INTRAVENOUS; SOFT TISSUE PRN
Status: DISCONTINUED | OUTPATIENT
Start: 2022-03-22 | End: 2022-03-22

## 2022-03-22 RX ORDER — NALOXONE HYDROCHLORIDE 0.4 MG/ML
0.4 INJECTION, SOLUTION INTRAMUSCULAR; INTRAVENOUS; SUBCUTANEOUS
Status: DISCONTINUED | OUTPATIENT
Start: 2022-03-22 | End: 2022-03-23 | Stop reason: HOSPADM

## 2022-03-22 RX ORDER — ONDANSETRON 2 MG/ML
4 INJECTION INTRAMUSCULAR; INTRAVENOUS EVERY 6 HOURS PRN
Status: DISCONTINUED | OUTPATIENT
Start: 2022-03-22 | End: 2022-03-23 | Stop reason: HOSPADM

## 2022-03-22 RX ORDER — FENTANYL CITRATE 50 UG/ML
25 INJECTION, SOLUTION INTRAMUSCULAR; INTRAVENOUS
Status: DISCONTINUED | OUTPATIENT
Start: 2022-03-22 | End: 2022-03-22 | Stop reason: HOSPADM

## 2022-03-22 RX ORDER — NICOTINE POLACRILEX 4 MG
15-30 LOZENGE BUCCAL
Status: DISCONTINUED | OUTPATIENT
Start: 2022-03-22 | End: 2022-03-23 | Stop reason: HOSPADM

## 2022-03-22 RX ORDER — DIPHENHYDRAMINE HCL 25 MG
25 CAPSULE ORAL EVERY 6 HOURS PRN
Status: DISCONTINUED | OUTPATIENT
Start: 2022-03-22 | End: 2022-03-23 | Stop reason: HOSPADM

## 2022-03-22 RX ORDER — SODIUM CHLORIDE, SODIUM LACTATE, POTASSIUM CHLORIDE, CALCIUM CHLORIDE 600; 310; 30; 20 MG/100ML; MG/100ML; MG/100ML; MG/100ML
INJECTION, SOLUTION INTRAVENOUS CONTINUOUS
Status: DISCONTINUED | OUTPATIENT
Start: 2022-03-22 | End: 2022-03-22 | Stop reason: HOSPADM

## 2022-03-22 RX ORDER — LIDOCAINE 40 MG/G
CREAM TOPICAL
Status: DISCONTINUED | OUTPATIENT
Start: 2022-03-22 | End: 2022-03-23 | Stop reason: HOSPADM

## 2022-03-22 RX ORDER — PROPOFOL 10 MG/ML
INJECTION, EMULSION INTRAVENOUS PRN
Status: DISCONTINUED | OUTPATIENT
Start: 2022-03-22 | End: 2022-03-22

## 2022-03-22 RX ORDER — NALOXONE HYDROCHLORIDE 0.4 MG/ML
0.2 INJECTION, SOLUTION INTRAMUSCULAR; INTRAVENOUS; SUBCUTANEOUS
Status: DISCONTINUED | OUTPATIENT
Start: 2022-03-22 | End: 2022-03-23 | Stop reason: HOSPADM

## 2022-03-22 RX ORDER — OXYCODONE HYDROCHLORIDE 5 MG/1
5 TABLET ORAL
Status: DISCONTINUED | OUTPATIENT
Start: 2022-03-22 | End: 2022-03-23 | Stop reason: HOSPADM

## 2022-03-22 RX ORDER — ONDANSETRON 2 MG/ML
4 INJECTION INTRAMUSCULAR; INTRAVENOUS EVERY 30 MIN PRN
Status: DISCONTINUED | OUTPATIENT
Start: 2022-03-22 | End: 2022-03-22 | Stop reason: HOSPADM

## 2022-03-22 RX ORDER — ONDANSETRON 2 MG/ML
4 INJECTION INTRAMUSCULAR; INTRAVENOUS
Status: DISCONTINUED | OUTPATIENT
Start: 2022-03-22 | End: 2022-03-22 | Stop reason: HOSPADM

## 2022-03-22 RX ORDER — AMOXICILLIN 250 MG
2 CAPSULE ORAL 2 TIMES DAILY
Status: DISCONTINUED | OUTPATIENT
Start: 2022-03-22 | End: 2022-03-23 | Stop reason: HOSPADM

## 2022-03-22 RX ORDER — FENTANYL CITRATE 50 UG/ML
INJECTION, SOLUTION INTRAMUSCULAR; INTRAVENOUS PRN
Status: DISCONTINUED | OUTPATIENT
Start: 2022-03-22 | End: 2022-03-22

## 2022-03-22 RX ORDER — HYDROMORPHONE HYDROCHLORIDE 1 MG/ML
0.2 INJECTION, SOLUTION INTRAMUSCULAR; INTRAVENOUS; SUBCUTANEOUS EVERY 5 MIN PRN
Status: DISCONTINUED | OUTPATIENT
Start: 2022-03-22 | End: 2022-03-22 | Stop reason: HOSPADM

## 2022-03-22 RX ORDER — CEFAZOLIN SODIUM IN 0.9 % NACL 3 G/100 ML
3 INTRAVENOUS SOLUTION, PIGGYBACK (ML) INTRAVENOUS SEE ADMIN INSTRUCTIONS
Status: DISCONTINUED | OUTPATIENT
Start: 2022-03-22 | End: 2022-03-22 | Stop reason: HOSPADM

## 2022-03-22 RX ORDER — SERTRALINE HYDROCHLORIDE 25 MG/1
25 TABLET, FILM COATED ORAL DAILY
Status: DISCONTINUED | OUTPATIENT
Start: 2022-03-23 | End: 2022-03-23 | Stop reason: HOSPADM

## 2022-03-22 RX ORDER — ACETAMINOPHEN 325 MG/1
650 TABLET ORAL EVERY 4 HOURS PRN
Status: DISCONTINUED | OUTPATIENT
Start: 2022-03-22 | End: 2022-03-23 | Stop reason: HOSPADM

## 2022-03-22 RX ORDER — KETOROLAC TROMETHAMINE 30 MG/ML
30 INJECTION, SOLUTION INTRAMUSCULAR; INTRAVENOUS EVERY 6 HOURS
Status: COMPLETED | OUTPATIENT
Start: 2022-03-22 | End: 2022-03-23

## 2022-03-22 RX ORDER — DEXTROSE MONOHYDRATE 25 G/50ML
25-50 INJECTION, SOLUTION INTRAVENOUS
Status: DISCONTINUED | OUTPATIENT
Start: 2022-03-22 | End: 2022-03-23 | Stop reason: HOSPADM

## 2022-03-22 RX ORDER — CEFAZOLIN SODIUM IN 0.9 % NACL 3 G/100 ML
3 INTRAVENOUS SOLUTION, PIGGYBACK (ML) INTRAVENOUS
Status: COMPLETED | OUTPATIENT
Start: 2022-03-22 | End: 2022-03-22

## 2022-03-22 RX ORDER — ACETAMINOPHEN 325 MG/1
975 TABLET ORAL ONCE
Status: COMPLETED | OUTPATIENT
Start: 2022-03-22 | End: 2022-03-22

## 2022-03-22 RX ORDER — ONDANSETRON 2 MG/ML
INJECTION INTRAMUSCULAR; INTRAVENOUS PRN
Status: DISCONTINUED | OUTPATIENT
Start: 2022-03-22 | End: 2022-03-22

## 2022-03-22 RX ADMIN — HYDROMORPHONE HYDROCHLORIDE 0.2 MG: 1 INJECTION, SOLUTION INTRAMUSCULAR; INTRAVENOUS; SUBCUTANEOUS at 12:29

## 2022-03-22 RX ADMIN — FENTANYL CITRATE 25 MCG: 50 INJECTION INTRAMUSCULAR; INTRAVENOUS at 11:16

## 2022-03-22 RX ADMIN — KETOROLAC TROMETHAMINE 30 MG: 30 INJECTION, SOLUTION INTRAMUSCULAR at 17:54

## 2022-03-22 RX ADMIN — SODIUM CHLORIDE, POTASSIUM CHLORIDE, SODIUM LACTATE AND CALCIUM CHLORIDE: 600; 310; 30; 20 INJECTION, SOLUTION INTRAVENOUS at 11:32

## 2022-03-22 RX ADMIN — ACETAMINOPHEN 975 MG: 325 TABLET ORAL at 08:50

## 2022-03-22 RX ADMIN — SENNOSIDES AND DOCUSATE SODIUM 2 TABLET: 50; 8.6 TABLET ORAL at 21:02

## 2022-03-22 RX ADMIN — FENTANYL CITRATE 100 MCG: 50 INJECTION, SOLUTION INTRAMUSCULAR; INTRAVENOUS at 10:10

## 2022-03-22 RX ADMIN — FENTANYL CITRATE 50 MCG: 50 INJECTION, SOLUTION INTRAMUSCULAR; INTRAVENOUS at 10:00

## 2022-03-22 RX ADMIN — HYOSCYAMINE SULFATE 125 MCG: 0.12 TABLET ORAL at 16:24

## 2022-03-22 RX ADMIN — LIDOCAINE HYDROCHLORIDE 100 MG: 20 INJECTION, SOLUTION INFILTRATION; PERINEURAL at 09:40

## 2022-03-22 RX ADMIN — HYOSCYAMINE SULFATE 125 MCG: 0.12 TABLET ORAL at 21:27

## 2022-03-22 RX ADMIN — FAMOTIDINE 20 MG: 10 INJECTION, SOLUTION INTRAVENOUS at 09:50

## 2022-03-22 RX ADMIN — LIDOCAINE HYDROCHLORIDE 100 MG: 20 INJECTION, SOLUTION INFILTRATION; PERINEURAL at 09:45

## 2022-03-22 RX ADMIN — SCOPALAMINE 1 PATCH: 1 PATCH, EXTENDED RELEASE TRANSDERMAL at 08:49

## 2022-03-22 RX ADMIN — PROPOFOL 100 MCG/KG/MIN: 10 INJECTION, EMULSION INTRAVENOUS at 09:42

## 2022-03-22 RX ADMIN — FENTANYL CITRATE 50 MCG: 50 INJECTION, SOLUTION INTRAMUSCULAR; INTRAVENOUS at 09:53

## 2022-03-22 RX ADMIN — DEXAMETHASONE SODIUM PHOSPHATE 8 MG: 4 INJECTION, SOLUTION INTRA-ARTICULAR; INTRALESIONAL; INTRAMUSCULAR; INTRAVENOUS; SOFT TISSUE at 09:46

## 2022-03-22 RX ADMIN — OXYCODONE HYDROCHLORIDE 5 MG: 5 TABLET ORAL at 11:09

## 2022-03-22 RX ADMIN — SUGAMMADEX 200 MG: 100 INJECTION, SOLUTION INTRAVENOUS at 10:25

## 2022-03-22 RX ADMIN — ENOXAPARIN SODIUM 40 MG: 40 INJECTION SUBCUTANEOUS at 08:50

## 2022-03-22 RX ADMIN — ONDANSETRON 4 MG: 2 INJECTION INTRAMUSCULAR; INTRAVENOUS at 16:29

## 2022-03-22 RX ADMIN — MAGNESIUM SULFATE HEPTAHYDRATE 2 G: 40 INJECTION, SOLUTION INTRAVENOUS at 09:46

## 2022-03-22 RX ADMIN — OXYCODONE HYDROCHLORIDE 10 MG: 10 TABLET ORAL at 21:27

## 2022-03-22 RX ADMIN — OXYCODONE HYDROCHLORIDE 10 MG: 10 TABLET ORAL at 15:56

## 2022-03-22 RX ADMIN — KETOROLAC TROMETHAMINE 30 MG: 30 INJECTION, SOLUTION INTRAMUSCULAR at 11:10

## 2022-03-22 RX ADMIN — PROPOFOL 250 MG: 10 INJECTION, EMULSION INTRAVENOUS at 09:42

## 2022-03-22 RX ADMIN — Medication 3 G: at 09:41

## 2022-03-22 RX ADMIN — KETOROLAC TROMETHAMINE 30 MG: 30 INJECTION, SOLUTION INTRAMUSCULAR at 23:52

## 2022-03-22 RX ADMIN — ROCURONIUM BROMIDE 50 MG: 50 INJECTION, SOLUTION INTRAVENOUS at 09:42

## 2022-03-22 RX ADMIN — SODIUM CHLORIDE, POTASSIUM CHLORIDE, SODIUM LACTATE AND CALCIUM CHLORIDE: 600; 310; 30; 20 INJECTION, SOLUTION INTRAVENOUS at 09:30

## 2022-03-22 RX ADMIN — ONDANSETRON 4 MG: 2 INJECTION INTRAMUSCULAR; INTRAVENOUS at 10:20

## 2022-03-22 RX ADMIN — MIDAZOLAM 2 MG: 1 INJECTION INTRAMUSCULAR; INTRAVENOUS at 09:40

## 2022-03-22 RX ADMIN — PROPOFOL 50 MG: 10 INJECTION, EMULSION INTRAVENOUS at 09:44

## 2022-03-22 RX ADMIN — HYDROMORPHONE HYDROCHLORIDE 0.2 MG: 1 INJECTION, SOLUTION INTRAMUSCULAR; INTRAVENOUS; SUBCUTANEOUS at 12:22

## 2022-03-22 RX ADMIN — FENTANYL CITRATE 25 MCG: 50 INJECTION INTRAMUSCULAR; INTRAVENOUS at 11:42

## 2022-03-22 ASSESSMENT — ACTIVITIES OF DAILY LIVING (ADL)
ADLS_ACUITY_SCORE: 3
ADLS_ACUITY_SCORE: 1
ADLS_ACUITY_SCORE: 3

## 2022-03-22 NOTE — CONSULTS
Brief Diabetes Consult Note:    Babita is a 36 year old female with PMH significant for depression, DM type 2, morbid obesity, and PTSD who is post op day #0 from gastric sleeve surgery.  Endocrine consulted given patient's DM history.  PTA she is taking Victoza 1.8mg daily and Metformin.  She received 8 mg dexamethasone in the OR this AM.  BG 94.       Plan:  -Novolog moderate intensity sliding scale >140 AC and >200 HS ordered.   -do not anticipate insulin or resumption of her Metformin or Victoza will be required upon discharge. She should continue to monitor BG 3-4 times daily and follow up with her diabetes provider.      Our service will complete consult request and make final/discharge recommendations in the morning.     TAMANNA Holman, CNP  Inpatient Diabetes Management Service  Pager [phone]

## 2022-03-22 NOTE — OP NOTE
[facility]    Operative Note    Pre-operative diagnosis: Morbid obesity (H) [E66.01]   Post-operative diagnosis * No post-op diagnosis entered *   Procedure: Procedure(s):  GASTRECTOMY, SLEEVE, LAPAROSCOPIC   Surgeon: Surgeon(s) and Role:     * Ky Fitzpatrick MD - Primary   Assistant Surgeon      Anesthesia: none      General    Estimated blood loss: Less than 10 ml   Drains: None   Specimens: ID Type Source Tests Collected by Time Destination   1 : partial gastrectomy  Tissue Stomach SURGICAL PATHOLOGY EXAM Ky Fitzpatrick MD 3/22/2022 10:13 AM       Findings: None.   Complications: None.   Implants: None.         BOUGIE SIZE: 40 FR  DISTANCE FROM PYLORUS: 10 CM  STAPLE LINE REINFORCEMENT: NO  STAPLE LINE OVERSEW: NO  COMORBIDITIES:   Past Medical History:   Diagnosis Date     Depression      Diabetes mellitus, type 2 (H)      Exercise-induced asthma      Morbid obesity (H)      PTSD (post-traumatic stress disorder)        INDICATIONS FOR PROCEDURE  Babita Rivas is a 36 year old female who is morbidly obese.  Numerous weight loss attempts without surgery have been without success.     After understanding the risks and benefits of proceeding with a laparoscopic vertical sleeve gastrectomy, she agreed to an operation as outlined by me.    I reviewed the risks of surgery with Babita Rivas.    These include, but are not limited to, death, myocardial infarction, pneumonia, urinary tract infection, deep venous thrombosis with or without pulmonary embolus, abdominal infection from bowel injury or abscess, bowel obstruction, wound infection, and bleeding.    More specific risks related to vertical sleeve gastrectomy were detailed at the bariatric informational seminar and include the followin.) leak at the vertical sleeve staple line, 2.) stricture in the sleeve, 3.) nausea, vomiting, and dehydration for several months, 4.) adhesions causing  "bowel obstruction, 5.) rapid weight loss causing a higher rate of gallstone formation during the first 6 months after surgery, 6.) decreased absorption of vitamins because of the reduced stomach size, 7.) weight regain if inappropriate food intake occurs.    The BMI that we are treating this patient for was measured at the initial consultation visit in our bariatric program and it was: 49.8 kg/m2 (as calculated just below).      The initial consult height, weight, and BMI are as follows:    Height: 5' 2\"   BARIATRIC WEIGHT TRACKING 10/5/2021   Initial BMI 49.78       Our weight loss surgery program requires weight loss prior to bariatric surgery and currently the height, weight, and BMI are as follows:    Height: 157.5 cm (5' 2\"), Weight: 127.7 kg (281 lb 8.4 oz), and currently the Body mass index is 51.49 kg/m .    Due to the patient's comorbidity conditions of T2DM, and asthma, in association with elevated body mass index, bariatric surgery has been recommended and is being performed today.    Moreover, as the surgeon performing this procedure, I certify that the following are true in regards to this patient at or prior to the day of surgery:    1. The patient's body mass index (BMI) is or has been greater than or equal to 35 kg/m2.  2. The patient has at least one co-morbidity related to obesity (as outlined above).  3. The patient has been previously unsuccessful with medical treatment for obesity.  Please note that some of this information has been documented as part of a comprehensive pre-bariatric surgery process in the outpatient clinic and is NOT immediately available in the inpatient encounter for this bariatric operation.      OPERATIVE PROCEDURE:     Babita Rivas was brought to the operating room and prepared in routine fashion. Under the benefits of general anesthesia, a left upper quadrant Veress needle was inserted and pneumoperitoneum was established using carbon dioxide gas to a maximum " pressure of 15 mmHg. A total of five ports were placed into the abdomen.     A liver retractor was placed through the rightmost port and this provided a view of the upper stomach. The operation was started by dividing the short gastric vessels off the greater curvature of the stomach. This dissection was carried up to the angle of His, and ligasure dissector was used for hemostasis.     A bougie (size noted above) was passed into the stomach and I used 5  blue loads of the [employer] [place] flex linear cutter stapler device to create a vertical sleeve gastrectomy with the bougie as a template. The bougie was removed.    A transabdominal properitoneal anesthetic block was performed using 30 ml 0.5% bupivicaine diluted with 90 ml saline (120 mL total); this was injected using 22gauge spinal needle into the properitoneal planes around the ports and laterally along the anterior axillary line under direct optical guidance. Some of the mixture was used to inject local anesthetic at the skin of each incision as well.    The sleeve gastrectomy specimen (partial gastrectomy) was now removed from the abdomen through the 15 mm port.    Hemostasis was secured, and the liver retractor and all ports were removed from the abdomen under direct visualization.     All needle and sponge counts were correct x2 at the end of the operation, and I was present for all critical components of the procedure.     Skin incisions were closed using skin staples, and sterile dressings were placed.     Ky Fitzpatrick MD  Surgery  [phone] (hospital )  [phone] (clinic nurses)

## 2022-03-22 NOTE — ANESTHESIA POSTPROCEDURE EVALUATION
Patient: Babita Rivas    Procedure: Procedure(s):  GASTRECTOMY, SLEEVE, LAPAROSCOPIC       Anesthesia Type:  General    Note:  Disposition: Admission   Postop Pain Control: Uneventful            Sign Out: Well controlled pain   PONV: No   Neuro/Psych: Uneventful            Sign Out: Acceptable/Baseline neuro status   Airway/Respiratory: Uneventful            Sign Out: Acceptable/Baseline resp. status   CV/Hemodynamics: Uneventful            Sign Out: Acceptable CV status; No obvious hypovolemia; No obvious fluid overload   Other NRE: NONE   DID A NON-ROUTINE EVENT OCCUR? No           Last vitals:  Vitals Value Taken Time   /91 03/22/22 1100   Temp 36.9  C (98.5  F) 03/22/22 1039   Pulse 102 03/22/22 1108   Resp 0 03/22/22 1103   SpO2 98 % 03/22/22 1108   Vitals shown include unvalidated device data.    Electronically Signed By: Eddie Morley MD  March 22, 2022  11:09 AM

## 2022-03-22 NOTE — ANESTHESIA CARE TRANSFER NOTE
Patient: Babita Rivas    Procedure: Procedure(s):  GASTRECTOMY, SLEEVE, LAPAROSCOPIC       Diagnosis: Morbid obesity (H) [E66.01]  Diagnosis Additional Information: No value filed.    Anesthesia Type:   General     Note:    Oropharynx: oral airway in place  Level of Consciousness: drowsy  Oxygen Supplementation: face mask    Independent Airway: airway patency satisfactory and stable        Patient transferred to: PACU    Handoff Report: Identifed the Patient, Identified the Reponsible Provider, Reviewed the pertinent medical history, Discussed the surgical course, Reviewed Intra-OP anesthesia mangement and issues during anesthesia, Set expectations for post-procedure period and Allowed opportunity for questions and acknowledgement of understanding      Vitals:  Vitals Value Taken Time   /91 03/22/22 1038   Temp     Pulse 99 03/22/22 1038   Resp 16    SpO2 96 % 03/22/22 1039   Vitals shown include unvalidated device data.    Electronically Signed By: TAMANNA Milian CRNA  March 22, 2022  10:40 AM

## 2022-03-22 NOTE — PROGRESS NOTES
Pt hypertensive when arrived on unit and tachy otherwise all other VSS. Pt stable and sleeping. C/o chest pain team aware of this.

## 2022-03-22 NOTE — PROVIDER NOTIFICATION
"Provider notified \" pt hypertensive 140s/90s. Could you please put in a vital parameters? Thank you\"  "

## 2022-03-22 NOTE — PROGRESS NOTES
Admitted/transferred from:   2 RN full   skin assessment completed by Mitali Ochoa, ROYAL and Alicia CASTILLO RN.  Skin assessment finding: other 5 incisional marks from s/p   Interventions/actions: skin interventions Continue to monitor.      Will continue to monitor.

## 2022-03-23 VITALS
DIASTOLIC BLOOD PRESSURE: 79 MMHG | WEIGHT: 281.53 LBS | SYSTOLIC BLOOD PRESSURE: 137 MMHG | HEART RATE: 63 BPM | HEIGHT: 62 IN | OXYGEN SATURATION: 98 % | RESPIRATION RATE: 18 BRPM | BODY MASS INDEX: 51.81 KG/M2 | TEMPERATURE: 98 F

## 2022-03-23 LAB — GLUCOSE BLDC GLUCOMTR-MCNC: 97 MG/DL (ref 70–99)

## 2022-03-23 PROCEDURE — 258N000003 HC RX IP 258 OP 636: Performed by: STUDENT IN AN ORGANIZED HEALTH CARE EDUCATION/TRAINING PROGRAM

## 2022-03-23 PROCEDURE — 250N000011 HC RX IP 250 OP 636: Performed by: STUDENT IN AN ORGANIZED HEALTH CARE EDUCATION/TRAINING PROGRAM

## 2022-03-23 PROCEDURE — 99207 PR NO BILLABLE SERVICE THIS VISIT: CPT | Performed by: NURSE PRACTITIONER

## 2022-03-23 PROCEDURE — 250N000013 HC RX MED GY IP 250 OP 250 PS 637: Performed by: STUDENT IN AN ORGANIZED HEALTH CARE EDUCATION/TRAINING PROGRAM

## 2022-03-23 RX ORDER — ACETAMINOPHEN 325 MG/1
650 TABLET ORAL EVERY 4 HOURS PRN
Qty: 30 TABLET | Refills: 0 | Status: SHIPPED | OUTPATIENT
Start: 2022-03-23

## 2022-03-23 RX ORDER — OXYCODONE HYDROCHLORIDE 5 MG/1
5 TABLET ORAL
Qty: 12 TABLET | Refills: 0 | Status: SHIPPED | OUTPATIENT
Start: 2022-03-23 | End: 2022-04-26

## 2022-03-23 RX ADMIN — HYOSCYAMINE SULFATE 125 MCG: 0.12 TABLET ORAL at 07:02

## 2022-03-23 RX ADMIN — OXYCODONE HYDROCHLORIDE 10 MG: 10 TABLET ORAL at 08:10

## 2022-03-23 RX ADMIN — ENOXAPARIN SODIUM 40 MG: 40 INJECTION SUBCUTANEOUS at 08:04

## 2022-03-23 RX ADMIN — SERTRALINE HYDROCHLORIDE 25 MG: 25 TABLET ORAL at 08:03

## 2022-03-23 RX ADMIN — KETOROLAC TROMETHAMINE 30 MG: 30 INJECTION, SOLUTION INTRAMUSCULAR at 04:56

## 2022-03-23 RX ADMIN — SODIUM CHLORIDE, POTASSIUM CHLORIDE, SODIUM LACTATE AND CALCIUM CHLORIDE: 600; 310; 30; 20 INJECTION, SOLUTION INTRAVENOUS at 03:29

## 2022-03-23 RX ADMIN — OMEPRAZOLE 20 MG: 20 CAPSULE, DELAYED RELEASE ORAL at 08:03

## 2022-03-23 RX ADMIN — SENNOSIDES AND DOCUSATE SODIUM 2 TABLET: 50; 8.6 TABLET ORAL at 08:13

## 2022-03-23 RX ADMIN — OXYCODONE HYDROCHLORIDE 5 MG: 5 TABLET ORAL at 11:07

## 2022-03-23 ASSESSMENT — ACTIVITIES OF DAILY LIVING (ADL)
ADLS_ACUITY_SCORE: 3

## 2022-03-23 NOTE — PLAN OF CARE
"BP (!) 144/81   Pulse 95   Temp 98.2  F (36.8  C)   Resp 16   Ht 1.575 m (5' 2\")   Wt 127.7 kg (281 lb 8.4 oz)   LMP 02/28/2022   SpO2 96%   BMI 51.49 kg/m       Neuro: A &Ox4. No deficits noted.   Respiratory: WDL on RA. Pt educated on use of incentive spirometer.   Cardiac: Hypertensive and tachycardic occasionally  GI/: Pt voiding spontaneously. Bowel sounds normoactive.   Diet: Poor appetite. Pt had an episode of nausea. IV zofran given with relief.   Pain: Pt reported severe pain at the start of the shift. 10mg oxycodone, toradol, and levsin given. Pain reported to be at a 2 at recheck.   Incisions/Drains: 5 lap sites covered with primapore. No drainage noted.   IV Access: L PIV infusing LR 75mL/hr   Labs:   Activity: Up to the bathroom with ax1.        New changes this shift: No acute changes.   Plan:  Continue pain management and POC.  "

## 2022-03-23 NOTE — DISCHARGE SUMMARY
"[facility]   MIS Discharge Summary    Date of Admission: 3/22/2022  Date of Discharge: 3/23/2022    Admission Diagnosis:  1. Morbid Obesity    Discharge Diagnosis:  1. Same as above  2. S/p sleeve gastrectomy    Consultations:  Pharmacy   Endocrine     Procedures:  1. Vertical Sleeve Gastrectomy by Dr Nanette Morales HPI:  Adult onset obesity associated with pregnancies. Multiple weight loss attempts over the years without durable weight loss. Wanting to pursue bariatric surgery. comorbidities include DMII. She has met all per surgery requirements and has chosen to undergo sleeve gastrectomy.     Hospital Course:  The patient was admitted and underwent the above procedure. The patient tolerated the procedure well. There were no complications. The patient's diet was slowly advanced as bowel function returned. Pain was controlled with oral pain medication and the patient was able to ambulate and void without difficulty. The patient received appropriate education post operatively. On POD #1 the patient was discharged to home.    Discharge Physical Exam:  /79 (Patient Position: Semi-Carson's, Cuff Size: Adult Regular)   Pulse 63   Temp 98  F (36.7  C) (Oral)   Resp 18   Ht 1.575 m (5' 2\")   Wt 127.7 kg (281 lb 8.4 oz)   LMP 02/28/2022   SpO2 98%   BMI 51.49 kg/m      Gen: NAD  Lungs: non-labored breathing  CV: regular rhythm, normal rate   Abd: obese, soft, nondistended, appropriately tender, incisions are c/d/i  Ext: no peripheral edema  Neuro: AOx3    Meds:       Review of your medicines      START taking      Dose / Directions   acetaminophen 325 MG tablet  Commonly known as: TYLENOL  Used for: Morbid obesity (H), S/P laparoscopic sleeve gastrectomy      Dose: 650 mg  Take 2 tablets (650 mg) by mouth every 4 hours as needed for other (For optimal non-opioid multimodal pain management to improve pain control and physical function.)  Quantity: 30 tablet  Refills: 0   "   oxyCODONE 5 MG tablet  Commonly known as: ROXICODONE  Used for: Morbid obesity (H), S/P laparoscopic sleeve gastrectomy      Dose: 5 mg  Take 1 tablet (5 mg) by mouth every 3 hours as needed for severe pain  Quantity: 12 tablet  Refills: 0        CONTINUE these medicines which have NOT CHANGED      Dose / Directions   hydrOXYzine 10 MG tablet  Commonly known as: ATARAX      Dose: 10 mg  Take 10 mg by mouth  Refills: 0     hyoscyamine 0.125 MG tablet  Commonly known as: LEVSIN  Used for: Morbid obesity (H)      Dose: 125 mcg  Take 1 tablet (125 mcg) by mouth every 4 hours as needed for cramping  Quantity: 30 tablet  Refills: 1     insulin pen needle 32G X 6 MM miscellaneous  Commonly known as: 32G X 6 MM  Used for: Type 2 diabetes mellitus without complication, without long-term current use of insulin (H)      Use 1 pen needles daily or as directed with Victoza.  Quantity: 100 each  Refills: 1     omeprazole 20 MG DR capsule  Commonly known as: priLOSEC  Used for: Morbid obesity (H)      Dose: 20 mg  Take 1 capsule (20 mg) by mouth daily  Quantity: 30 capsule  Refills: 3     ondansetron 4 MG ODT tab  Commonly known as: ZOFRAN-ODT  Used for: Morbid obesity (H)      Dose: 4 mg  Take 1 tablet (4 mg) by mouth every 8 hours as needed for nausea  Quantity: 15 tablet  Refills: 0     senna-docusate 8.6-50 MG tablet  Commonly known as: SENOKOT-S/PERICOLACE  Used for: Morbid obesity (H)      Dose: 2 tablet  Take 2 tablets by mouth daily as needed for constipation (While taking narcotic pain medications.  Stop taking if having loose stools.)  Quantity: 30 tablet  Refills: 1     sertraline 25 MG tablet  Commonly known as: ZOLOFT      Dose: 25 mg  Take 25 mg by mouth daily  Refills: 0        STOP taking    metFORMIN 500 MG 24 hr tablet  Commonly known as: GLUCOPHAGE-XR        VICTOZA PEN 18 MG/3ML solution  Generic drug: liraglutide        vitamin D3 50 mcg (2000 units) tablet  Commonly known as: CHOLECALCIFEROL               Where to get your medicines      These medications were sent to [facility] - [place], [place] - [address]  [address], [facility] [address]    Phone: [phone]     acetaminophen 325 MG tablet    oxyCODONE 5 MG tablet         Additional instructions:  After Care     Future Labs/Procedures    Activity     Comments:    Your activity upon discharge: activity as tolerated and no lifting of more than 20lb for 4 weeks    Diet     Comments:    Follow this diet upon discharge: Bariatric full liquid              Follow Up:  -Follow up with Arlette Corbett CNP 3/29/2022       BARIATRIC PATIENTS:  Call [phone] to schedule or to reach your care team    GENERAL SURGERY PATIENTS: Call [phone] for scheduling needs or to reach your care team

## 2022-03-23 NOTE — DISCHARGE INSTRUCTIONS
After Bariatric Surgery Discharge Instructions  [facility] Comprehensive Weight Management     Note: Ask your nurse to order your medications from the pharmacy. Be sure you have your medications with you when you leave.  Diet on discharge bariatric full liquids.      How much fluid should I drink?    Strive for 48-64 ounces daily.    Carry a water bottle with you without a straw or sports top. Drink from it often.    Keep track of how much fluid you drink in a day.    Remember:  -Do not use straws, chew gum or suck on hard candies. They may cause painful gas.    -Sip, don't slurp when you drink.    -Practice small sips using a medicine cup for the first week postop.   -No ice or cold drinks. This could cause gas or spasms.   -No coffee, soda pop or drinks with caffeine. These may cause stomach pain.   -No alcohol. It is bad for your liver and will cause stomach pain.    How often should I do my deep breathing and coughing?    Use your incentive spirometer (small plastic breathing device) every hour while awake after you get home. Using the incentive spirometer helps you deep breath. Continuing to cough and deep breath will help prevent fevers and pneumonia.     If you do not have a fever after one week, you can stop using the incentive spirometer and discard it.       You can continue to take deep breaths without the incentive spirometer every one to two hours while awake for the month after surgery.    What kinds of activity can I do?    Get plenty of rest the first few days after surgery and try to balance rest and activity. You will need some time to recover - you may be more tired than you realize at first. You'll feel better and heal faster if you take good care of yourself.  For 4 weeks after surgery (Please review restrictions at your one week visit, they could change based on how well you are doing):  -Don't lift more than 20 pounds.   -Take 4-5 short walks every day.  -Don't jog, run, or do belly  exercises.    Don't swim, bathe or use a hot tub until your cuts are healed (scabs are gone).  You may shower    Don't plan to fly or take a road trip within the first 1 to 3 months after surgery.  You could get a blood clot in your legs. If you must travel, get up and move around every hour for at least 5 minutes before continuing on your journey.    Your care team can help you decide when it's safe for you to travel.     What can I do for pain control?  You had major belly surgery that involves all layers of your belly muscles. Pain is expected, even for some as far out as 6-8 weeks after surgery. Moving, sneezing, coughing, and breathing will cause discomfort because these activities use your belly muscles.     Please see your after visit summary medication review for what pain medication will be continued, discontinued and newly started for you.      You can take opioid pain medicine if prescribed and if needed. Try to wean off from it as soon as you feel comfortable.     Do not drive while you are taking opioid pain medicine. This is dangerous.    You can take acetaminophen (Tylenol) between your prescribed pain medicine on a scheduled basis OR take it scheduled every 6 hours (check with your care team for specifics).  -Acetaminophen formulation options:    -Liquid   -Caplet (Cut caplet in half before taking)   -Do not take more than 3000 mg Tylenol in a 24 hour period.  -You may also take Tylenol for pain in place of the opioid pain medicine (check with your care team for specifics).     You can apply ice or heat to the affected area(s). Just remember to wrap the ice in something and limit icing sessions to 20 minutes. Excessive icing can irritate the skin or cause skin damage.    You can apply heat with a hot, wet towel or heating pad. Just like cold therapy, limit heat application to 20 minutes. Never sleep with a heating pad on. It could cause severe burns to your skin.    Wear your binder to support your  belly muscles if you have one.  Take this off a little more each day and try to be off completely by 2 weeks after surgery. If you don't need your binder for comfort or support, you don't need to wear it.     You may not be able to sleep in a comfortable position for a few weeks after surgery. This is normal. You may be more comfortable sleeping in a recliner or propped up with 3 pillows for the first couple of weeks after surgery.    Do not take NSAID's (Non-Steroidal Anti-Inflammatory Drugs) (examples: ibuprofen, Motrin, Advil, Aleve, and Naproxen), aspirin, or use pain patches with NSAID's. They will increase your risk of bleeding or getting an ulcer.    Please call the clinic for any of the following pain concerns, we would like to talk to you:  -pain that does not improve with rest  -pain that gets worse and worse  -pain that is not controlled by your pain medicine  -a sudden severe increase in pain    What medications will I need to take after surgery?    You may be discharged with the following types of medications: omeprazole 20 mg once daily for heartburn symptom prevention, zofran as needed for nausea and a medication called hyoscyamine (levsin) as needed for cramping.Please see your after visit summary medication review for what will be continued, discontinued and newly started.    It is important to reduce the amount of acid in your new stomach for a couple of months after surgery while it is still healing. We will prescribe an acid reducer or antacid. Take it as directed. This will help prevent ulcers, heartburn and acid reflux.    If you took an acid reducer before you had surgery, your care team will let you know what acid reducer you will take after surgery.     It is okay to swallow any medications smaller than   inch in size.   -If it is larger than   inch, it may need to be cut, crushed, or in a liquid form. Check with your care team about which way is most appropriate for you and your  medications.    What should I know about my incisions (cuts)?  Your incisions are covered with white steri strips or butterfly tape and have band aids or gauze over the top. If you have gauze or band aids, they can come off in the hospital.    Leave your steri strips on until they fall off on their own. If the steri strips don't fall off after 1 to 2 weeks, you can take them off. If they fall off earlier, replace them with clean band aids trying to avoid touching the incision itself.     If you have gauze covered by a clear dressing, remove 2-3 days after surgery or as directed by your care team.    You may shower in the hospital after surgery and can get your incision coverings wet.    Do not submerge in water (e.g. No baths, swimming pools, hot tubs) until your care team tells you it is okay and your incisions are completely healed.    Call the clinic if you have any of these signs or symptoms of infection:  -Redness around the site.  -Drainage that smells bad.  -Drainage that is thick yellow or green.  -An increase in pain around the incision site  -An increase in swelling around the incision site  -Heat or warmth around incision site   -Fever of 101.5  F (38.3  C) or higher when taken under the tongue.    -Chills    Will my urine or bowel movements change?   Your first bowel movements (stools) will likely be liquid. You may also notice old blood or a darker color (black or maroon color) in your bowel movements.  This is not unusual and usually goes away after the first week, if not sooner. You may not have a bowel movement for a week.     If you have not had a bowel movement for at least three days after your surgery date and are passing gas, you can use over the counter stool softeners.  Please stop taking the stool softeners and laxatives if your stools are loose.    Increasing fluids and activity as well as getting off narcotics will help prevent constipation.    Call the clinic if:   -You have stomach pain.  "  -You continue to have constipation.  -You have excessive bloating after walking and passing gas.    How can I prevent dehydration if I feel nauseated (sick to my stomach) and vomit (throw up)?     Vomiting is not normal after surgery. If you continue to have nausea and vomiting, call the clinic.     Nausea can be a sign of dehydration. That is why it is very important to track your fluids.    Do not nap more than one hour during the day. Set a timer to wake yourself up, if needed. Too much sleep will keep you from drinking enough fluid during the day and lead to dehydration.    No outside activity in hot, humid weather until you can drink 48 to 64 ounces of fluid in 24 hours. If you sweat a lot, your body may lose too much water.    Try to take a 1 ounce sip of water (one medicine cup) every 15 minutes.  Set a timer to remind yourself.    Call the clinic if you have any of these signs or symptoms of dehydration:  -Dark colored urine  -Urinating (pass water) less than 2-3 times per day  -Lack of energy  -Nausea  -Dizziness  -Headache    Call the clinic ANY TIME at [phone] if:  -Your pain medicine is not working.  -You have a fever ? 101.5 F.  -You have belly or left shoulder pain that gets worse and worse.  -You have a swollen leg with redness, warmth, or pain behind the knee or calf.  -You have chest pain   -You feel very short of breath.  -You have a sudden severe increase in heart rate.  -You have vomiting that gets worse and worse.  -You have constant nausea (feeling sick to your stomach) that does not go away with medication.  -You have trouble swallowing.  -You have an increasing feeling that \"something is not right\".  -You have hiccups that do not stop.  -You have any questions or concerns.    AFTER HOURS QUESTIONS OR CONCERNS: Call [phone] and ask to speak with surgery resident if you are having troubles in the evenings, at night, or on weekends. Please call if you experience increasing abdominal " pain, nausea, vomiting, increasing drainage from your wounds, chills, or fever >101.5    If you have to go to the Emergency Room, we prefer you go to the hospital that did your surgery. Please let them know that you had bariatric surgery and to notify your surgeon.    When should I go back to the clinic?    Follow up with your care team in 1-2 weeks.     If this appointment was not already made, please call: [phone]    Appointments located at [facility] clinic:  [facility]    [address]  [place] [address]

## 2022-03-23 NOTE — CONSULTS
Brief Diabetes Consult Note:       Babita is a 36 year old female with PMH significant for depression, DM type 2, morbid obesity, and PTSD who is post op day #0 from gastric sleeve surgery.  Endocrine consulted given patient's DM history.  PTA she is taking Victoza 1.8mg daily and Metformin.  She received 8 mg dexamethasone in the OR yesterday.  BG stable with no insulin needs.  See trend:        Recommendations:  -ok to continue novolog moderate intensity sliding scale insulin AC, HS while in the hospital  -BG checks AC, HS  -hold outpatient Metformin and Victoza and do not resume on discharge  -patient to check BG 2-3 times daily and follow up with her diabetes provider in 2-3 weeks    Please page with questions.     TAMANNA Holman, CNP  Inpatient Diabetes Management Service  Pager [phone]

## 2022-03-23 NOTE — PLAN OF CARE
Pt discharged at 1230. Accompanied by pts mom, who will be providing transport to home. Discharge orders reviewed. Belongings gathered by pt. Lap site c/d/I/. Staples removed and steri-strips placed. Pt to  meds at discharge pharmacy prior to leaving. Follow up appointment scheduled.

## 2022-03-23 NOTE — PLAN OF CARE
Vitals: Temp: 98.8  F (37.1  C) Temp src: Oral BP: 114/47 Pulse: 68   Resp: 16 SpO2: 94 % O2 Device: None (Room air) Oxygen Delivery: 2 LPM      Time: 6425-4357  Reason for admission: POD#1 lap sleeve.   Activity:  up ad ryan.   Pain:  Controlled w/ scheduled Toradol. Levsin x1. Abdominal binder in place.   Neuro: A&O x4. CMS intact.   Cardiac: WDL.   Respiratory:  LS clear and equal. Sating mid-90s on RA. Denies cough or SOB.   GI/:  Voiding spontaneously. No BM. +flatus.   Diet: Bariatric clears.   Lines:  L PIV infusing LR @75 ml/hr.   Incisions/Drains/Skin:  Abdominal lap sites x5 - CDI.   Lab:  Reviewed.      New changes this shift:  No changes. Continue POC.

## 2022-03-23 NOTE — PROGRESS NOTES
Anesthesia Staff Post-Op Evaluation  Patient seen for routine follow-up s/p bariatric surgery.    The patient has been hemodynamically stable, afebrile, with adequate urine output since surgery.  Notable events post-operatively include relatively poor pain control overnight, although the patient reports that her pain control in the PACU was adequate.    Vascular access sites are without evidence of infection.  The patient denies dental / oral or ocular trauma.    The patient denies intra-operative recall, and overall reports being pleased with the anesthetic care.    No apparent anesthetic complications.  I anticipate that she will be adequately managed on oral analgesics today and will discharge from the hospital.    TRENTON Morley MD  Clinical   Anesthesia / Critical Care  *[phone]

## 2022-03-24 ENCOUNTER — TELEPHONE (OUTPATIENT)
Dept: ENDOCRINOLOGY | Facility: CLINIC | Age: 37
End: 2022-03-24

## 2022-03-24 ENCOUNTER — TELEPHONE (OUTPATIENT)
Dept: ENDOCRINOLOGY | Facility: CLINIC | Age: 37
End: 2022-03-24
Payer: COMMERCIAL

## 2022-03-24 NOTE — TELEPHONE ENCOUNTER
12:25 pm     Spoke with patient. Answered questions regarding liquid diet. Will send summary via [employer] message.    BUNNY Rivera, RD, LD  Clinic #: [phone]

## 2022-03-24 NOTE — TELEPHONE ENCOUNTER
Returning pt's call. Unable to reach pt - LVM and will send [employer] message as well.    BUNNY Rivera, RD, LD  Clinic #: [phone]

## 2022-03-25 LAB
PATH REPORT.ADDENDUM SPEC: NORMAL
PATH REPORT.COMMENTS IMP SPEC: NORMAL
PATH REPORT.COMMENTS IMP SPEC: NORMAL
PATH REPORT.FINAL DX SPEC: NORMAL
PATH REPORT.GROSS SPEC: NORMAL
PATH REPORT.MICROSCOPIC SPEC OTHER STN: NORMAL
PATH REPORT.RELEVANT HX SPEC: NORMAL
PHOTO IMAGE: NORMAL

## 2022-03-27 ENCOUNTER — HEALTH MAINTENANCE LETTER (OUTPATIENT)
Age: 37
End: 2022-03-27

## 2022-03-29 ENCOUNTER — OFFICE VISIT (OUTPATIENT)
Dept: ENDOCRINOLOGY | Facility: CLINIC | Age: 37
End: 2022-03-29
Payer: COMMERCIAL

## 2022-03-29 ENCOUNTER — VIRTUAL VISIT (OUTPATIENT)
Dept: ENDOCRINOLOGY | Facility: CLINIC | Age: 37
End: 2022-03-29
Payer: COMMERCIAL

## 2022-03-29 VITALS
BODY MASS INDEX: 48.18 KG/M2 | OXYGEN SATURATION: 99 % | DIASTOLIC BLOOD PRESSURE: 67 MMHG | HEART RATE: 80 BPM | WEIGHT: 271.9 LBS | HEIGHT: 63 IN | SYSTOLIC BLOOD PRESSURE: 130 MMHG

## 2022-03-29 VITALS — BODY MASS INDEX: 49.93 KG/M2 | WEIGHT: 273 LBS

## 2022-03-29 DIAGNOSIS — Z98.84 S/P LAPAROSCOPIC SLEEVE GASTRECTOMY: Primary | ICD-10-CM

## 2022-03-29 DIAGNOSIS — Z98.84 S/P LAPAROSCOPIC SLEEVE GASTRECTOMY: ICD-10-CM

## 2022-03-29 DIAGNOSIS — Z71.3 NUTRITIONAL COUNSELING: Primary | ICD-10-CM

## 2022-03-29 DIAGNOSIS — E66.01 MORBID OBESITY (H): ICD-10-CM

## 2022-03-29 PROCEDURE — 97803 MED NUTRITION INDIV SUBSEQ: CPT | Mod: 95 | Performed by: DIETITIAN, REGISTERED

## 2022-03-29 PROCEDURE — 99024 POSTOP FOLLOW-UP VISIT: CPT | Performed by: NURSE PRACTITIONER

## 2022-03-29 RX ORDER — HYOSCYAMINE SULFATE 0.125 MG
0.12 TABLET ORAL EVERY 4 HOURS PRN
Qty: 30 TABLET | Refills: 1 | Status: SHIPPED | OUTPATIENT
Start: 2022-03-29 | End: 2022-12-23

## 2022-03-29 ASSESSMENT — PAIN SCALES - GENERAL: PAINLEVEL: NO PAIN (0)

## 2022-03-29 NOTE — LETTER
"3/29/2022       RE: Babita Rivas  [address]  [facility] [address]     Dear Colleague,    Thank you for referring your patient, Babita Rivas, to the [facility] [place] at [facility]. Please see a copy of my visit note below.    Babita Rivas is a 36 year old female who is being evaluated via a billable telephone visit.     The patient has been notified of following:     \"This telephone visit will be conducted via a call between you and your physician/provider. We have found that certain health care needs can be provided without the need for a physical exam.  This service lets us provide the care you need with a short phone conversation.  If a prescription is necessary we can send it directly to your pharmacy.  If lab work is needed we can place an order for that and you can then stop by our lab to have the test done at a later time.    Telephone visits are billed at different rates depending on your insurance coverage. During this emergency period, for some insurers they may be billed the same as an in-person visit.  Please reach out to your insurance provider with any questions.    If during the course of the call the physician/provider feels a telephone visit is not appropriate, you will not be charged for this service.\"    Patient has given verbal consent for Telephone visit?  Yes    How would you like to obtain your AVS? Candy    Phone call duration: 14 minutes    During this virtual visit the patient is located in [place], patient verifies this as the location during the entirety of this visit.     Nutrition Assessment  Reason For Visit:  Babita Rivas is a 36 year old female presenting today for nutrition follow-up, 1 week s/p laparoscopic sleeve gastrectomy with Dr Fitzpatrick 3/22/22.    Pt referred by Arlette Corbett NP on February 15, 2021 and again by Dr. Fitzpatrick following surgery 3/22/22. " "    Anthropometrics  Consult weight: 290 lbs with a BMI 52.26 kg/m2   Day of Surgery Weight: 281 lbs    Estimated body mass index is 49.93 kg/m  as calculated from the following:    Height as of 3/22/22: 1.575 m (5' 2\").    Weight as of this encounter: 123.8 kg (273 lb).    Current Weight: 273 lbs    Weight loss: -17 lbs from initial consult; -8lbs from day of surgery    Current Vitamins/Minerals:   Will look for MVI today     Prefers B12 injection if needed.     Hx of Vit D def    Nutrition History  Pt reports consuming and tolerating bariatric clear and low-fat full liquid diets.    Things going well overall - no questions/concerns.    No N/V.    Consuming yogurt, protein shake, gatorade zero, and hint monsivais.     Staying adequately hydrated she believes - sipping throughout day.    Meeting protein goals.   Drinking 2-3 protein shakes per day. Takes about an hour to finish a shake.       Nutrition Prescription:  Grams Protein: 60 (minimum)  Amount of Fluid: 48-64 oz      Nutrition Diagnosis  Food and nutrition-related knowledge deficit r/t lack of prior exposure to diet advancements beyond bariatric low-fat full liquid diet aeb recent bariatric surgery and pt interest in diet education/review    Intervention  Intervention At Appointment:  Materials/education provided on bariatric pureed and soft diets, protein intake, fluid intake, eating pace, portion control, avoiding excess sugar and fat, recommended vitamin/mineral supplements. Patient demonstrates understanding.     Expected Engagement: good    Goals:  1) Follow bariatric diet advancement schedule   2) Aim for 60+ gm protein/day.  3) Consume 48-64+ oz fluids daily- between meals only once on puree diet  4) Eat slowly (>20 min/meal), chewing well to smooth consistency once on the bariatric soft diet.  5) Limit portions to ~1/4 to 1/2 cup/meal. Stop when satisfied.  6) Start chewable/liquid multivitamin/minerals daily    Example Vitamins: "   https://www.bariatricadvantage.com/multivitamins-comparison    Discount code for Bariatric Advantage vitamin/mineral supplements: UOFMINN (for 15% off order) and SAMPLEFIVE (additional $5 off order)    Post-op Diet Advancement Schedule:  Low-Fat Full Liquid Diet (stage 2): 3/23 - 4/5  Pureed Diet (stage 3): 4/6 - 4/19  Soft Diet (stage 4): 4/20- 5/17  Regular Diet (stage 5): 5/18    Post-op Diet Handouts:  Diet Guidelines after Weight-loss Surgery  [email or web address]     Your Stage 2 Diet: Low-fat Full Liquids  [email or web address]     Your Stage 3 Diet: Pureed Foods  [email or web address]     Pureed Recipes  [email or web address]    Your Stage 4 Diet: Soft Foods  [email or web address]    Your Stage 5 Diet: Regular Foods  [email or web address]    Keeping Track of Fluids  [email or web address]    Exercise Guidelines after Weight Loss Surgery (1st 4-6 weeks)  [email or web address]      Follow-Up: 4/25/22    Time spent with patient: 17 minutes.  BUNNY Corral, RD, LD

## 2022-03-29 NOTE — PROGRESS NOTES
"Postoperative bariatric surgery visit.    Patient underwent sleeve gastrectomy 1 week ago.  3/22/22 Nanette     Tolerating liquids: >60oz water daily + 2 protein shakes   Lightheadedness: rare   Abdominal pain: epigastric pressure improving over time, sometimes feels like a cramp. Not associated with drinking   Bowel movements: last week, senna wasn't helpful, miralax and an enema with good relief, now taking senna with the oxycodone   Fevers/shakes/chills: none  GERD: none Taking omeprazole once daily   Leg/calf pain: none       How many opioid pain medications used after surgery?  What did you do with extra pills?  Were any opioid pain medication refills provided after surgery?  Were any opioid pain medications needed after 30 days postop?    /67 (BP Location: Left arm, Patient Position: Chair, Cuff Size: Adult Large)   Pulse 80   Ht 1.6 m (5' 3\")   Wt 123.3 kg (271 lb 14.4 oz)   LMP 02/28/2022   SpO2 99%   BMI 48.16 kg/m     Wt Readings from Last 5 Encounters:   03/29/22 123.3 kg (271 lb 14.4 oz)   03/29/22 123.8 kg (273 lb)   03/22/22 127.7 kg (281 lb 8.4 oz)   03/04/22 132 kg (291 lb)   03/03/22 129.7 kg (286 lb)      NAD  Overall looks well  Incisions c/d/i; non-tender    Final Diagnosis   STOMACH; SLEEVE PARTIAL GASTRECTOMY:   Gastric body wall with chronic inactive inflammation; no intestinal metaplasia or dysplasia; report of H. pylori immunohistochemistry to follow         Immunostain, with appropriate controls, on block A1 is POSITIVE for H. pylori, confirming this as likely etiology for gastritis.     Plan:  1. RD visit today.  2. Start vitamin supplements per RD directions.  3. Advance diet per RD directions.  4. Follow-up: 4/26/2022  5. Actigall prescription not indicated s/p tashia   6. B12 SL or injection **  7. Pathology reviewed + h.pylori - discussed, will treat in the future   8. Weight loss medications stopped metformin and liraglutide, previously took topiramate   9. Need to restart " statin?       Arlette Corbett CNP  [facility] [place]

## 2022-03-29 NOTE — PATIENT INSTRUCTIONS
"Thank you for allowing us the privilege of caring for you. We hope we provided you with the excellent service you deserve.   Please let us know if there is anything else we can do for you so that we can be sure you are completely satisfied with your care experience.    To ensure the quality of our services you may be receiving a patient satisfaction survey from an independent patient satisfaction monitoring company.    The greatest compliment you can give is a \"Likely to Recommend\"    Your visit was with Arlette Corbett NP today.    Instructions per today's visit:     Aaron Rivas, it was great to visit with you today.  Here is a review of our visit.  If our clinic scheduler is not able to reach you please call [phone] to schedule your next appointments.    -continue omeprazole   -continue senna as needed   -continue working on fluids   -follow up 4/26/22      Information about Video Visits with [email or web address]: video visit information  _________________________________________________________________________________________________________________________________________________________  If you are asked by your clinic team to have your blood pressure checked:  [facility] do offer several locations for blood pressure checks. Please follow the below link to schedule an appointment. Scheduling an appointment at the pharmacy for a blood pressure check is now preferred.    Appointment Plus (appointment-plus.[employer])  _________________________________________________________________________________________________________________________________________________________  Important contact and scheduling information:  Please call our contact center at [phone] to schedule your next appointments.  To find a lab location near you, please call [phone].  For any nursing questions or concerns call Carmina Cavazos LPN at [phone] or Sandrine Salazar RN at [phone]  Please call during clinic hours " Monday through Friday 8:00a - 4:00p if you have questions or you can contact us via In[employer] at anytime and we will reply during clinic hours.    Lab results will be communicated through My Chart or letter (if My Chart not used). Please call the clinic if you have not received communication after 1 week or if you have any questions.?  Clinic Fax: [phone]    _________________________________________________________________________________________________________________________________________________________  Meal Replacement Products:    Here is the link to our new e-store where you can purchase our meal replacement products    [email or web address].com E-Store  biNu/store    The one week starter kit is a great way to sample a variety of products and see what works for you.    If you want more information about the product go to: [employer] Meals  [employer]um[employer]    If you are an employee or [facility] or  [employer] please contact your care team for a 10% estore discount    Free Shipping for orders over $75     Benefits of meal replacements products:    Portion and calorie control  Improved nutrition  Structured eating  Simplified food choices  Avoid contact with trigger foods  _________________________________________________________________________________________________________________________________________________________  Interested in working with a health ?  Health coaches work with you to improve your overall health and wellbeing.  They look at the whole person, and may involve discussion of different areas of life, including, but not limited to the four pillars of health (sleep, exercise, nutrition, and stress management). Discuss with your care team if you would like to start working a health .  Health Coaching-3 Pack: Schedule by calling [phone]    $99 for three health coaching visits    Visits may be done in person or via phone    Coaching is a  partnership between the  and the client; Coaches do not prescribe or diagnose    Coaching helps inspire the client to reach his/her personal goals   _________________________________________________________________________________________________________________________________________________________  24 Week Healthy Lifestyle Plan:    Our mission in the 24-week Healthy Lifestyle Plan is to provide you with individualized care by giving you the tools, education and support you need to lose weight and maintain a healthy lifestyle. In your 24-week journey, you ll be supported by a dedicated weight loss team that includes registered dietitians, medical weight management providers, health coaches, and nurses -- all with special expertise in weight loss -- to help you every step of the way.     Monthly meetings with your registered dietician or medical weight management provider help to review your progress, update your care plan, and make any adjustments needed to ensure success. Between these visits, weekly and bi-weekly health  visits will help you focus on the four pillars of weight loss -- stress, sleep, nutrition, and exercise -- and how you can best adapt each to achieve sustainable weight loss results.    In addition, you will be given exclusive access to online wellbeing classes through [employer].  Your initial visit will be with a medical weight management provider who will help to understand your weight loss goals and ensure this program is the right fit for you. Please let our team know if you are interested in the 24 week plan by sending a message to your care team or calling [phone] to schedule.  _________________________________________________________________________________________________________________________________________________________    COMPREHENSIVE WEIGHT MANAGEMENT PROGRAM  VIRTUAL SUPPORT GROUPS    For Support Group Information:      We offer support groups for patients who  "are working on weight loss and considering, preparing for or have had weight loss surgery.   There is no cost for this opportunity.  You are invited to attend the?Virtual Support Groups?provided by any of the following locations:    1. [facility] via Microsoft Teams with Shayy Hurley RN  2.   [place] via ActSocial with Abel Elizabeth, PhD, LP  3.   [place] via ActSocial with Kira Richardson RN  4.   [facility] via Microsoft Teams with Kira Hartley [facility]    The following Support Group information can also be found on our website:  [email or web address]    [facility] Weight Loss Surgery Support Group    [facility] Weight Loss Surgery Support Group  The support group is a patient-lead forum that meets monthly to share experiences, encouragement and education. It is open to those who have had weight loss surgery, are scheduled for surgery, and those who are considering surgery.   WHEN: This group meets on the 3rd Wednesday of each month from 5:00PM - 6:00PM virtually using Microsoft Teams.   FACILITATOR: Led by Shayy Hurley RD, LD, RN, the program's Clinical Coordinator.   TO REGISTER: Please contact the clinic via [employer] or call the nurse line directly at [phone] to inform our staff that you would like an invite sent to you and to let us know the email you would like the invite sent to. Prior to the meeting, a link with directions on how to join the meeting will be sent to you.    2022 Meetings  January 19: \"Let's Talk\" a time for the group to share.  February 16: \"Let's Talk\" a time for the group to share.  March 16: Guest Speakers: Psychologists, Ilene Hoffman, PhD,LP and Cherelle Art PsLenore,LP  April 20: Guest Speaker: Health , Anabela Oliveira, [facility], CPT  May 18: Guest Speaker: Dietitian, Jeffrey Roth, SCOTTY, LP  Benita 15: \"Let's Talk\" a time for the group to share.  July 20: \"Let's Talk\" a time for the " "group to share.  August 17: TBA  September 21: TBA  October 19: Guest Speaker: Dr Eb Begum MD Pulmonologist and Sleep Medicine Physician, \"Getting a Good Night's Sleep\".  November 16: TBA  December 21: TBA    [facility] Support Groups    Connections: Bariatric Care Support Group?  This is open to all [facility] (and those external to this program) pre- and post- operative bariatric surgery patients as well as their support system.   WHEN: This group meets the 2nd Tuesday of each month from 6:30 PM - 8:00 PM virtually using Microsoft Teams.   FACILITATOR: Led by Abel Elizabeth, Ph.D who is a Licensed Psychologist with the [facility] Comprehensive Weight Management Program.   TO REGISTER: Please send an email to Abel Elizabeth, Ph.D., LP at?[email or web address]?if you would like an invitation to the group and to learn about using Microsoft Teams.    2022 Meetings  January 11: Alice Tellez, PharmD, Pharmacy Resident at [facility], \"Medications and Bariatric Surgery\".  February 8: Open Forum  March 8: Sloan Albrecht MD, \"Exercise and Bariatric Surgery\".  April 12  May 10  Benita 14    Connections: Post-Operative Bariatric Surgery Support Group  This is a support group for [facility] bariatric patients (and those external to [facility]) who have had bariatric surgery and are at least 3 months post-surgery.  WHEN: This support group meets the 4th Wednesday of the month from 11:00 AM - 12:00 PM virtually using Microsoft Teams.   FACILITATOR: Led by Certified Bariatric Nurse, Kira Richardson RN.   TO REGISTER: Please send an email to Kira at [email or web address] if you would like an invitation to the group and to learn about using Microsoft Teams.    2022 Meetings  January 26  February 23  March 23  April 27  May 25  Benita 22    [facility] Healthy Lifestyle Virtual Support Group    Healthy Lifestyle " "Virtual Support Group?  This is 60 minutes of small group guided discussion, support and resources. All are welcome who want a healthy lifestyle.  WHEN: This group meets monthly on a Friday from 12:30 PM - 1:30 PM virtually using Microsoft Teams.   FACILITATOR: Led by National Board Certified Health and , Kira Hartley [facility].   TO REGISTER: Please send an email to Kira at?[email or web address] to receive monthly invites to the group or if you have any questions about having a health .  Prior to the meeting, a link with directions on how to join the meeting will be sent to you.    2022 Meetings  January 21: Mary Fihs MS, RN, CIC, CBN, \"Healthy Habits\"  February 25: Open Forum  March 18: \"Setting Limits and Boundaries\"  April 29: Yas Dudley RD, \"Meal Planning Made Easy\"  May 20: Open Forum  Benita: To be determined  ____________________________________________________________________________________________________________________________________________________________________________  [facility] Get Moving Program  Our team of physical therapists is trained to help you understand and take control of your condition. They will perform a thorough evaluation to determine your ability for activity and develop a customized plan to fit your goals and physical ability.  Scheduling: Unsure if the Get Moving program is right for you? Discuss the program with your medical provider or diabetes educator. You can also call us at [phone] to ask questions or schedule an appointment.   [facility] Get Moving Program  ____________________________________________________________________________________________________________________________________________________________________________  [facility] Diabetes Prevention Program (DPP)  If you have prediabetes and Medicare please contact us via MyChart to learn more about the Diabetes Prevention Program (DPP)  Program Details:  [facility] offers the year-long Diabetes Prevention Program (DPP). The program helps you to make lifestyle changes that prevent or delay type 2 diabetes by supporting healthy eating, increased physical activity, stress reduction and use of coping skills.   On average, previous [facility] DPP cohorts have lost and maintained at least 5% of their starting weight throughout the program and averaged more than 150 minutes of physical activity per week.  Participants meet weekly for one-hour group sessions over sixteen weeks, every other week for the next 8 weeks, and monthly for the last six months.   A year-long maintenance program is also available for participants who complete the first year.   Location & Cost:   During the COVID-19 Public Health Emergency, the program is offered virtually. When in-person classes can resume, they will be held at [facility].  For people with Medicare, the program is covered in full. A self-pay option will also be available for those with non-Medicare insurance plans.   _________________________________________________________________________________________________________________________________________________________  Bluetooth Scale:    We hope to provide you with high quality virtual healthcare visits while social distancing for COVID-19 is necessary, as well as in the future when virtual visits may be more convenient for you.     Our technology team made it possible for Bluetooth scales to send weight measurements to our electronic medical record. This allows weights from you weighing at home to securely flow into the medical record, which will improve telephone and virtual visits.   Additionally, studies have shown that adults actually lose more weight when their weights are automatically sent to someone else, and also that this process is not stressful for those adults.    Below is a link for purchasing the scale, with a discount  code for our patients. You may call your insurance company to see if they will reimburse you for the cost of the scale, as a piece of durable medical equipment. The scales only go up to a weight of 400 pounds. This is an issue and we are working with the developer on increasing this. We found no scales that go over 400lb that have blue-tooth for connecting to [employer].    Scale to purchase: the [employer]  Body  Scale: https://www.[employer].SaveOnEnergy.com/us/en/body/shop?gclid=[email or web address]&gclsrc=aw.ds    Discount Code: We have a discount code for our patients to bring the cost down to $50, Discount code is: [place]_Scale_20%off  _______________________________________________________________________________________________________________________________________________________________________________    To work with a Behavioral Health Psychologist:    Call to schedule:    Jasmeet Helton - [phone]  Obed Basurto - [phone]  Marina Dias - [phone]  Elizabeth Hobbs - [phone]   Doris Wilburn PhD (cannot accept Medicare) [phone]        Thank you,   [facility] Comprehensive Weight Management Team

## 2022-03-29 NOTE — PROGRESS NOTES
"Babita Rivas is a 36 year old female who is being evaluated via a billable telephone visit.     The patient has been notified of following:     \"This telephone visit will be conducted via a call between you and your physician/provider. We have found that certain health care needs can be provided without the need for a physical exam.  This service lets us provide the care you need with a short phone conversation.  If a prescription is necessary we can send it directly to your pharmacy.  If lab work is needed we can place an order for that and you can then stop by our lab to have the test done at a later time.    Telephone visits are billed at different rates depending on your insurance coverage. During this emergency period, for some insurers they may be billed the same as an in-person visit.  Please reach out to your insurance provider with any questions.    If during the course of the call the physician/provider feels a telephone visit is not appropriate, you will not be charged for this service.\"    Patient has given verbal consent for Telephone visit?  Yes    How would you like to obtain your AVS? MyChart    Phone call duration: 14 minutes    During this virtual visit the patient is located in [place], patient verifies this as the location during the entirety of this visit.     Nutrition Assessment  Reason For Visit:  Babita Rivas is a 36 year old female presenting today for nutrition follow-up, 1 week s/p laparoscopic sleeve gastrectomy with Dr Fitzpatrick 3/22/22.    Pt referred by Arlette Corbett NP on February 15, 2021 and again by Dr. Fitzpatrick following surgery 3/22/22.     Anthropometrics  Consult weight: 290 lbs with a BMI 52.26 kg/m2   Day of Surgery Weight: 281 lbs    Estimated body mass index is 49.93 kg/m  as calculated from the following:    Height as of 3/22/22: 1.575 m (5' 2\").    Weight as of this encounter: 123.8 kg (273 lb).    Current Weight: 273 lbs    Weight loss: -17 lbs from initial consult; " -8lbs from day of surgery    Current Vitamins/Minerals:   Will look for MVI today     Prefers B12 injection if needed.     Hx of Vit D def    Nutrition History  Pt reports consuming and tolerating bariatric clear and low-fat full liquid diets.    Things going well overall - no questions/concerns.    No N/V.    Consuming yogurt, protein shake, gatorade zero, and hint monsivais.     Staying adequately hydrated she believes - sipping throughout day.    Meeting protein goals.   Drinking 2-3 protein shakes per day. Takes about an hour to finish a shake.       Nutrition Prescription:  Grams Protein: 60 (minimum)  Amount of Fluid: 48-64 oz      Nutrition Diagnosis  Food and nutrition-related knowledge deficit r/t lack of prior exposure to diet advancements beyond bariatric low-fat full liquid diet aeb recent bariatric surgery and pt interest in diet education/review    Intervention  Intervention At Appointment:  Materials/education provided on bariatric pureed and soft diets, protein intake, fluid intake, eating pace, portion control, avoiding excess sugar and fat, recommended vitamin/mineral supplements. Patient demonstrates understanding.     Expected Engagement: good    Goals:  1) Follow bariatric diet advancement schedule   2) Aim for 60+ gm protein/day.  3) Consume 48-64+ oz fluids daily- between meals only once on puree diet  4) Eat slowly (>20 min/meal), chewing well to smooth consistency once on the bariatric soft diet.  5) Limit portions to ~1/4 to 1/2 cup/meal. Stop when satisfied.  6) Start chewable/liquid multivitamin/minerals daily    Example Vitamins:   https://www.bariatricadvantage.com/multivitamins-comparison    Discount code for Bariatric Advantage vitamin/mineral supplements: UOFMINN (for 15% off order) and SAMPLEFIVE (additional $5 off order)    Post-op Diet Advancement Schedule:  Low-Fat Full Liquid Diet (stage 2): 3/23 - 4/5  Pureed Diet (stage 3): 4/6 - 4/19  Soft Diet (stage 4): 4/20- 5/17  Regular  Diet (stage 5): 5/18    Post-op Diet Handouts:  Diet Guidelines after Weight-loss Surgery  [email or web address]     Your Stage 2 Diet: Low-fat Full Liquids  [email or web address]     Your Stage 3 Diet: Pureed Foods  [email or web address]     Pureed Recipes  [email or web address]    Your Stage 4 Diet: Soft Foods  [email or web address]    Your Stage 5 Diet: Regular Foods  [email or web address]    Keeping Track of Fluids  [email or web address]    Exercise Guidelines after Weight Loss Surgery (1st 4-6 weeks)  [email or web address]      Follow-Up: 4/25/22    Time spent with patient: 17 minutes.  BUNNY Corral, RD, LD

## 2022-03-29 NOTE — ASSESSMENT & PLAN NOTE
1 week postop. Doing well. Pain improving. No reflux, continue PPI. Some constipation- now improved with miralax and enema x 1. Adequate protein and hydration. Incisions healing well.     +hpylori discussed today   S/p tashia

## 2022-03-29 NOTE — PATIENT INSTRUCTIONS
Goals:  1) Follow bariatric diet advancement schedule   2) Aim for 60+ gm protein/day.  3) Consume 48-64+ oz fluids daily- between meals only once on puree diet  4) Eat slowly (>20 min/meal), chewing well to smooth consistency once on the bariatric soft diet.  5) Limit portions to ~1/4 to 1/2 cup/meal. Stop when satisfied.  6) Start chewable/liquid multivitamin/minerals daily    Example Vitamins:   https://www.bariatricViva Dengi.com/multivitamins-comparison    Discount code for Bariatric Advantage vitamin/mineral supplements: UOFMINN (for 15% off order) and SAMPLEFIVE (additional $5 off order)    Post-op Diet Advancement Schedule:  Low-Fat Full Liquid Diet (stage 2): 3/23 - 4/5  Pureed Diet (stage 3): 4/6 - 4/19  Soft Diet (stage 4): 4/20- 5/17  Regular Diet (stage 5): 5/18    Post-op Diet Handouts:  Diet Guidelines after Weight-loss Surgery  [email or web address]     Your Stage 2 Diet: Low-fat Full Liquids  [email or web address]     Your Stage 3 Diet: Pureed Foods  [email or web address]     Pureed Recipes  [email or web address]    Your Stage 4 Diet: Soft Foods  [email or web address]    Your Stage 5 Diet: Regular Foods  [email or web address]    Keeping Track of Fluids  [email or web address]    Exercise Guidelines after Weight Loss Surgery (1st 4-6 weeks)  [email or web address]    Follow-Up: 4/25/22    BUNNY Rivera, RD, LD  Clinic #: [phone]

## 2022-03-29 NOTE — NURSING NOTE
"Chief Complaint   Patient presents with     RECHECK     Follow-up 1 Week Post-Op Laparscopic Gastric Sleeve Surgery       Vitals:    03/29/22 1352   BP: 130/67   BP Location: Left arm   Patient Position: Chair   Cuff Size: Adult Large   Pulse: 80   SpO2: 99%   Weight: 123.3 kg (271 lb 14.4 oz)   Height: 1.6 m (5' 3\")       Body mass index is 48.16 kg/m .                            "

## 2022-03-29 NOTE — LETTER
"3/29/2022       RE: Babita Rivas  [address]  [place] [address]     Dear Colleague,    Thank you for referring your patient, Babita Rivas, to the [facility] [place] at [facility]. Please see a copy of my visit note below.    Postoperative bariatric surgery visit.    Patient underwent sleeve gastrectomy 1 week ago.  3/22/22 Nanette     Tolerating liquids: >60oz water daily + 2 protein shakes   Lightheadedness: rare   Abdominal pain: epigastric pressure improving over time, sometimes feels like a cramp. Not associated with drinking   Bowel movements: last week, senna wasn't helpful, miralax and an enema with good relief, now taking senna with the oxycodone   Fevers/shakes/chills: none  GERD: none Taking omeprazole once daily   Leg/calf pain: none       How many opioid pain medications used after surgery?  What did you do with extra pills?  Were any opioid pain medication refills provided after surgery?  Were any opioid pain medications needed after 30 days postop?    /67 (BP Location: Left arm, Patient Position: Chair, Cuff Size: Adult Large)   Pulse 80   Ht 1.6 m (5' 3\")   Wt 123.3 kg (271 lb 14.4 oz)   LMP 02/28/2022   SpO2 99%   BMI 48.16 kg/m     Wt Readings from Last 5 Encounters:   03/29/22 123.3 kg (271 lb 14.4 oz)   03/29/22 123.8 kg (273 lb)   03/22/22 127.7 kg (281 lb 8.4 oz)   03/04/22 132 kg (291 lb)   03/03/22 129.7 kg (286 lb)      NAD  Overall looks well  Incisions c/d/i; non-tender    Final Diagnosis   STOMACH; SLEEVE PARTIAL GASTRECTOMY:   Gastric body wall with chronic inactive inflammation; no intestinal metaplasia or dysplasia; report of H. pylori immunohistochemistry to follow         Immunostain, with appropriate controls, on block A1 is POSITIVE for H. pylori, confirming this as likely etiology for gastritis.     Plan:  1. RD visit today.  2. Start vitamin supplements per RD " directions.  3. Advance diet per RD directions.  4. Follow-up: 4/26/2022  5. Actigall prescription not indicated s/p tashia   6. B12 SL or injection **  7. Pathology reviewed + h.pylori - discussed, will treat in the future   8. Weight loss medications stopped metformin and liraglutide, previously took topiramate   9. Need to restart statin?       Arlette Corbett, CNP  [facility] [place]

## 2022-04-25 ENCOUNTER — VIRTUAL VISIT (OUTPATIENT)
Dept: ENDOCRINOLOGY | Facility: CLINIC | Age: 37
End: 2022-04-25
Payer: COMMERCIAL

## 2022-04-25 DIAGNOSIS — Z98.84 S/P LAPAROSCOPIC SLEEVE GASTRECTOMY: ICD-10-CM

## 2022-04-25 DIAGNOSIS — E55.9 VITAMIN D DEFICIENCY: ICD-10-CM

## 2022-04-25 DIAGNOSIS — E11.9 TYPE 2 DIABETES MELLITUS WITHOUT COMPLICATION, WITHOUT LONG-TERM CURRENT USE OF INSULIN (H): ICD-10-CM

## 2022-04-25 DIAGNOSIS — Z71.3 NUTRITIONAL COUNSELING: Primary | ICD-10-CM

## 2022-04-25 DIAGNOSIS — E66.01 MORBID OBESITY (H): ICD-10-CM

## 2022-04-25 PROCEDURE — 97803 MED NUTRITION INDIV SUBSEQ: CPT | Mod: 95 | Performed by: DIETITIAN, REGISTERED

## 2022-04-25 RX ORDER — CYANOCOBALAMIN 1000 UG/ML
1 INJECTION, SOLUTION INTRAMUSCULAR; SUBCUTANEOUS
Qty: 1 ML | Refills: 11 | Status: SHIPPED | OUTPATIENT
Start: 2022-04-25 | End: 2023-05-10

## 2022-04-25 NOTE — LETTER
"4/25/2022       RE: Babita Rivas  [address]  [facility] [address]     Dear Colleague,    Thank you for referring your patient, Babita Rivas, to the [facility] [place] at [facility]. Please see a copy of my visit note below.    Babita Rivas is a 37 year old female who is being evaluated via a billable telephone visit.     The patient has been notified of following:     \"This telephone visit will be conducted via a call between you and your physician/provider. We have found that certain health care needs can be provided without the need for a physical exam.  This service lets us provide the care you need with a short phone conversation.  If a prescription is necessary we can send it directly to your pharmacy.  If lab work is needed we can place an order for that and you can then stop by our lab to have the test done at a later time.    Telephone visits are billed at different rates depending on your insurance coverage. During this emergency period, for some insurers they may be billed the same as an in-person visit.  Please reach out to your insurance provider with any questions.    If during the course of the call the physician/provider feels a telephone visit is not appropriate, you will not be charged for this service.\"    Patient has given verbal consent for Telephone visit?  Yes    How would you like to obtain your AVS? Candy    Phone call duration: 20 minutes    During this virtual visit the patient is located in [place], patient verifies this as the location during the entirety of this visit.         Nutrition Reassessment  Reason For Visit:  Babita Rivas is a 37 year old female presenting today for nutrition follow-up, 1 month s/p laparoscopic sleeve gastrectomy with Dr Fitzpatrick 3/22/22.     Pt referred by Arlette Corbett NP on February 15, 2021 and again by Dr. Fitzpatrick following surgery 3/22/22. " "     Anthropometrics  Consult weight: 290 lbs with a BMI 52.26 kg/m2   Day of Surgery Weight: 281 lbs    Estimated body mass index is 48.16 kg/m  as calculated from the following:    Height as of 3/29/22: 1.6 m (5' 3\").    Weight as of 3/29/22: 123.3 kg (271 lb 14.4 oz).    Current Weight: forgot to ask, seeing Arlette tomorrow.    Current Vitamins/Minerals:   [place] with iron - has been taking 1 per day, will up to 2     Prefers B12 injection if needed.      Hx of Vit D def    Nutrition History:  Pt reports things are going well.  No N/V or dysphagia.    Recent recall  B - part of an omelet  L - yogurt  D - 1 cluster of crab leg, 1 spoonful cabbage   2 protein shakes     Fluids: 2 protein shakes/day (60 grams protein), gatorade zero, water    Pt asked when she is able to have alcohol - discussed. Pt aware of risks. Reports she is not concerned about alcoholism -  a    Plans to get a gym membership.    Progress with Previous Goals:  1) Follow bariatric diet advancement schedule - Met  2) Aim for 60+ gm protein/day. - Met  3) Consume 48-64+ oz fluids daily- between meals only once on puree diet - Met  4) Eat slowly (>20 min/meal), chewing well to smooth consistency once on the bariatric soft diet. - Met  5) Limit portions to ~1/4 to 1/2 cup/meal. Stop when satisfied. - Met  6) Start chewable/liquid multivitamin/minerals daily - Met      Nutrition Prescription:  Grams Protein: 60 (minimum)   Amount of Fluid: 48-64 oz    Nutrition Diagnosis  Previous: Food and nutrition-related knowledge deficit r/t lack of prior exposure to diet advancements beyond bariatric low-fat full liquid diet aeb recent bariatric surgery and pt interest in diet education/review     Current: Food and nutrition-related knowledge deficit r/t lack of prior exposure to diet advancements beyond bariatric pureed diet aeb recent bariatric surgery and pt interest in diet education/review     Intervention  Materials/Education provided " on bariatric soft and regular consistency diets, protein intake, fluid intake, eating pace, chewing foods well, portion control, sugar/fat intake, recommended vitamin/mineral supplements. Patient demonstrates understanding.       Expected Engagement: good    Goals:  1) Follow diet advancement shcedule  2) Consume 60+ grams of protein/day.  3) Sip on 48-64+ oz of fluids/day- between meals only.  4) Eat slowly (>20 min/meal), chewing foods well (to applesauce-like consistency).  5) Limit portions to ~1/2 cup/meal until 3 months post op  6) Schedule 3 month provider/RD appointments  7) Get 3 month labs done before next appointments   8) Take vitamins/minerals as recommended    [place] 2 per day   B12 injection monthly   Calcium citrate chewable (want 3529-4632 mg)    Vitamin D (want at least 3000 IUs/day total between all sources)     Post-op Diet Advancement Schedule:  Soft Diet (stage 4): 4/20- 5/17  Regular Diet (stage 5): 5/18    Post-op Diet Handouts:  Diet Guidelines after Weight-loss Surgery  [email or web address]     Your Stage 4 Diet: Soft Foods  [email or web address]    Your Stage 5 Diet: Regular Foods  [email or web address]    Keeping Track of Fluids  [email or web address]    Exercises after Weight Loss Surgery (strengthening, when no weight lifting restrictions)  [email or web address]         Follow-Up: 3 months post op/prn    Time spent with patient: 20 minutes.  BUNNY Corral, RD LD

## 2022-04-25 NOTE — Clinical Note
You see her tomorrow. Doing well. I wrote B12 prescription today. Looks like needles aren't covered though. She was wondering if her insulin needles would work? She also will need someone to show her how to do the B12 injections. Thanks!

## 2022-04-25 NOTE — PROGRESS NOTES
"Babita Rivas is a 37 year old female who is being evaluated via a billable telephone visit.     The patient has been notified of following:     \"This telephone visit will be conducted via a call between you and your physician/provider. We have found that certain health care needs can be provided without the need for a physical exam.  This service lets us provide the care you need with a short phone conversation.  If a prescription is necessary we can send it directly to your pharmacy.  If lab work is needed we can place an order for that and you can then stop by our lab to have the test done at a later time.    Telephone visits are billed at different rates depending on your insurance coverage. During this emergency period, for some insurers they may be billed the same as an in-person visit.  Please reach out to your insurance provider with any questions.    If during the course of the call the physician/provider feels a telephone visit is not appropriate, you will not be charged for this service.\"    Patient has given verbal consent for Telephone visit?  Yes    How would you like to obtain your AVS? MyChart    Phone call duration: 20 minutes    During this virtual visit the patient is located in [place], patient verifies this as the location during the entirety of this visit.         Nutrition Reassessment  Reason For Visit:  Babita Rivas is a 37 year old female presenting today for nutrition follow-up, 1 month s/p laparoscopic sleeve gastrectomy with Dr Fitzpatrick 3/22/22.     Pt referred by Arlette Corbett NP on February 15, 2021 and again by Dr. Fitzpatrick following surgery 3/22/22.      Anthropometrics  Consult weight: 290 lbs with a BMI 52.26 kg/m2   Day of Surgery Weight: 281 lbs    Estimated body mass index is 48.16 kg/m  as calculated from the following:    Height as of 3/29/22: 1.6 m (5' 3\").    Weight as of 3/29/22: 123.3 kg (271 lb 14.4 oz).    Current Weight: forgot to ask, seeing Arlette " tomorrow.    Current Vitamins/Minerals:   [place] with iron - has been taking 1 per day, will up to 2     Prefers B12 injection if needed.      Hx of Vit D def    Nutrition History:  Pt reports things are going well.  No N/V or dysphagia.    Recent recall  B - part of an omelet  L - yogurt  D - 1 cluster of crab leg, 1 spoonful cabbage   2 protein shakes     Fluids: 2 protein shakes/day (60 grams protein), gatorade zero, water    Pt asked when she is able to have alcohol - discussed. Pt aware of risks. Reports she is not concerned about alcoholism -  a    Plans to get a gym membership.    Progress with Previous Goals:  1) Follow bariatric diet advancement schedule - Met  2) Aim for 60+ gm protein/day. - Met  3) Consume 48-64+ oz fluids daily- between meals only once on puree diet - Met  4) Eat slowly (>20 min/meal), chewing well to smooth consistency once on the bariatric soft diet. - Met  5) Limit portions to ~1/4 to 1/2 cup/meal. Stop when satisfied. - Met  6) Start chewable/liquid multivitamin/minerals daily - Met      Nutrition Prescription:  Grams Protein: 60 (minimum)   Amount of Fluid: 48-64 oz    Nutrition Diagnosis  Previous: Food and nutrition-related knowledge deficit r/t lack of prior exposure to diet advancements beyond bariatric low-fat full liquid diet aeb recent bariatric surgery and pt interest in diet education/review     Current: Food and nutrition-related knowledge deficit r/t lack of prior exposure to diet advancements beyond bariatric pureed diet aeb recent bariatric surgery and pt interest in diet education/review     Intervention  Materials/Education provided on bariatric soft and regular consistency diets, protein intake, fluid intake, eating pace, chewing foods well, portion control, sugar/fat intake, recommended vitamin/mineral supplements. Patient demonstrates understanding.       Expected Engagement: good    Goals:  1) Follow diet advancement shcedule  2) Consume 60+  grams of protein/day.  3) Sip on 48-64+ oz of fluids/day- between meals only.  4) Eat slowly (>20 min/meal), chewing foods well (to applesauce-like consistency).  5) Limit portions to ~1/2 cup/meal until 3 months post op  6) Schedule 3 month provider/RD appointments  7) Get 3 month labs done before next appointments   8) Take vitamins/minerals as recommended    [place] 2 per day   B12 injection monthly   Calcium citrate chewable (want 3440-4780 mg)    Vitamin D (want at least 3000 IUs/day total between all sources)     Post-op Diet Advancement Schedule:  Soft Diet (stage 4): 4/20- 5/17  Regular Diet (stage 5): 5/18    Post-op Diet Handouts:  Diet Guidelines after Weight-loss Surgery  [email or web address]     Your Stage 4 Diet: Soft Foods  [email or web address]    Your Stage 5 Diet: Regular Foods  [email or web address]    Keeping Track of Fluids  [email or web address]    Exercises after Weight Loss Surgery (strengthening, when no weight lifting restrictions)  [email or web address]         Follow-Up: 3 months post op/prn    Time spent with patient: 20 minutes.  BUNNY Corral, RD LD

## 2022-04-25 NOTE — PATIENT INSTRUCTIONS
Goals:  1) Follow diet advancement shcedule  2) Consume 60+ grams of protein/day.  3) Sip on 48-64+ oz of fluids/day- between meals only.  4) Eat slowly (>20 min/meal), chewing foods well (to applesauce-like consistency).  5) Limit portions to ~1/2 cup/meal until 3 months post op  6) Schedule 3 month provider/RD appointments  7) Get 3 month labs done before next appointments   8) Take vitamins/minerals as recommended    [place] 2 per day   B12 injection monthly   Calcium citrate chewable (want 4002-3213 mg)    Vitamin D (want at least 3000 IUs/day total between all sources)     Post-op Diet Advancement Schedule:  Soft Diet (stage 4): 4/20- 5/17  Regular Diet (stage 5): 5/18    Post-op Diet Handouts:  Diet Guidelines after Weight-loss Surgery  [email or web address]     Your Stage 4 Diet: Soft Foods  [email or web address]    Your Stage 5 Diet: Regular Foods  [email or web address]    Keeping Track of Fluids  [email or web address]    Exercises after Weight Loss Surgery (strengthening, when no weight lifting restrictions)  [email or web address]         Follow-Up: 3 months post op/BUNNY Archuleta, RD, LD  Clinic #: [phone]

## 2022-04-26 ENCOUNTER — VIRTUAL VISIT (OUTPATIENT)
Dept: ENDOCRINOLOGY | Facility: CLINIC | Age: 37
End: 2022-04-26
Payer: COMMERCIAL

## 2022-04-26 VITALS — WEIGHT: 258 LBS | BODY MASS INDEX: 45.7 KG/M2

## 2022-04-26 DIAGNOSIS — Z98.84 S/P LAPAROSCOPIC SLEEVE GASTRECTOMY: Primary | ICD-10-CM

## 2022-04-26 DIAGNOSIS — A04.8 H. PYLORI INFECTION: ICD-10-CM

## 2022-04-26 PROCEDURE — 99024 POSTOP FOLLOW-UP VISIT: CPT | Mod: 95 | Performed by: NURSE PRACTITIONER

## 2022-04-26 RX ORDER — TETRACYCLINE HYDROCHLORIDE 500 MG/1
500 CAPSULE ORAL 4 TIMES DAILY
Qty: 40 CAPSULE | Refills: 0 | Status: SHIPPED | OUTPATIENT
Start: 2022-04-26 | End: 2022-05-06

## 2022-04-26 RX ORDER — BISMUTH SUBSALICYLATE 262 MG/1
1 TABLET, CHEWABLE ORAL 4 TIMES DAILY
Qty: 40 TABLET | Refills: 0 | Status: SHIPPED | OUTPATIENT
Start: 2022-04-26 | End: 2022-05-06

## 2022-04-26 RX ORDER — METRONIDAZOLE 250 MG/1
250 TABLET ORAL 4 TIMES DAILY
Qty: 40 TABLET | Refills: 0 | Status: SHIPPED | OUTPATIENT
Start: 2022-04-26 | End: 2022-05-06

## 2022-04-26 NOTE — PATIENT INSTRUCTIONS
"Thank you for allowing us the privilege of caring for you. We hope we provided you with the excellent service you deserve.   Please let us know if there is anything else we can do for you so that we can be sure you are completely satisfied with your care experience.    To ensure the quality of our services you may be receiving a patient satisfaction survey from an independent patient satisfaction monitoring company.    The greatest compliment you can give is a \"Likely to Recommend\"    Your visit was with Arlette Corbett NP today.    Instructions per today's visit:     Aaron Rivas, it was great to visit with you today.  Here is a review of our visit.  If our clinic scheduler is not able to reach you please call [phone] to schedule your next appointments.    Plan:  2 flinstone vitamins  b12 injection monthly   Calcium citrate daily - separate from multivitamin   Continue omeprazole through treatment for H.Pylori- then okay to taper off (every other day x 2 weeks, every 2 days x 2 weeks then stop)       Information about Video Visits with [employer]: video visit information  _________________________________________________________________________________________________________________________________________________________  If you are asked by your clinic team to have your blood pressure checked:  [facility] do offer several locations for blood pressure checks. Please follow the below link to schedule an appointment. Scheduling an appointment at the pharmacy for a blood pressure check is now preferred.    Appointment Plus (appointment-[email or web address].StartMe)  _________________________________________________________________________________________________________________________________________________________  Important contact and scheduling information:  Please call our contact center at [phone] to schedule your next appointments.  To find a lab location near you, please call [phone].  For " any nursing questions or concerns call Carmina Cavazos LPN at [phone] or Sandrine Salazar RN at [phone]  Please call during clinic hours Monday through Friday 8:00a - 4:00p if you have questions or you can contact us via [employer] at anytime and we will reply during clinic hours.    Lab results will be communicated through My Chart or letter (if My Chart not used). Please call the clinic if you have not received communication after 1 week or if you have any questions.?  Clinic Fax: [phone]    _________________________________________________________________________________________________________________________________________________________  Meal Replacement Products:    Here is the link to our new e-store where you can purchase our meal replacement products    [facility] E-Store  [email or web address]    The one week starter kit is a great way to sample a variety of products and see what works for you.    If you want more information about the product go to: Fresh 3Funnel    If you are an employee or [facility] or [facility] please contact your care team for a 10% estore discount    Free Shipping for orders over $75     Benefits of meal replacements products:    Portion and calorie control  Improved nutrition  Structured eating  Simplified food choices  Avoid contact with trigger foods  _________________________________________________________________________________________________________________________________________________________  Interested in working with a health ?  Health coaches work with you to improve your overall health and wellbeing.  They look at the whole person, and may involve discussion of different areas of life, including, but not limited to the four pillars of health (sleep, exercise, nutrition, and stress management). Discuss with your care team if you would like to start working a health .  Health  Coaching-3 Pack: Schedule by calling [phone]    $99 for three health coaching visits    Visits may be done in person or via phone    Coaching is a partnership between the  and the client; Coaches do not prescribe or diagnose    Coaching helps inspire the client to reach his/her personal goals   _________________________________________________________________________________________________________________________________________________________  24 Week Healthy Lifestyle Plan:    Our mission in the 24-week Healthy Lifestyle Plan is to provide you with individualized care by giving you the tools, education and support you need to lose weight and maintain a healthy lifestyle. In your 24-week journey, you ll be supported by a dedicated weight loss team that includes registered dietitians, medical weight management providers, health coaches, and nurses -- all with special expertise in weight loss -- to help you every step of the way.     Monthly meetings with your registered dietician or medical weight management provider help to review your progress, update your care plan, and make any adjustments needed to ensure success. Between these visits, weekly and bi-weekly health  visits will help you focus on the four pillars of weight loss -- stress, sleep, nutrition, and exercise -- and how you can best adapt each to achieve sustainable weight loss results.    In addition, you will be given exclusive access to online wellbeing classes through [employer].  Your initial visit will be with a medical weight management provider who will help to understand your weight loss goals and ensure this program is the right fit for you. Please let our team know if you are interested in the 24 week plan by sending a message to your care team or calling [phone] to  "schedule.  _________________________________________________________________________________________________________________________________________________________    COMPREHENSIVE WEIGHT MANAGEMENT PROGRAM  VIRTUAL SUPPORT GROUPS    For Support Group Information:      We offer support groups for patients who are working on weight loss and considering, preparing for or have had weight loss surgery.   There is no cost for this opportunity.  You are invited to attend the?Virtual Support Groups?provided by any of the following locations:    [facility] via Microsoft Teams with Shayy Hurley RN  2.   [place] via Recognition PRO with Abel Elizabeth, PhD, LP  3.   [place] via Recognition PRO with Kira Richardson RN  4.   [facility] via Adeze Teams with Kira Hartley [facility]    The following Support Group information can also be found on our website:  [email or web address]    [facility] Weight Loss Surgery Support Group    [facility] Weight Loss Surgery Support Group  The support group is a patient-lead forum that meets monthly to share experiences, encouragement and education. It is open to those who have had weight loss surgery, are scheduled for surgery, and those who are considering surgery.   WHEN: This group meets on the 3rd Wednesday of each month from 5:00PM - 6:00PM virtually using Microsoft Teams.   FACILITATOR: Led by Shayy Hurley, SCOTTY, LD, RN, the program's Clinical Coordinator.   TO REGISTER: Please contact the clinic via [facility] or call the nurse line directly at [phone] to inform our staff that you would like an invite sent to you and to let us know the email you would like the invite sent to. Prior to the meeting, a link with directions on how to join the meeting will be sent to you.    2022 Meetings  January 19: \"Let's Talk\" a time for the group to share.  February 16: \"Let's Talk\" a time for the group to " "share.  March 16: Guest Speakers: Psychologists, Ilene Hoffman, PhD,LP and Cherelle Art PsLenore,  April 20: Guest Speaker: Health , Anabela Oliveira, [facility],CHES, [facility]  May 18: Guest Speaker: DietitianJeffrey, SCOTTY, LP  Benita 15: \"Let's Talk\" a time for the group to share.  July 20: \"Let's Talk\" a time for the group to share.  August 17: TBA  September 21: TBA  October 19: Guest Speaker: Dr Eb Begum MD Pulmonologist and Sleep Medicine Physician, \"Getting a Good Night's Sleep\".  November 16: TBA  December 21: TBA    [facility] Support Groups    Connections: Bariatric Care Support Group?  This is open to all [facility] (and those external to this program) pre- and post- operative bariatric surgery patients as well as their support system.   WHEN: This group meets the 2nd Tuesday of each month from 6:30 PM - 8:00 PM virtually using Microsoft Teams.   FACILITATOR: Led by Abel Elizabeth, Ph.D who is a Licensed Psychologist with the [facility] Comprehensive Weight Management Program.   TO REGISTER: Please send an email to Abel Elizabeth, Ph.D.,  at?[email or web address]?if you would like an invitation to the group and to learn about using Microsoft Teams.    2022 Meetings  January 11: Alice Tellez, PharmD, Pharmacy Resident at [facility], \"Medications and Bariatric Surgery\".  February 8: Open Forum  March 8: Sloan Albrecht MD, \"Exercise and Bariatric Surgery\".  April 12  May 10  Benita 14    Connections: Post-Operative Bariatric Surgery Support Group  This is a support group for [facility] bariatric patients (and those external to [facility]) who have had bariatric surgery and are at least 3 months post-surgery.  WHEN: This support group meets the 4th Wednesday of the month from 11:00 AM - 12:00 PM virtually using Microsoft Teams.   FACILITATOR: Led by Certified Bariatric Nurse, Kira Richardson RN.   TO REGISTER: Please send an email to Kira" "at [email or web address] if you would like an invitation to the group and to learn about using Microsoft Teams.    2022 Meetings  January 26  February 23  March 23  April 27  May 25  Benita 22    [facility] Healthy Lifestyle Virtual Support Group    Healthy Lifestyle Virtual Support Group?  This is 60 minutes of small group guided discussion, support and resources. All are welcome who want a healthy lifestyle.  WHEN: This group meets monthly on a Friday from 12:30 PM - 1:30 PM virtually using Microsoft Teams.   FACILITATOR: Led by National Board Certified Health and , Kira Hartley [facility].   TO REGISTER: Please send an email to Kira at?[email or web address] to receive monthly invites to the group or if you have any questions about having a health .  Prior to the meeting, a link with directions on how to join the meeting will be sent to you.    2022 Meetings  January 21: Mary Fish MS, RN, CIC, CBN, \"Healthy Habits\"  February 25: Open Forum  March 18: \"Setting Limits and Boundaries\"  April 29: Yas Dudley RD, \"Meal Planning Made Easy\"  May 20: Open Forum  June: To be determined  ____________________________________________________________________________________________________________________________________________________________________________  [place] [facility] Get Moving Program  Our team of physical therapists is trained to help you understand and take control of your condition. They will perform a thorough evaluation to determine your ability for activity and develop a customized plan to fit your goals and physical ability.  Scheduling: Unsure if the Get Moving program is right for you? Discuss the program with your medical provider or diabetes educator. You can also call us at [phone] to ask questions or schedule an appointment.   [facility] Get Moving " Program  ____________________________________________________________________________________________________________________________________________________________________________  [facility] Diabetes Prevention Program (DPP)  If you have prediabetes and Medicare please contact us via [employer] to learn more about the Diabetes Prevention Program (DPP)  Program Details:  [facility] offers the year-long Diabetes Prevention Program (DPP). The program helps you to make lifestyle changes that prevent or delay type 2 diabetes by supporting healthy eating, increased physical activity, stress reduction and use of coping skills.   On average, previous [facility] DPP cohorts have lost and maintained at least 5% of their starting weight throughout the program and averaged more than 150 minutes of physical activity per week.  Participants meet weekly for one-hour group sessions over sixteen weeks, every other week for the next 8 weeks, and monthly for the last six months.   A year-long maintenance program is also available for participants who complete the first year.   Location & Cost:   During the COVID-19 Public Health Emergency, the program is offered virtually. When in-person classes can resume, they will be held at [facility].  For people with Medicare, the program is covered in full. A self-pay option will also be available for those with non-Medicare insurance plans.   _________________________________________________________________________________________________________________________________________________________  Bluetooth Scale:    We hope to provide you with high quality virtual healthcare visits while social distancing for COVID-19 is necessary, as well as in the future when virtual visits may be more convenient for you.     Our technology team made it possible for Bluetooth scales to send weight measurements to our electronic medical record. This allows  weights from you weighing at home to securely flow into the medical record, which will improve telephone and virtual visits.   Additionally, studies have shown that adults actually lose more weight when their weights are automatically sent to someone else, and also that this process is not stressful for those adults.    Below is a link for purchasing the scale, with a discount code for our patients. You may call your insurance company to see if they will reimburse you for the cost of the scale, as a piece of durable medical equipment. The scales only go up to a weight of 400 pounds. This is an issue and we are working with the developer on increasing this. We found no scales that go over 400lb that have blue-tooth for connecting to [employer].    Scale to purchase: the Drip In  Body  Scale: [email or web address]=aw.ds    Discount Code: We have a discount code for our patients to bring the cost down to $50, Discount code is: [place]_Scale_20%off  _______________________________________________________________________________________________________________________________________________________________________________    To work with a Behavioral Health Psychologist:    Call to schedule:    Jasmeet Helton - [phone]  Obed Basurto - [phone]  Marina Dias - [phone]  Elizabeth Hobbs - [phone]   Doris Wilburn PhD (cannot accept Medicare) [phone]        Thank you,   [facility] Comprehensive Weight Management Team

## 2022-04-26 NOTE — LETTER
4/26/2022       RE: Babita Rivas  [address]  [place] [address]     Dear Colleague,    Thank you for referring your patient, Babita Rivas, to the [facility] [place] at [facility]. Please see a copy of my visit note below.    Babita Rivas is a 37 year old year old who is being evaluated via a billable telephone visit.      What phone number would you like to be contacted at? [phone]  How would you like to obtain your AVS? Candy Sanders NREMT    Call time 14min     Postoperative bariatric surgery visit.    Patient underwent sleeve gastrectomy 5 weeks ago. 3/22/22 Dr. Fitzpatrick      Tolerating liquids: yes, 2 shakes daily, 2 Gatorade zeros, 2 glasses of water    Lightheadedness: none  Abdominal pain: resolved   Bowel movements: normal, no issue   Fevers/shakes/chills: none  GERD: yesterday mild otherwise none Taking omeprazole once daily   Leg/calf pain: none    Mood okay  Some fatigue   Some struggles to find foods she likes     How many opioid pain medications used after surgery?  What did you do with extra pills?  Were any opioid pain medication refills provided after surgery?  Were any opioid pain medications needed after 30 days postop?    Wt 117 kg (258 lb)   LMP 02/28/2022   BMI 45.70 kg/m    NAD  Incisions c/d/i; non-tender per report     Plan:  1. RD visit today.  2. Start vitamin supplements per RD directions.  3. Advance diet per RD directions.  4. Follow-up: 2 months with labs   5. Actigall prescription not indicated  6. B12 injection   7. Pathology reviewed + h.pylori   8. Weight loss medications: stopped metformin, liraglutide, topiramate preop   9. Need to restart statin?       Arlette Corbett, CNP  [facility] [place]

## 2022-04-26 NOTE — NURSING NOTE
Chief Complaint   Patient presents with     Surgical Followup       Vitals:    04/26/22 0902   Weight: 258 lb       Body mass index is 45.7 kg/m .      Dionna Sanders, EMT  Surgery Clinic

## 2022-04-26 NOTE — PROGRESS NOTES
Babita Rivas is a 37 year old year old who is being evaluated via a billable telephone visit.      What phone number would you like to be contacted at? [phone]  How would you like to obtain your AVS? Candy Sanders NREMT    Call time 14min     Postoperative bariatric surgery visit.    Patient underwent sleeve gastrectomy 5 weeks ago. 3/22/22 Dr. Fitzpatrick      Tolerating liquids: yes, 2 shakes daily, 2 Gatorade zeros, 2 glasses of water    Lightheadedness: none  Abdominal pain: resolved   Bowel movements: normal, no issue   Fevers/shakes/chills: none  GERD: yesterday mild otherwise none Taking omeprazole once daily   Leg/calf pain: none    Mood okay  Some fatigue   Some struggles to find foods she likes     How many opioid pain medications used after surgery?  What did you do with extra pills?  Were any opioid pain medication refills provided after surgery?  Were any opioid pain medications needed after 30 days postop?    Wt 117 kg (258 lb)   LMP 02/28/2022   BMI 45.70 kg/m    NAD  Incisions c/d/i; non-tender per report     Plan:  1. RD visit today.  2. Start vitamin supplements per RD directions.  3. Advance diet per RD directions.  4. Follow-up: 2 months with labs   5. Actigall prescription not indicated  6. B12 injection   7. Pathology reviewed + h.pylori   8. Weight loss medications: stopped metformin, liraglutide, topiramate preop   9. Need to restart statin?       Arlette Corbett, CNP  [facility] [place]

## 2022-04-26 NOTE — ASSESSMENT & PLAN NOTE
1 month postop. Doing well overall. Adequate hydration. Continues 2 protein shakes daily. Rare reflux, takes omeprazole daily. +H.pylori, will start quadruple therapy today to treat. Can taper off omeprazole after treatment. No constipation.     Plan:  2 flinstone vitamins  b12 injection monthly   Calcium citrate daily - separate from multivitamin   Continue omeprazole through treatment for H.Pylori- then okay to taper off (every other day x 2 weeks, every 2 days x 2 weeks then stop)

## 2022-06-27 ENCOUNTER — TELEPHONE (OUTPATIENT)
Dept: ENDOCRINOLOGY | Facility: CLINIC | Age: 37
End: 2022-06-27

## 2022-07-17 ENCOUNTER — HEALTH MAINTENANCE LETTER (OUTPATIENT)
Age: 37
End: 2022-07-17

## 2022-07-20 ENCOUNTER — VIRTUAL VISIT (OUTPATIENT)
Dept: ENDOCRINOLOGY | Facility: CLINIC | Age: 37
End: 2022-07-20
Payer: COMMERCIAL

## 2022-07-20 VITALS — WEIGHT: 248 LBS | BODY MASS INDEX: 43.93 KG/M2

## 2022-07-20 DIAGNOSIS — Z71.3 NUTRITIONAL COUNSELING: Primary | ICD-10-CM

## 2022-07-20 DIAGNOSIS — Z98.84 S/P LAPAROSCOPIC SLEEVE GASTRECTOMY: ICD-10-CM

## 2022-07-20 DIAGNOSIS — E66.01 MORBID OBESITY (H): ICD-10-CM

## 2022-07-20 DIAGNOSIS — E11.9 TYPE 2 DIABETES MELLITUS WITHOUT COMPLICATION, WITHOUT LONG-TERM CURRENT USE OF INSULIN (H): ICD-10-CM

## 2022-07-20 PROCEDURE — 97803 MED NUTRITION INDIV SUBSEQ: CPT | Mod: 95 | Performed by: DIETITIAN, REGISTERED

## 2022-07-20 NOTE — LETTER
"7/20/2022       RE: Babita Rivas  [address]  [facility] [address]     Dear Colleague,    Thank you for referring your patient, Babita Rivas, to the [facility] WEIGHT MANAGEMENT CLINIC [place] at [facility]. Please see a copy of my visit note below.    Babita Rivas is a 37 year old female who is being evaluated via a billable telephone visit.     The patient has been notified of following:     \"This telephone visit will be conducted via a call between you and your physician/provider. We have found that certain health care needs can be provided without the need for a physical exam.  This service lets us provide the care you need with a short phone conversation.  If a prescription is necessary we can send it directly to your pharmacy.  If lab work is needed we can place an order for that and you can then stop by our lab to have the test done at a later time.    Telephone visits are billed at different rates depending on your insurance coverage. During this emergency period, for some insurers they may be billed the same as an in-person visit.  Please reach out to your insurance provider with any questions.    If during the course of the call the physician/provider feels a telephone visit is not appropriate, you will not be charged for this service.\"    Patient has given verbal consent for Telephone visit?  Yes    How would you like to obtain your AVS? Candy    Phone call duration: 25 minutes    During this virtual visit the patient is located in [place], patient verifies this as the location during the entirety of this visit.       Nutrition Reassessment  Reason For Visit:  Babita Rivas is a 37 year old female presenting today for nutrition follow-up, 4 month s/p aparoscopic sleeve gastrectomy with Dr Fitzpatrick 3/22/22.     Pt referred by Arlette Corbett NP on February 15, 2021 and again by Dr. Fitzpatrick following surgery " "3/22/22.     Anthropometrics:  Consult weight: 290 lbs with a BMI 52.26 kg/m2   Day of Surgery Weight: 281 lbs    Estimated body mass index is 43.93 kg/m  as calculated from the following:    Height as of 3/29/22: 1.6 m (5' 3\").    Weight as of this encounter: 112.5 kg (248 lb).    Current Weight: 248.4 lbs, pt report  - Noticing lots of weight loss in hips/butt   - Better mobility/endurance     Weight loss: -42 lbs (14.48%) from initial consult; -33 lbs from day of surgery    Current Vitamins/Minerals:   [place] with iron - 2 per day  B12 injection monthly     Still needs calcium citrate      Hx of Vit D def    Nutrition History:  No N/V or dysphagia.    Avoiding pasta/bread. Does eat some rice but just a few bites.   Avoiding sugar  If going out to eat focuses on protein.   Eating lots of meat and eggs    Meeting protein needs.     Staying well hydrated - premier protein, water, sugar-free Gatorade.     Typical day  - eggs  - 2-3 protein shakes  - chicken breast, a little mashed potatoes or rice  - snacks: none or trail mix, was doing sugar-free popsicles     Not feeling real hungry. Recommended working on eating 3 meals/day and 1 protein shake as opposed to 2-3 protein shakes.     Nutrition Prescription:  Grams Protein: 60 (minimum)   Amount of Fluid: 48-64+ oz    Nutrition Diagnosis  Previous: Food and nutrition-related knowledge deficit r/t lack of prior exposure to diet advancements beyond bariatric pureed diet aeb recent bariatric surgery and pt interest in diet education/review    Current: Food and nutrition-related knowledge deficit r/t lack of prior exposure to diet instruction beyond 3 months s/p SG as evidenced by recent bariatric surgery and pt interest in diet education/review      Intervention  Materials/Education provided, reviewed bariatric regular consistency diet, protein intake, fluid intake, eating pace, chewing foods well, portion control, sugar/fat intake, recommended vitamin/mineral " supplements. Patient demonstrates understanding.     Expected Engagement: good    Goals:  1) Follow bariatric regular diet.   2) Consume 60+ grams of protein/day.   - Recommend trying to eat 3 more solid meals/day and having 1 protein shake as opposed to 2-3.   3) Sip on 48-64+ oz of fluids/day- between meals only.  4) Eat slowly (>20 min/meal), chewing foods well (to applesauce-like consistency).  5) Limit portions to ~1/2 - 3/4 cup/meal until 6 months post op. Listen to body - stop when satisfied.   6) Take vitamins/minerals as recommended  7) Schedule follow up appt with Arlette or Thuy     Your Stage 5 Diet: Regular Foods  [email or web address]     Keeping Up Your Diet after Weight Loss Surgery  [email or web address]    Exercises after Weight Loss Surgery (strengthening, when no weight lifting restrictions)  [email or web address]    Why Take Supplements for Life after Weight Loss Surgery  [email or web address]      Preventing Low Blood Sugar after Weight Loss Surgery  [email or web address]      Preventing Dumping Syndrome after Weight Loss Surgery  [email or web address]    Follow-Up: 6 months post op/prn (due 9/22/22)    Time spent with patient: 36 minutes.  BUNNY Corral, RD LD

## 2022-07-20 NOTE — PROGRESS NOTES
"Babita Rivas is a 37 year old female who is being evaluated via a billable telephone visit.     The patient has been notified of following:     \"This telephone visit will be conducted via a call between you and your physician/provider. We have found that certain health care needs can be provided without the need for a physical exam.  This service lets us provide the care you need with a short phone conversation.  If a prescription is necessary we can send it directly to your pharmacy.  If lab work is needed we can place an order for that and you can then stop by our lab to have the test done at a later time.    Telephone visits are billed at different rates depending on your insurance coverage. During this emergency period, for some insurers they may be billed the same as an in-person visit.  Please reach out to your insurance provider with any questions.    If during the course of the call the physician/provider feels a telephone visit is not appropriate, you will not be charged for this service.\"    Patient has given verbal consent for Telephone visit?  Yes    How would you like to obtain your AVS? MyChart    Phone call duration: 25 minutes    During this virtual visit the patient is located in [place], patient verifies this as the location during the entirety of this visit.       Nutrition Reassessment  Reason For Visit:  Babita Rivas is a 37 year old female presenting today for nutrition follow-up, 4 month s/p aparoscopic sleeve gastrectomy with Dr Fitzpatrick 3/22/22.     Pt referred by Arlette Corbett NP on February 15, 2021 and again by Dr. Fitzpatrick following surgery 3/22/22.     Anthropometrics:  Consult weight: 290 lbs with a BMI 52.26 kg/m2   Day of Surgery Weight: 281 lbs    Estimated body mass index is 43.93 kg/m  as calculated from the following:    Height as of 3/29/22: 1.6 m (5' 3\").    Weight as of this encounter: 112.5 kg (248 lb).    Current Weight: 248.4 lbs, pt report  - Noticing lots of weight " loss in hips/butt   - Better mobility/endurance     Weight loss: -42 lbs (14.48%) from initial consult; -33 lbs from day of surgery    Current Vitamins/Minerals:   [place] with iron - 2 per day  B12 injection monthly     Still needs calcium citrate      Hx of Vit D def    Nutrition History:  No N/V or dysphagia.    Avoiding pasta/bread. Does eat some rice but just a few bites.   Avoiding sugar  If going out to eat focuses on protein.   Eating lots of meat and eggs    Meeting protein needs.     Staying well hydrated - premier protein, water, sugar-free Gatorade.     Typical day  - eggs  - 2-3 protein shakes  - chicken breast, a little mashed potatoes or rice  - snacks: none or trail mix, was doing sugar-free popsicles     Not feeling real hungry. Recommended working on eating 3 meals/day and 1 protein shake as opposed to 2-3 protein shakes.     Nutrition Prescription:  Grams Protein: 60 (minimum)   Amount of Fluid: 48-64+ oz    Nutrition Diagnosis  Previous: Food and nutrition-related knowledge deficit r/t lack of prior exposure to diet advancements beyond bariatric pureed diet aeb recent bariatric surgery and pt interest in diet education/review    Current: Food and nutrition-related knowledge deficit r/t lack of prior exposure to diet instruction beyond 3 months s/p SG as evidenced by recent bariatric surgery and pt interest in diet education/review      Intervention  Materials/Education provided, reviewed bariatric regular consistency diet, protein intake, fluid intake, eating pace, chewing foods well, portion control, sugar/fat intake, recommended vitamin/mineral supplements. Patient demonstrates understanding.     Expected Engagement: good    Goals:  1) Follow bariatric regular diet.   2) Consume 60+ grams of protein/day.   - Recommend trying to eat 3 more solid meals/day and having 1 protein shake as opposed to 2-3.   3) Sip on 48-64+ oz of fluids/day- between meals only.  4) Eat slowly (>20 min/meal),  chewing foods well (to applesauce-like consistency).  5) Limit portions to ~1/2 - 3/4 cup/meal until 6 months post op. Listen to body - stop when satisfied.   6) Take vitamins/minerals as recommended  7) Schedule follow up appt with Arlette or Thuy     Your Stage 5 Diet: Regular Foods  [email or web address]     Keeping Up Your Diet after Weight Loss Surgery  [email or web address]    Exercises after Weight Loss Surgery (strengthening, when no weight lifting restrictions)  [email or web address]    Why Take Supplements for Life after Weight Loss Surgery  [email or web address]      Preventing Low Blood Sugar after Weight Loss Surgery  [email or web address]      Preventing Dumping Syndrome after Weight Loss Surgery  [email or web address]    Follow-Up: 6 months post op/prn (due 9/22/22)    Time spent with patient: 36 minutes.  BUNNY Corral, RD LD

## 2022-07-21 ENCOUNTER — TELEPHONE (OUTPATIENT)
Dept: ENDOCRINOLOGY | Facility: CLINIC | Age: 37
End: 2022-07-21

## 2022-07-21 NOTE — PATIENT INSTRUCTIONS
Goals:  1) Follow bariatric regular diet.   2) Consume 60+ grams of protein/day.   - Recommend trying to eat 3 more solid meals/day and having 1 protein shake as opposed to 2-3.   3) Sip on 48-64+ oz of fluids/day- between meals only.  4) Eat slowly (>20 min/meal), chewing foods well (to applesauce-like consistency).  5) Limit portions to ~1/2 - 3/4 cup/meal until 6 months post op. Listen to body - stop when satisfied.   6) Take vitamins/minerals as recommended  7) Schedule follow up appt with Arlette or Thuy     Your Stage 5 Diet: Regular Foods  [email or web address]     Keeping Up Your Diet after Weight Loss Surgery  [email or web address]    Exercises after Weight Loss Surgery (strengthening, when no weight lifting restrictions)  [email or web address]    Why Take Supplements for Life after Weight Loss Surgery  [email or web address]      Preventing Low Blood Sugar after Weight Loss Surgery  [email or web address]      Preventing Dumping Syndrome after Weight Loss Surgery  [email or web address]    Follow-Up: 6 months post op/prn (due 9/22/22)    BUNNY Rivera, RD, LD  Clinic #: [phone]

## 2022-09-25 ENCOUNTER — HEALTH MAINTENANCE LETTER (OUTPATIENT)
Age: 37
End: 2022-09-25

## 2022-12-23 ENCOUNTER — VIRTUAL VISIT (OUTPATIENT)
Dept: ENDOCRINOLOGY | Facility: CLINIC | Age: 37
End: 2022-12-23
Payer: COMMERCIAL

## 2022-12-23 ENCOUNTER — TELEPHONE (OUTPATIENT)
Dept: ENDOCRINOLOGY | Facility: CLINIC | Age: 37
End: 2022-12-23

## 2022-12-23 VITALS — WEIGHT: 250 LBS | BODY MASS INDEX: 44.3 KG/M2 | HEIGHT: 63 IN

## 2022-12-23 DIAGNOSIS — E66.813 CLASS 3 SEVERE OBESITY DUE TO EXCESS CALORIES WITH SERIOUS COMORBIDITY AND BODY MASS INDEX (BMI) OF 50.0 TO 59.9 IN ADULT (H): Primary | ICD-10-CM

## 2022-12-23 DIAGNOSIS — A04.8 H. PYLORI INFECTION: ICD-10-CM

## 2022-12-23 DIAGNOSIS — E66.01 CLASS 3 SEVERE OBESITY DUE TO EXCESS CALORIES WITH SERIOUS COMORBIDITY AND BODY MASS INDEX (BMI) OF 50.0 TO 59.9 IN ADULT (H): Primary | ICD-10-CM

## 2022-12-23 PROCEDURE — 99215 OFFICE O/P EST HI 40 MIN: CPT | Mod: GT

## 2022-12-23 ASSESSMENT — PAIN SCALES - GENERAL: PAINLEVEL: NO PAIN (0)

## 2022-12-23 NOTE — LETTER
2022       RE: Babita Rivas  [address]  [facility] [address]     Dear Colleague,    Thank you for referring your patient, Babita Rivas, to the [facility] WEIGHT MANAGEMENT CLINIC [place] at [facility]. Please see a copy of my visit note below.    Babita Rivas is a 37 year old who is being evaluated via a billable video visit.      How would you like to obtain your AVS? MyChart  If the video visit is dropped, the invitation should be resent by: Text to cell phone: [phone]  Will anyone else be joining your video visit? No    During this virtual visit the patient is located in [place], patient verifies this as the location during the entirety of this visit.      Video-Visit Details    Video Start Time: 10:18AM    Type of service:  Video Visit    Video End Time:10:52AM    Originating Location (pt. Location): Home        Distant Location (provider location):  Off-site    Platform used for Video Visit: [facility] Bariatric Surgery Note    RE: Babita Rivas  MR#: [id number]  : 1985  VISIT DATE: Dec 23, 2022      Dear Elizabeth Pascual,    I had the pleasure of seeing your patient, Babita Rivas, in my post-bariatric surgery assessment clinic.    Assessment & Plan   Problem List Items Addressed This Visit        Digestive    Class 3 severe obesity due to excess calories with serious comorbidity and body mass index (BMI) of 50.0 to 59.9 in adult (H) - Primary     S/p gastric sleeve in 3/2022 with Dr. Fitzpatrick. Has not been seen since 1 month post op. Has had home life stressors that caused lost to follow. Weight loss stalled in . She would like to get back to basics of post op diet. Currently struggles with increase hunger and portion sizes.     Discussed AOMs today. Was previously on Victoza prior to surgery. Was well tolerated with no side effects. Had little effect on hunger. Will start Saxenda if  covered. Reviewed side effects.          Relevant Medications    liraglutide - Weight Management (SAXENDA) 18 MG/3ML pen    insulin pen needle (31G X 5 MM) 31G X 5 MM miscellaneous   Other Visit Diagnoses     H. pylori infection        Relevant Orders    Helicobacter pylori Antigen Stool         1. Labs ordered in May, to be collected  2. H. Pylori antigen stool ordered. Patient completed H. Pylori treatment   3. Start Vitamins:   - Multivitamin with iron 2 tablets once daily  - Calcium Citrate 600mg twice daily   4. Start Saxenda ramp up to 3.0mg. If not covered can consider victoza or ozempic.   5. See dietitian at next available   6. Follow up with Ana Lilia Falcon in 5 weeks.       60 minutes spent on the date of the encounter doing chart review, history and exam, documentation and further activities per the note    CHIEF COMPLAINT: Post-bariatric surgery follow-up. 9 months s/p gastric sleeve with Dr. Fitzpatrick.    HISTORY OF PRESENT ILLNESS:  No flowsheet data found.    Has been a tough 6 months. Financially been harder. Going through court cases.     Weight loss has stalled since June.        Recent diet changes:  Feels like she can eat a little more then before. No longer feeling as full with increased hunger. Eating 1 meal a day with snacks. Tolerating food well. Denies odonaphagia, dysphagia, nausea, vomiting  Breakfast - chicken + rice + sauce   Dinner - rice + protein,   Snacks - chips     -Protein? Tries to get protein in every meal. No longer can afford protein shakes   -Water? Needs to increase    Recent exercise/activity changes: very active around house     Recent stressors: Stress in home life    Recent sleep changes: Stable     Vitamins/Labs: Hasn't been able to take them. Only been taking B12.     Finished H Pylori treatmentt.   Denies GERD.     Weight History:  No flowsheet data found.  Initial Weight (lbs): 290 lbs  Weight: 113.4 kg (250 lb) (Patient reported)  Last Visits Weight: 117 kg (258  "lb)  Cumulative weight loss (lbs): 40  Weight Loss Percentage: 13.79%     Wt Readings from Last 5 Encounters:   12/23/22 113.4 kg (250 lb)   07/20/22 112.5 kg (248 lb)   04/26/22 117 kg (258 lb)   03/29/22 123.3 kg (271 lb 14.4 oz)   03/29/22 123.8 kg (273 lb)       No flowsheet data found.    Eating Habits 2/9/2021   How many meals do you eat per day? 2   Do you snack between meals? Sometimes       Exercise Questions Reviewed With Patient 2/9/2021   How often do you exercise? 1 to 2 times per week   What is the duration of your exercise (in minutes)? 15 Minutes   What types of exercise do you do? walking   What keeps you from being more active?  Pain       Social History:  No flowsheet data found.    Medications:  Current Outpatient Medications   Medication     acetaminophen (TYLENOL) 325 MG tablet     cyanocobalamin (CYANOCOBALAMIN) 1000 MCG/ML injection     insulin pen needle (31G X 5 MM) 31G X 5 MM miscellaneous     liraglutide - Weight Management (SAXENDA) 18 MG/3ML pen     calcium Citrate-vitamin D 500-400 MG-UNIT CHEW     hydrOXYzine (ATARAX) 10 MG tablet     sertraline (ZOLOFT) 25 MG tablet     No current facility-administered medications for this visit.     No flowsheet data found.    ROS:  GI: No flowsheet data found.  Skin: No flowsheet data found.  Psych: No flowsheet data found.  Female Only:   BAR RBS ROS - FEMALE ONLY 2/9/2021   Female only: None of the above         Age less than 45, no DEXA indicated.      Objective     Ht 1.6 m (5' 3\")   Wt 113.4 kg (250 lb)   BMI 44.29 kg/m    Vitals - Patient Reported  Pain Score: No Pain (0)        Physical Exam   GENERAL: Healthy, alert and no distress  EYES: Eyes grossly normal to inspection.  No discharge or erythema, or obvious scleral/conjunctival abnormalities.  RESP: No audible wheeze, cough, or visible cyanosis.  No visible retractions or increased work of breathing.    SKIN: Visible skin clear. No significant rash, abnormal pigmentation or " lesions.  NEURO: Cranial nerves grossly intact.  Mentation and speech appropriate for age.  PSYCH: Mentation appears normal, affect normal/bright, judgement and insight intact, normal speech and appearance well-groomed.        Sincerely,    ADRIANA WOODS PA-C

## 2022-12-23 NOTE — TELEPHONE ENCOUNTER
Confirmed via test claim, pa needed on [facility] pharmacy insurance.  BIN:[id number] PCN:MA Group:[id number] ID:[id number]    Please initiate PA.

## 2022-12-23 NOTE — PROGRESS NOTES
Babita Rivas is a 37 year old who is being evaluated via a billable video visit.      How would you like to obtain your AVS? MyChart  If the video visit is dropped, the invitation should be resent by: Text to cell phone: [phone]  Will anyone else be joining your video visit? No    During this virtual visit the patient is located in [place], patient verifies this as the location during the entirety of this visit.      Video-Visit Details    Video Start Time: 10:18AM    Type of service:  Video Visit    Video End Time:10:52AM    Originating Location (pt. Location): Home        Distant Location (provider location):  Off-site    Platform used for Video Visit: [facility] Bariatric Surgery Note    RE: Babita Rivas  MR#: [id number]  : 1985  VISIT DATE: Dec 23, 2022      Dear Elizabeth Pascual,    I had the pleasure of seeing your patient, Babita Rivas, in my post-bariatric surgery assessment clinic.    Assessment & Plan   Problem List Items Addressed This Visit        Digestive    Class 3 severe obesity due to excess calories with serious comorbidity and body mass index (BMI) of 50.0 to 59.9 in adult (H) - Primary     S/p gastric sleeve in 3/2022 with Dr. Fitzpatrick. Has not been seen since 1 month post op. Has had home life stressors that caused lost to follow. Weight loss stalled in . She would like to get back to basics of post op diet. Currently struggles with increase hunger and portion sizes.     Discussed AOMs today. Was previously on Victoza prior to surgery. Was well tolerated with no side effects. Had little effect on hunger. Will start Saxenda if covered. Reviewed side effects.          Relevant Medications    liraglutide - Weight Management (SAXENDA) 18 MG/3ML pen    insulin pen needle (31G X 5 MM) 31G X 5 MM miscellaneous   Other Visit Diagnoses     H. pylori infection        Relevant Orders    Helicobacter pylori Antigen Stool         1. Labs ordered in May, to be  collected  2. H. Pylori antigen stool ordered. Patient completed H. Pylori treatment   3. Start Vitamins:   - Multivitamin with iron 2 tablets once daily  - Calcium Citrate 600mg twice daily   4. Start Saxenda ramp up to 3.0mg. If not covered can consider victoza or ozempic.   5. See dietitian at next available   6. Follow up with Ana Lilia Falcon in 5 weeks.       60 minutes spent on the date of the encounter doing chart review, history and exam, documentation and further activities per the note    CHIEF COMPLAINT: Post-bariatric surgery follow-up. 9 months s/p gastric sleeve with Dr. Fitzpatrick.    HISTORY OF PRESENT ILLNESS:  No flowsheet data found.    Has been a tough 6 months. Financially been harder. Going through court cases.     Weight loss has stalled since June.        Recent diet changes:  Feels like she can eat a little more then before. No longer feeling as full with increased hunger. Eating 1 meal a day with snacks. Tolerating food well. Denies odonaphagia, dysphagia, nausea, vomiting  Breakfast - chicken + rice + sauce   Dinner - rice + protein,   Snacks - chips     -Protein? Tries to get protein in every meal. No longer can afford protein shakes   -Water? Needs to increase    Recent exercise/activity changes: very active around house     Recent stressors: Stress in home life    Recent sleep changes: Stable     Vitamins/Labs: Hasn't been able to take them. Only been taking B12.     Finished H Pylori treatmentt.   Denies GERD.     Weight History:  No flowsheet data found.  Initial Weight (lbs): 290 lbs  Weight: 113.4 kg (250 lb) (Patient reported)  Last Visits Weight: 117 kg (258 lb)  Cumulative weight loss (lbs): 40  Weight Loss Percentage: 13.79%     Wt Readings from Last 5 Encounters:   12/23/22 113.4 kg (250 lb)   07/20/22 112.5 kg (248 lb)   04/26/22 117 kg (258 lb)   03/29/22 123.3 kg (271 lb 14.4 oz)   03/29/22 123.8 kg (273 lb)       No flowsheet data found.    Eating Habits 2/9/2021   How many meals do  "you eat per day? 2   Do you snack between meals? Sometimes       Exercise Questions Reviewed With Patient 2/9/2021   How often do you exercise? 1 to 2 times per week   What is the duration of your exercise (in minutes)? 15 Minutes   What types of exercise do you do? walking   What keeps you from being more active?  Pain       Social History:  No flowsheet data found.    Medications:  Current Outpatient Medications   Medication     acetaminophen (TYLENOL) 325 MG tablet     cyanocobalamin (CYANOCOBALAMIN) 1000 MCG/ML injection     insulin pen needle (31G X 5 MM) 31G X 5 MM miscellaneous     liraglutide - Weight Management (SAXENDA) 18 MG/3ML pen     calcium Citrate-vitamin D 500-400 MG-UNIT CHEW     hydrOXYzine (ATARAX) 10 MG tablet     sertraline (ZOLOFT) 25 MG tablet     No current facility-administered medications for this visit.     No flowsheet data found.    ROS:  GI: No flowsheet data found.  Skin: No flowsheet data found.  Psych: No flowsheet data found.  Female Only:   BAR RBS ROS - FEMALE ONLY 2/9/2021   Female only: None of the above         Age less than 45, no DEXA indicated.      Objective    Ht 1.6 m (5' 3\")   Wt 113.4 kg (250 lb)   BMI 44.29 kg/m    Vitals - Patient Reported  Pain Score: No Pain (0)        Physical Exam   GENERAL: Healthy, alert and no distress  EYES: Eyes grossly normal to inspection.  No discharge or erythema, or obvious scleral/conjunctival abnormalities.  RESP: No audible wheeze, cough, or visible cyanosis.  No visible retractions or increased work of breathing.    SKIN: Visible skin clear. No significant rash, abnormal pigmentation or lesions.  NEURO: Cranial nerves grossly intact.  Mentation and speech appropriate for age.  PSYCH: Mentation appears normal, affect normal/bright, judgement and insight intact, normal speech and appearance well-groomed.        Sincerely,    ADRIANA WOODS PA-C    "

## 2022-12-23 NOTE — ASSESSMENT & PLAN NOTE
Right middle finger wound  irrigated w/ 200ml  0.9NS w/5 squirts of skin integrity- tolerated well  AT   S/p gastric sleeve in 3/2022 with Dr. Fitzpatrick. Has not been seen since 1 month post op. Has had home life stressors that caused lost to follow. Weight loss stalled in June. She would like to get back to basics of post op diet. Currently struggles with increase hunger and portion sizes.     Discussed AOMs today. Was previously on Victoza prior to surgery. Was well tolerated with no side effects. Had little effect on hunger. Will start Saxenda if covered. Reviewed side effects.

## 2022-12-23 NOTE — TELEPHONE ENCOUNTER
Prior Authorization Retail Medication Request    Medication/Dose: Saxenda  ICD code (if different than what is on RX):  Class 3 severe obesity due to excess calories with serious comorbidity and body mass index (BMI) of 50.0 to 59.9 in adult (H) [E66.01, Z68.43]  - Primary     Previously Tried and Failed:  Weight loss surgery, history of diet and exercise  Rationale:   9 months s/p gastric sleeve .     Recent diet changes:  Feels like she can eat a little more then before. No longer feeling as full. Feels like hunger is well controlled. Eating 1 meal a day with snack. Tolerating food well. Denies odonaphagia, dysphagia, nausea, vomiting  Breakfast - chicken + rice + sauce   Dinner - rice + protein,   Snacks - chips      -Protein? Tries to get protein in every meal. No longer can afford protein shakes   -Water? Needs to increase     Recent exercise/activity changes: very active around house     Initial Weight (lbs): 290 lbs  Weight: 113.4 kg (250 lb) (Patient reported)  Last Visits Weight: 117 kg (258 lb)  Cumulative weight loss (lbs): 40  Weight Loss Percentage: 13.79%     Insurance Name:    Insurance ID:        Pharmacy Information (if different than what is on RX)  Name:  [id number] - [place], [place] - [address] AT [address]  Phone:  [phone]

## 2022-12-23 NOTE — NURSING NOTE
"(   Chief Complaint   Patient presents with     RECHECK     Return bariatrics    )    ( Weight: 113.4 kg (250 lb) (Patient reported) )  ( Height: 160 cm (5' 3\") )  ( BMI (Calculated): 44.29 )  (   )  (   )  (   )  (   )  (   )  (   )    (   )  (   )  (   )  (   )  (   )  (   )  (   )    (   Patient Active Problem List   Diagnosis     Diet controlled gestational diabetes mellitus (GDM) in third trimester     Dysthymic disorder     Exercise-induced asthma     Irregular menses     Class 3 severe obesity due to excess calories with serious comorbidity and body mass index (BMI) of 50.0 to 59.9 in adult (H)     Posttraumatic stress disorder     S/P  section     Severe recurrent major depression without psychotic features (H)     Diabetes mellitus, type 2 (H)     S/P laparoscopic sleeve gastrectomy    )  (   Current Outpatient Medications   Medication Sig Dispense Refill     acetaminophen (TYLENOL) 325 MG tablet Take 2 tablets (650 mg) by mouth every 4 hours as needed for other (For optimal non-opioid multimodal pain management to improve pain control and physical function.) 30 tablet 0     calcium Citrate-vitamin D 500-400 MG-UNIT CHEW Take 1 chew tab by mouth 3 times daily 90 tablet 11     cyanocobalamin (CYANOCOBALAMIN) 1000 MCG/ML injection Inject 1 mL (1,000 mcg) Subcutaneous every 30 days 1 mL 11     hydrOXYzine (ATARAX) 10 MG tablet Take 10 mg by mouth        insulin pen needle (32G X 6 MM) 32G X 6 MM miscellaneous Use 1 pen needles daily or as directed with Victoza. 100 each 1     omeprazole (PRILOSEC) 20 MG DR capsule Take 1 capsule (20 mg) by mouth daily 30 capsule 3     sertraline (ZOLOFT) 25 MG tablet Take 25 mg by mouth daily        Syringe/Needle, Disp, (BD ECLIPSE SYRINGE) 25G X 5/8\" 3 ML MISC 1 Syringe every 30 days 1 each 11     Syringe/Needle, Disp, (BD ECLIPSE SYRINGE) 25G X 5/8\" 3 ML MISC 1 Syringe every 30 days 1 each 11     hyoscyamine (LEVSIN) 0.125 MG tablet Take 1 tablet (125 mcg) by mouth " every 4 hours as needed for cramping (Patient not taking: Reported on 12/23/2022) 30 tablet 1     ondansetron (ZOFRAN-ODT) 4 MG ODT tab Take 1 tablet (4 mg) by mouth every 8 hours as needed for nausea (Patient not taking: Reported on 12/23/2022) 15 tablet 0    )  ( Diabetes Eval:    )    ( Pain Eval:  No Pain (0) )    ( Wound Eval:       )    (   History   Smoking Status     Never   Smokeless Tobacco     Never    )    ( Signed By:  Meseret Srivastava, EMT; December 23, 2022; 8:33 AM )

## 2022-12-23 NOTE — PATIENT INSTRUCTIONS
"Thank you for allowing us the privilege of caring for you. We hope we provided you with the excellent service you deserve.   Please let us know if there is anything else we can do for you so that we can be sure you are completely satisfied with your care experience.    To ensure the quality of our services you may be receiving a patient satisfaction survey from an independent patient satisfaction monitoring company.    The greatest compliment you can give is a \"Likely to Recommend\"    Your visit was with ADRIANA WOODS PA-C today.    Instructions per today's visit:     Aaron Rivas, it was great to visit with you today.  Here is a review of our visit.  If our clinic scheduler is not able to reach you please call [phone] to schedule your next appointments.    Lab have been ordered.  Please make an appointment to have them drawn at your convenience.   To schedule the Lab Appointment using [employer]:  Select \"Schedule an Appointment\"  Select \"Lab Only\"  For \"A couple of questions\", select \"Other\"  For \"Which locations work for you?, select the location and set up the appointment  To schedule by phone call [phone] to schedule a lab only appointment at any [facility] lab.  2. H. Pylori antigen stool ordered.   3. Start Vitamins:   - Multivitamin with iron 2 tablets once daily  - Calcium Citrate 600mg twice daily   4. Start Saxenda ramp up to 3.0mg. If not covered can consider victoza or ozempic.   5. See dietitian at next available   6. Follow up with Adriana Falcon in 5 weeks.    Let us know when you are coming into to clinic to get protein shakes!       Information about Video Visits with [facility]: video visit information  _________________________________________________________________________________________________________________________________________________________  If you are asked by your clinic team to have your blood pressure checked:  [facility] do offer several locations " for blood pressure checks. Please follow the below link to schedule an appointment. Scheduling an appointment at the pharmacy for a blood pressure check is now preferred.    Appointment Plus ([email or web address])  _________________________________________________________________________________________________________________________________________________________  Important contact and scheduling information:  Please call our contact center at [phone] to schedule your next appointments.  To find a lab location near you, please call [phone].  For any nursing questions or concerns call Carmina Cavazos LPN at [phone] or Sandrine Salazar RN at [phone]  Please call during clinic hours Monday through Friday 8:00a - 4:00p if you have questions or you can contact us via [employer] at anytime and we will reply during clinic hours.    Lab results will be communicated through My Chart or letter (if My Chart not used). Please call the clinic if you have not received communication after 1 week or if you have any questions.?  Clinic Fax: [phone]    _________________________________________________________________________________________________________________________________________________________  Meal Replacement Products:    Here is the link to our new e-store where you can purchase our meal replacement products    [facility] E-Store  [facility].[employer]/store    The one week starter kit is a great way to sample a variety of products and see what works for you.    If you want more information about the product go to: [employer]    If you are an employee or [facility] or [facility] please contact your care team for a 10% estore discount    Free Shipping for orders over $75     Benefits of meal replacements products:    Portion and calorie control  Improved nutrition  Structured eating  Simplified food choices  Avoid contact with trigger  foods  _________________________________________________________________________________________________________________________________________________________  Interested in working with a health ?  Health coaches work with you to improve your overall health and wellbeing.  They look at the whole person, and may involve discussion of different areas of life, including, but not limited to the four pillars of health (sleep, exercise, nutrition, and stress management). Discuss with your care team if you would like to start working a health .  Health Coaching-3 Pack: Schedule by calling [phone]    $99 for three health coaching visits    Visits may be done in person or via phone    Coaching is a partnership between the  and the client; Coaches do not prescribe or diagnose    Coaching helps inspire the client to reach his/her personal goals   _________________________________________________________________________________________________________________________________________________________  24 Week Healthy Lifestyle Plan:    Our mission in the 24-week Healthy Lifestyle Plan is to provide you with individualized care by giving you the tools, education and support you need to lose weight and maintain a healthy lifestyle. In your 24-week journey, you ll be supported by a dedicated weight loss team that includes registered dietitians, medical weight management providers, health coaches, and nurses -- all with special expertise in weight loss -- to help you every step of the way.     Monthly meetings with your registered dietician or medical weight management provider help to review your progress, update your care plan, and make any adjustments needed to ensure success. Between these visits, weekly and bi-weekly health  visits will help you focus on the four pillars of weight loss -- stress, sleep, nutrition, and exercise -- and how you can best adapt each to achieve sustainable weight loss  results.    In addition, you will be given exclusive access to online wellbeing classes through [employer].  Your initial visit will be with a medical weight management provider who will help to understand your weight loss goals and ensure this program is the right fit for you. Please let our team know if you are interested in the 24 week plan by sending a message to your care team or calling [phone] to schedule.  _________________________________________________________________________________________________________________________________________________________    COMPREHENSIVE WEIGHT MANAGEMENT PROGRAM  VIRTUAL SUPPORT GROUPS    For Support Group Information:      We offer support groups for patients who are working on weight loss and considering, preparing for or have had weight loss surgery.   There is no cost for this opportunity.  You are invited to attend the?Virtual Support Groups?provided by any of the following locations:    [facility] via [place] Teams with Shayy Hurley RN  2.   [place] via [place] Teams with Abel Elizabeth, PhD, LP  3.   [place] via [place] Teams with Kira Richardson RN  4.   [facility] via [place] Teams with Kira Hartley [facility]    The following Support Group information can also be found on our website:  [email or web address]    [facility] Weight Loss Surgery Support Group    [facility] Weight Loss Surgery Support Group  The support group is a patient-lead forum that meets monthly to share experiences, encouragement and education. It is open to those who have had weight loss surgery, are scheduled for surgery, and those who are considering surgery.   WHEN: This group meets on the 3rd Wednesday of each month from 5:00PM - 6:00PM virtually using Microsoft Teams.   FACILITATOR: Led by Shayy Hurley RD, LD, RN, the program's Clinical Coordinator.   TO REGISTER: Please contact the  "clinic via [employer] or call the nurse line directly at [phone] to inform our staff that you would like an invite sent to you and to let us know the email you would like the invite sent to. Prior to the meeting, a link with directions on how to join the meeting will be sent to you.    2022 Meetings  Benita 15: \"Let's Talk\" a time for the group to share.  July 20: \"Let's Talk\" a time for the group to share.  August 17: \"Let's Talk\" a time for the group to share.  September 21: \"Let's Talk\" a time for the group to share.  October 19: Guest Speaker: Dr Eb Begum MD Pulmonologist and Sleep Medicine Physician, \"Getting a Good Night's Sleep\".  November 16: \"Let's Talk\" a time for the group to share.  December 21: \"Let's Talk\" a time for the group to share.    [facility] Support Groups    Connections: Bariatric Care Support Group?  This is open to all [facility] (and those external to this program) pre- and post- operative bariatric surgery patients as well as their support system.   WHEN: This group meets the 2nd Tuesday of each month from 6:30 PM - 8:00 PM virtually using Microsoft Teams.   FACILITATOR: Led by Abel Elizabeth, Ph.D who is a Licensed Psychologist with the [facility] Comprehensive Weight Management Program.   TO REGISTER: Please send an email to Abel Elizabeth, Ph.D., LP at?[email or web address]?if you would like an invitation to the group and to learn about using Microsoft Teams.    2022 Meetings  June 14: Heydi Rucker, SCOTTY, LD at [facility], \"Nutritional Labeling\"  July 12 August 2 (Please Note Date Change)  September 13 October 11 November 8 December 13    Connections: Post-Operative Bariatric Surgery Support Group  This is a support group for [facility] bariatric patients (and those external to [facility]) who have had bariatric surgery and are at least 3 months post-surgery.  WHEN: This support group meets the " "4th Wednesday of the month from 11:00 AM - 12:00 PM virtually using Microsoft Teams.   FACILITATOR: Led by Certified Bariatric Nurse, Kira Richardson RN.   TO REGISTER: Please send an email to Kira at [email or web address] if you would like an invitation to the group and to learn about using Microsoft Teams.    2022 Meetings June 22 July 27 August 24 September 28 October 26 November 23 December 28      [facility] Healthy Lifestyle Virtual Support Group    Healthy Lifestyle Virtual Support Group?  This is 60 minutes of small group guided discussion, support and resources. All are welcome who want a healthy lifestyle.  WHEN: This group meets monthly on a Friday from 12:30 PM - 1:30 PM virtually using Microsoft Teams.   FACILITATOR: Led by National Board Certified Health and , Kira Hartley [facility].   TO REGISTER: Please send an email to Kira at?[email or web address] to receive monthly invites to the group or if you have any questions about having a health .  Prior to the meeting, a link with directions on how to join the meeting will be sent to you.    2022 Meetings  June 24: Kira Hartley [facility], \"Setting Limits and Boundaries\".  Jul 29: Open Forum  August 26: Guest Speaker: Luna Alicea Registered Dietitian  September 30: Open Forum  October 28th: Guest Speaker: Lucila Otoole [facility], Health , \"Gratitude Practices\".  November 18: Guest Speaker: Yas Dudley RD Registered Dietitian, \"Navigating How to Eat around the Holidays\".  December 16: Guest Speaker: Anabela Oliveira [facility], \"Changing Your Relationship with Movement\".    ____________________________________________________________________________________________________________________________________________________________________________  [place] [facility] Get Moving Program  Our team of physical therapists is trained to help you understand and take control of your " condition. They will perform a thorough evaluation to determine your ability for activity and develop a customized plan to fit your goals and physical ability.  Scheduling: Unsure if the Get Moving program is right for you? Discuss the program with your medical provider or diabetes educator. You can also call us at [phone] to ask questions or schedule an appointment.   [facility] Get Moving Program  ____________________________________________________________________________________________________________________________________________________________________________  [facility] Diabetes Prevention Program (DPP)  If you have prediabetes and Medicare please contact us via [employer] to learn more about the Diabetes Prevention Program (DPP)  Program Details:  [facility] offers the year-long Diabetes Prevention Program (DPP). The program helps you to make lifestyle changes that prevent or delay type 2 diabetes by supporting healthy eating, increased physical activity, stress reduction and use of coping skills.   On average, previous [facility] DPP cohorts have lost and maintained at least 5% of their starting weight throughout the program and averaged more than 150 minutes of physical activity per week.  Participants meet weekly for one-hour group sessions over sixteen weeks, every other week for the next 8 weeks, and monthly for the last six months.   A year-long maintenance program is also available for participants who complete the first year.   Location & Cost:   During the COVID-19 Public Health Emergency, the program is offered virtually. When in-person classes can resume, they will be held at [facility].  For people with Medicare, the program is covered in full. A self-pay option will also be available for those with non-Medicare insurance plans.    _________________________________________________________________________________________________________________________________________________________  Bluetooth Scale:    We hope to provide you with high quality virtual healthcare visits while social distancing for COVID-19 is necessary, as well as in the future when virtual visits may be more convenient for you.     Our technology team made it possible for Bluetooth scales to send weight measurements to our electronic medical record. This allows weights from you weighing at home to securely flow into the medical record, which will improve telephone and virtual visits.   Additionally, studies have shown that adults actually lose more weight when their weights are automatically sent to someone else, and also that this process is not stressful for those adults.    Below is a link for purchasing the scale, with a discount code for our patients. You may call your insurance company to see if they will reimburse you for the cost of the scale, as a piece of durable medical equipment. The scales only go up to a weight of 400 pounds. This is an issue and we are working with the developer on increasing this. We found no scales that go over 400lb that have blue-tooth for connecting to [employer].    Scale to purchase: the [employer]  Body  Scale: [email or web address]=aw.ds    Discount Code: We have a discount code for our patients to bring the cost down to $50, Discount code is: [place]_Scale_20%off  _______________________________________________________________________________________________________________________________________________________________________________    To work with a Behavioral Health Psychologist:    Call to schedule:    Jasmeet Helton - [phone]  Obed Basurto - [phone]  Marina Dias - [phone]  Elizabeth Hobbs - [phone]   Doris Wilburn PhD  (cannot accept Medicare) [phone]        Thank you,   [facility] Comprehensive Weight Management Team

## 2022-12-26 NOTE — TELEPHONE ENCOUNTER
Central Prior Authorization Team   Phone: [phone]      PA Initiation    Medication: Saxenda  Insurance Company: [employer] - Phone [phone] Fax [phone]  Pharmacy Filling the Rx: [id number] [address] AT [address]  Filling Pharmacy Phone: [phone]  Filling Pharmacy Fax:    Start Date: 12/26/2022

## 2022-12-26 NOTE — TELEPHONE ENCOUNTER
PRIOR AUTHORIZATION DENIED    Medication: Saxenda    Denial Date: 12/26/2022    Denial Rational:           Appeal Information:

## 2022-12-27 NOTE — TELEPHONE ENCOUNTER
Saxenda denied, patient needs to try and fail phentermine for at least 12 consecutive weeks. Please advise on how you would like to proceed. If appealed please provide medical rational.

## 2022-12-28 ENCOUNTER — TELEPHONE (OUTPATIENT)
Dept: ENDOCRINOLOGY | Facility: CLINIC | Age: 37
End: 2022-12-28

## 2022-12-28 NOTE — TELEPHONE ENCOUNTER
SELENEM and sent [facility] for scheduling appt with Dietitian visit next available, and f/u with Ana Lilia Falcon or Arlette Corbett in 5 weeks.

## 2022-12-29 ENCOUNTER — TELEPHONE (OUTPATIENT)
Dept: ENDOCRINOLOGY | Facility: CLINIC | Age: 37
End: 2022-12-29

## 2022-12-29 DIAGNOSIS — E66.813 CLASS 3 SEVERE OBESITY DUE TO EXCESS CALORIES WITH SERIOUS COMORBIDITY AND BODY MASS INDEX (BMI) OF 50.0 TO 59.9 IN ADULT (H): ICD-10-CM

## 2022-12-29 DIAGNOSIS — E11.9 TYPE 2 DIABETES MELLITUS WITHOUT COMPLICATION, WITHOUT LONG-TERM CURRENT USE OF INSULIN (H): Primary | ICD-10-CM

## 2022-12-29 DIAGNOSIS — E66.01 CLASS 3 SEVERE OBESITY DUE TO EXCESS CALORIES WITH SERIOUS COMORBIDITY AND BODY MASS INDEX (BMI) OF 50.0 TO 59.9 IN ADULT (H): ICD-10-CM

## 2022-12-29 NOTE — TELEPHONE ENCOUNTER
Prior Authorization Retail Medication Request    Medication/Dose: Ozempic  ICD code (if different than what is on RX):  Type 2 diabetes mellitus without complication, without long-term current use of insulin (H) [E11.9]  - Primary     Previously Tried and Failed:  Weight loss surgery, history of diet and exercise  Rationale:   9 months s/p gastric sleeve .      Recent diet changes:  Feels like she can eat a little more then before. No longer feeling as full. Feels like hunger is well controlled. Eating 1 meal a day with snack. Tolerating food well. Denies odonaphagia, dysphagia, nausea, vomiting  Breakfast - chicken + rice + sauce   Dinner - rice + protein,   Snacks - chips      -Protein? Tries to get protein in every meal. No longer can afford protein shakes   -Water? Needs to increase     Recent exercise/activity changes: very active around house      Initial Weight (lbs): 290 lbs  Weight: 113.4 kg (250 lb) (Patient reported)  Last Visits Weight: 117 kg (258 lb)  Cumulative weight loss (lbs): 40  Weight Loss Percentage: 13.79%        Insurance Name:    Insurance ID:        Pharmacy Information (if different than what is on RX)  Name:  [id number] - [facility], [place] - [address] AT [address]  Phone:  [phone]

## 2022-12-29 NOTE — TELEPHONE ENCOUNTER
PA Initiation    Medication: Ozempic  Insurance Company: [employer] - Phone [phone] Fax [phone]  Pharmacy Filling the Rx:    Filling Pharmacy Phone:    Filling Pharmacy Fax:    Start Date: 12/29/2022    Key:  [id number]

## 2023-01-02 DIAGNOSIS — E11.9 TYPE 2 DIABETES MELLITUS WITHOUT COMPLICATION, WITHOUT LONG-TERM CURRENT USE OF INSULIN (H): Primary | ICD-10-CM

## 2023-01-02 DIAGNOSIS — E66.01 MORBID OBESITY (H): ICD-10-CM

## 2023-01-02 RX ORDER — LIRAGLUTIDE 6 MG/ML
INJECTION SUBCUTANEOUS
Qty: 9 ML | Refills: 1 | Status: SHIPPED | OUTPATIENT
Start: 2023-01-02 | End: 2023-05-02

## 2023-01-02 NOTE — TELEPHONE ENCOUNTER
Ozempic denied, patient needs to try and fail two preferred formularies ( bydureon, byetta, and victoza). Please advise on how you would like to proceed. If appealed please provide medical rational.

## 2023-01-02 NOTE — TELEPHONE ENCOUNTER
PRIOR AUTHORIZATION DENIED    Medication: Ozempic    Denial Date: 12/29/2022    Denial Rational:     Appeal Information:

## 2023-01-10 ENCOUNTER — LAB (OUTPATIENT)
Dept: LAB | Facility: CLINIC | Age: 38
End: 2023-01-10
Payer: COMMERCIAL

## 2023-01-10 DIAGNOSIS — Z98.84 S/P LAPAROSCOPIC SLEEVE GASTRECTOMY: ICD-10-CM

## 2023-01-10 LAB
ALBUMIN SERPL-MCNC: 3.9 G/DL (ref 3.4–5)
ALP SERPL-CCNC: 69 U/L (ref 40–150)
ALT SERPL W P-5'-P-CCNC: 23 U/L (ref 0–50)
ANION GAP SERPL CALCULATED.3IONS-SCNC: 6 MMOL/L (ref 3–14)
AST SERPL W P-5'-P-CCNC: 13 U/L (ref 0–45)
BILIRUB SERPL-MCNC: 1 MG/DL (ref 0.2–1.3)
BUN SERPL-MCNC: 12 MG/DL (ref 7–30)
CALCIUM SERPL-MCNC: 9 MG/DL (ref 8.5–10.1)
CHLORIDE BLD-SCNC: 106 MMOL/L (ref 94–109)
CO2 SERPL-SCNC: 25 MMOL/L (ref 20–32)
CREAT SERPL-MCNC: 0.76 MG/DL (ref 0.52–1.04)
ERYTHROCYTE [DISTWIDTH] IN BLOOD BY AUTOMATED COUNT: 12.2 % (ref 10–15)
FERRITIN SERPL-MCNC: 164 NG/ML (ref 12–150)
GFR SERPL CREATININE-BSD FRML MDRD: >90 ML/MIN/1.73M2
GLUCOSE BLD-MCNC: 89 MG/DL (ref 70–99)
HCT VFR BLD AUTO: 43.8 % (ref 35–47)
HGB BLD-MCNC: 14.4 G/DL (ref 11.7–15.7)
MCH RBC QN AUTO: 29.6 PG (ref 26.5–33)
MCHC RBC AUTO-ENTMCNC: 32.9 G/DL (ref 31.5–36.5)
MCV RBC AUTO: 90 FL (ref 78–100)
PLATELET # BLD AUTO: 290 10E3/UL (ref 150–450)
POTASSIUM BLD-SCNC: 3.8 MMOL/L (ref 3.4–5.3)
PROT SERPL-MCNC: 7.6 G/DL (ref 6.8–8.8)
RBC # BLD AUTO: 4.87 10E6/UL (ref 3.8–5.2)
SODIUM SERPL-SCNC: 137 MMOL/L (ref 133–144)
VIT B12 SERPL-MCNC: 1356 PG/ML (ref 232–1245)
WBC # BLD AUTO: 9.9 10E3/UL (ref 4–11)

## 2023-01-10 PROCEDURE — 82306 VITAMIN D 25 HYDROXY: CPT

## 2023-01-10 PROCEDURE — 80053 COMPREHEN METABOLIC PANEL: CPT

## 2023-01-10 PROCEDURE — 84590 ASSAY OF VITAMIN A: CPT | Mod: 90

## 2023-01-10 PROCEDURE — 85027 COMPLETE CBC AUTOMATED: CPT

## 2023-01-10 PROCEDURE — 36415 COLL VENOUS BLD VENIPUNCTURE: CPT

## 2023-01-10 PROCEDURE — 99000 SPECIMEN HANDLING OFFICE-LAB: CPT

## 2023-01-10 PROCEDURE — 83970 ASSAY OF PARATHORMONE: CPT

## 2023-01-10 PROCEDURE — 82607 VITAMIN B-12: CPT

## 2023-01-10 PROCEDURE — 82728 ASSAY OF FERRITIN: CPT

## 2023-01-11 LAB
DEPRECATED CALCIDIOL+CALCIFEROL SERPL-MC: 20 UG/L (ref 20–75)
PTH-INTACT SERPL-MCNC: 34 PG/ML (ref 15–65)

## 2023-01-14 LAB
ANNOTATION COMMENT IMP: NORMAL
RETINYL PALMITATE SERPL-MCNC: 0.05 MG/L
VIT A SERPL-MCNC: 0.74 MG/L

## 2023-01-30 ENCOUNTER — HEALTH MAINTENANCE LETTER (OUTPATIENT)
Age: 38
End: 2023-01-30

## 2023-02-07 ENCOUNTER — TELEPHONE (OUTPATIENT)
Dept: ENDOCRINOLOGY | Facility: CLINIC | Age: 38
End: 2023-02-07
Payer: COMMERCIAL

## 2023-02-07 DIAGNOSIS — Z98.84 S/P LAPAROSCOPIC SLEEVE GASTRECTOMY: Primary | ICD-10-CM

## 2023-02-07 DIAGNOSIS — E11.9 TYPE 2 DIABETES MELLITUS WITHOUT COMPLICATION, WITHOUT LONG-TERM CURRENT USE OF INSULIN (H): ICD-10-CM

## 2023-02-07 RX ORDER — NEEDLES, SAFETY 18GX1 1/2"
1 NEEDLE, DISPOSABLE MISCELLANEOUS
Qty: 1 EACH | Refills: 3 | Status: SHIPPED | OUTPATIENT
Start: 2023-02-07 | End: 2023-05-02

## 2023-02-07 NOTE — TELEPHONE ENCOUNTER
Called patient to check in regarding the phone message I received.     She is currently taking:     [place] - taking 2 per day, will send me a picture to double check which one.   B12 injection - taking every other month right now due to recent high B12 despite not taking for a month. Plan to continue every 8 weeks at this time.     Recent low Vitamin D  Will plan to increase vitamin D.   Not taking a calcium citrate right now so will add calcium/vitamin D that way.     Due for follow up. Sent message with above information and phone number to schedule.     BUNNY Rivera, RD, LD  Clinic #: [phone]

## 2023-02-07 NOTE — TELEPHONE ENCOUNTER
"Pt needs 2 things:  1) she has B12, but needs script for needles  2) she wants a \"concise\" list of the exact supplements she should be taking. She's found the messages so far confusing?    [phone]  **OK to leave detailed VM  "

## 2023-02-07 NOTE — TELEPHONE ENCOUNTER
Patient has refills of vitamin B12 on file, but no injection supplies. Routed to RN for sign off.

## 2023-03-14 ENCOUNTER — TELEPHONE (OUTPATIENT)
Dept: ENDOCRINOLOGY | Facility: CLINIC | Age: 38
End: 2023-03-14

## 2023-03-14 DIAGNOSIS — A04.8 H. PYLORI INFECTION: Primary | ICD-10-CM

## 2023-03-14 NOTE — TELEPHONE ENCOUNTER
Pt has upcoming appt scheduled and wants to know if she needs labs beforehand, she will go to  location    [phone]

## 2023-05-02 ENCOUNTER — VIRTUAL VISIT (OUTPATIENT)
Dept: ENDOCRINOLOGY | Facility: CLINIC | Age: 38
End: 2023-05-02
Payer: COMMERCIAL

## 2023-05-02 VITALS — BODY MASS INDEX: 42.88 KG/M2 | WEIGHT: 242 LBS | HEIGHT: 63 IN

## 2023-05-02 DIAGNOSIS — E66.01 MORBID OBESITY (H): ICD-10-CM

## 2023-05-02 DIAGNOSIS — E66.813 CLASS 3 SEVERE OBESITY DUE TO EXCESS CALORIES WITH SERIOUS COMORBIDITY AND BODY MASS INDEX (BMI) OF 50.0 TO 59.9 IN ADULT (H): Primary | ICD-10-CM

## 2023-05-02 DIAGNOSIS — E11.9 TYPE 2 DIABETES MELLITUS WITHOUT COMPLICATION, WITHOUT LONG-TERM CURRENT USE OF INSULIN (H): ICD-10-CM

## 2023-05-02 DIAGNOSIS — E66.01 CLASS 3 SEVERE OBESITY DUE TO EXCESS CALORIES WITH SERIOUS COMORBIDITY AND BODY MASS INDEX (BMI) OF 50.0 TO 59.9 IN ADULT (H): Primary | ICD-10-CM

## 2023-05-02 DIAGNOSIS — Z98.84 S/P LAPAROSCOPIC SLEEVE GASTRECTOMY: ICD-10-CM

## 2023-05-02 PROCEDURE — 99214 OFFICE O/P EST MOD 30 MIN: CPT | Mod: VID | Performed by: NURSE PRACTITIONER

## 2023-05-02 RX ORDER — TOPIRAMATE 25 MG/1
TABLET, FILM COATED ORAL
Qty: 90 TABLET | Refills: 1 | Status: SHIPPED | OUTPATIENT
Start: 2023-05-02 | End: 2023-09-07

## 2023-05-02 RX ORDER — NEEDLES, SAFETY 18GX1 1/2"
1 NEEDLE, DISPOSABLE MISCELLANEOUS
Qty: 1 EACH | Refills: 3 | Status: SHIPPED | OUTPATIENT
Start: 2023-05-02 | End: 2023-09-07

## 2023-05-02 RX ORDER — LIRAGLUTIDE 6 MG/ML
1.8 INJECTION SUBCUTANEOUS DAILY
Qty: 9 ML | Refills: 3 | Status: SHIPPED | OUTPATIENT
Start: 2023-05-02 | End: 2023-09-07

## 2023-05-02 RX ORDER — LIRAGLUTIDE 6 MG/ML
1.8 INJECTION SUBCUTANEOUS DAILY
Qty: 9 ML | Refills: 3 | OUTPATIENT
Start: 2023-05-02 | End: 2023-05-02

## 2023-05-02 NOTE — ASSESSMENT & PLAN NOTE
Currently taking 1.8 of victoza, states does feel like it is doing much but does feel full faster. Denies side effects of heartburn, nausea, diarrhea, or constipation. 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 Also states she has been drinking alcohol about twice a month. Reports adequate water intake but decreased protein as she has not been drinking protein shakes as cost is a barrier. Reports increased stress related to a death in the family, offered psychology consult but declines at this time.     Plan:  Continue victoza 1.8mg daily  Start topiramate   Complete H. Pylori stool test  Continue B12 injections every other month   Follow up with Arlette in 3 months

## 2023-05-02 NOTE — LETTER
2023       RE: Babita Rivas  [address]  [facility] [address]     Dear Colleague,    Thank you for referring your patient, Babita Rivas, to the [facility] [place] [facility]. Please see a copy of my visit note below.    Virtual Visit Check-In    During this virtual visit the patient is located in [place], patient verifies this as the location during the entirety of this visit.     Babita is a 38 year old who is being evaluated via a billable video visit.      How would you like to obtain your AVS? MyChart  If the video visit is dropped, the invitation should be resent by: Text to cell phone: [phone]  Will anyone else be joining your video visit? No        Video-Visit Details    Originating Location (pt. Location): Home    Distant Location (provider location):  On-site  Platform used for Video Visit: [email or web address]         Video Start Time: 1138  Video End Time:1201    Dionna Sal EMT          Return Bariatric Surgery Note    RE: Babita Rivas  MR#: [id number]  : 1985  VISIT DATE: May 2, 2023      Dear Elizabeth Pascual,    I had the pleasure of seeing your patient, Babita Rivas, in my post-bariatric surgery assessment clinic.    Assessment & Plan   Problem List Items Addressed This Visit          Digestive    Class 3 severe obesity due to excess calories with serious comorbidity and body mass index (BMI) of 50.0 to 59.9 in adult (H) - Primary     Currently taking 1.8 of victoza, states does feel like it is doing much but does feel full faster. Denies side effects of heartburn, nausea, diarrhea, or constipation. 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 Also states she has been drinking alcohol about twice a month. Reports adequate water intake but decreased protein as she has not been drinking protein shakes as cost is a barrier. Reports increased stress related to a death in the  "family, offered psychology consult but declines at this time.     Plan:  Continue victoza 1.8mg daily  Start topiramate   Complete H. Pylori stool test  Continue B12 injections every other month   Follow up with Arlette in 3 months          Relevant Medications    topiramate (TOPAMAX) 25 MG tablet    liraglutide (VICTOZA) 18 MG/3ML solution       Endocrine    Diabetes mellitus, type 2 (H)    Relevant Medications    insulin pen needle (31G X 5 MM) 31G X 5 MM miscellaneous    liraglutide (VICTOZA) 18 MG/3ML solution       Other    S/P laparoscopic sleeve gastrectomy    Relevant Medications    Syringe/Needle, Disp, (BD ECLIPSE SYRINGE/NEEDLE) 25G X 5/8\" 3 ML MISC     Other Visit Diagnoses       Morbid obesity (H)        Relevant Medications    insulin pen needle (31G X 5 MM) 31G X 5 MM miscellaneous    liraglutide (VICTOZA) 18 MG/3ML solution               30 minutes spent by me on the date of the encounter doing chart review, history and exam, documentation and further activities per the note    CHIEF COMPLAINT: Post-bariatric surgery follow-up. 1 years s/p gastric sleeve with Dr. Fitzpatrick, 3/22/22.    HISTORY OF PRESENT ILLNESS:      5/2/2023    11:01 AM   Questions Regarding Prior Weight Loss Surgery Reviewed With Patient   I had the following weight loss procedure Sleeve Gastrectomy   What year was your surgery? 2022   How has your weight changed since your last visit? I have stayed about the same   Do you currently have any of the following None of the above   Do you have any concerns today? I don t feel like I m losing Weight anymore         Anti-obesity medications:     Current:   Victoza 1.8mg - denies SE. Has been feeling moran faster.      Recent diet changes:   Breakfast - eggroll, orange, gatorade  Snack: Premier protein  Lunch - stir rivera with veggies,   Dinner - chips and salsa, rotisserie chicken - varies  -Protein? Stopped drinking protein shakes due to cost.   -Water? Water, zero powerade/gatorade    Works " overnight - snacks on cheese and crackers  Drinking alcohol with redbull 2x a month    Recent stressors:   Death in family    Vitamins/Labs: Labs done 1/10/23. Needs to collect H. Pylori. B12 elevated and patient has been getting B12 injections every other month.     Weight History:      5/2/2023    11:01 AM   --   What is your highest lifetime weight? 295   What is your lowest weight since surgery? (In pounds) 238.8     Initial Weight (lbs): 290 lbs  Weight: 109.8 kg (242 lb)  Last Visits Weight: 113.4 kg (250 lb)  Cumulative weight loss (lbs): 48  Weight Loss Percentage: 16.55%    Wt Readings from Last 5 Encounters:   05/02/23 109.8 kg (242 lb)   12/23/22 113.4 kg (250 lb)   07/20/22 112.5 kg (248 lb)   04/26/22 117 kg (258 lb)   03/29/22 123.3 kg (271 lb 14.4 oz)           5/2/2023    11:01 AM   Questions Regarding Co-Morbidities and Health Concerns Reviewed With Patient   Pre-diabetes Never   Diabetes II Gone away   High Blood Pressure Never   High cholesterol Never   Heartburn/Reflux Never   Sleep apnea Never   PCOS Never   Back pain Never   Joint pain Never   Lower leg swelling Improved           5/2/2023    11:01 AM   Eating Habits   How many meals do you eat per day? 2   Do you snack between meals? No   How much food are you eating at each meal? 1/2 cup to 1 cup   Are you able to separate your meals and liquids by at least 30 minutes? Sometimes   Are you able to avoid liquid calories? Yes           5/2/2023    11:01 AM   Exercise Questions Reviewed With Patient   How often do you exercise? 1 to 2 times per week   What is the duration of your exercise (in minutes)? 15 Minutes   What types of exercise do you do? walking   What keeps you from being more active? I am as active as I can possbily be       Social History:      5/2/2023    11:01 AM   --   How much alcohol? Special days only       Medications:  Current Outpatient Medications   Medication    acetaminophen (TYLENOL) 325 MG tablet    calcium  "Citrate-vitamin D 500-400 MG-UNIT CHEW    cyanocobalamin (CYANOCOBALAMIN) 1000 MCG/ML injection    hydrOXYzine (ATARAX) 10 MG tablet    insulin pen needle (31G X 5 MM) 31G X 5 MM miscellaneous    liraglutide (VICTOZA) 18 MG/3ML solution    sertraline (ZOLOFT) 25 MG tablet    Syringe/Needle, Disp, (BD ECLIPSE SYRINGE/NEEDLE) 25G X 5/8\" 3 ML MISC    topiramate (TOPAMAX) 25 MG tablet     No current facility-administered medications for this visit.         1/10/2023     1:08 PM   --   Do you avoid NSAIDs such as (Ibuprofen, Aleve, Naproxen, Advil)?   Yes       ROS:  GI:       5/2/2023    11:01 AM   --   Stress urinary incontinence No     Skin:       1/10/2023     1:08 PM   BAR RBS ROS - SKIN   Rash in skin folds: No     Psych:       1/10/2023     1:08 PM   --   Depression: Yes   Anxiety: No     Female Only:       5/2/2023    11:01 AM   BAR RBS ROS - FEMALE ONLY   Female only Regular menstrual cycles       Lab on 01/10/2023   Component Date Value Ref Range Status    Vitamin D, Total (25-Hydroxy) 01/10/2023 20  20 - 75 ug/L Final    Vitamin B12 01/10/2023 1,356 (H)  232 - 1,245 pg/mL Final    Ferritin 01/10/2023 164 (H)  12 - 150 ng/mL Final    Parathyroid Hormone Intact 01/10/2023 34  15 - 65 pg/mL Final    Vitamin A 01/10/2023 0.74  0.30 - 1.20 mg/L Final    Retinol Palmitate 01/10/2023 0.05  0.00 - 0.10 mg/L Final    Vitamin A Interp 01/10/2023 Normal   Final      This test was developed and its performance characteristics   determined by [facility]. It has not been cleared or   approved by the US Food and Drug Administration. This test   was performed in a CLIA certified laboratory and is   intended for clinical purposes.  Performed By: [facility]  [address] [place] [id number]  : Jeffrey Fagan MD, PhD    Sodium 01/10/2023 137  133 - 144 mmol/L Final    Potassium 01/10/2023 3.8  3.4 - 5.3 mmol/L Final    Chloride 01/10/2023 106  94 - 109 mmol/L Final    Carbon Dioxide " "(CO2) 01/10/2023 25  20 - 32 mmol/L Final    Anion Gap 01/10/2023 6  3 - 14 mmol/L Final    Urea Nitrogen 01/10/2023 12  7 - 30 mg/dL Final    Creatinine 01/10/2023 0.76  0.52 - 1.04 mg/dL Final    Calcium 01/10/2023 9.0  8.5 - 10.1 mg/dL Final    Glucose 01/10/2023 89  70 - 99 mg/dL Final    Alkaline Phosphatase 01/10/2023 69  40 - 150 U/L Final    AST 01/10/2023 13  0 - 45 U/L Final    ALT 01/10/2023 23  0 - 50 U/L Final    Protein Total 01/10/2023 7.6  6.8 - 8.8 g/dL Final    Albumin 01/10/2023 3.9  3.4 - 5.0 g/dL Final    Bilirubin Total 01/10/2023 1.0  0.2 - 1.3 mg/dL Final    GFR Estimate 01/10/2023 >90  >60 mL/min/1.73m2 Final    Effective December 21, 2021 eGFRcr in adults is calculated using the 2021 CKD-EPI creatinine equation which includes age and gender (Edward et al., NE, DOI: 10.1056/YILWaf8674325)    WBC Count 01/10/2023 9.9  4.0 - 11.0 10e3/uL Final    RBC Count 01/10/2023 4.87  3.80 - 5.20 10e6/uL Final    Hemoglobin 01/10/2023 14.4  11.7 - 15.7 g/dL Final    Hematocrit 01/10/2023 43.8  35.0 - 47.0 % Final    MCV 01/10/2023 90  78 - 100 fL Final    MCH 01/10/2023 29.6  26.5 - 33.0 pg Final    MCHC 01/10/2023 32.9  31.5 - 36.5 g/dL Final    RDW 01/10/2023 12.2  10.0 - 15.0 % Final    Platelet Count 01/10/2023 290  150 - 450 10e3/uL Final     Age less than 45, no DEXA indicated.    PHYSICAL EXAM:  Objective    Ht 1.6 m (5' 3\")   Wt 109.8 kg (242 lb)   BMI 42.87 kg/m           Vitals:  No vitals were obtained today due to virtual visit.    Physical Exam   GENERAL: Healthy, alert and no distress  EYES: Eyes grossly normal to inspection.  No discharge or erythema, or obvious scleral/conjunctival abnormalities.  RESP: No audible wheeze, cough, or visible cyanosis.  No visible retractions or increased work of breathing.    SKIN: Visible skin clear. No significant rash, abnormal pigmentation or lesions.  NEURO: Cranial nerves grossly intact.  Mentation and speech appropriate for age.  PSYCH: Mentation " appears normal, affect normal/bright, judgement and insight intact, normal speech and appearance well-groomed.        Sincerely,    Jl Kimble, [employer] student      Arlette Corbett, CNP

## 2023-05-02 NOTE — NURSING NOTE
Chief Complaint   Patient presents with     Follow Up     Return after bariatric surgery.         Vitals:    05/02/23 1110   Weight: 242 lb       Body mass index is 42.87 kg/m .      Dionna Sanders, EMT  Surgery Clinic

## 2023-05-02 NOTE — PROGRESS NOTES
Virtual Visit Check-In    During this virtual visit the patient is located in [place], patient verifies this as the location during the entirety of this visit.     Babita is a 38 year old who is being evaluated via a billable video visit.      How would you like to obtain your AVS? MyChart  If the video visit is dropped, the invitation should be resent by: Text to cell phone: [phone]  Will anyone else be joining your video visit? No        Video-Visit Details    Originating Location (pt. Location): Home    Distant Location (provider location):  On-site  Platform used for Video Visit: [employer]         Video Start Time: 1138  Video End Time:1201    Dionna Sal, EMT

## 2023-05-02 NOTE — PATIENT INSTRUCTIONS
"Thank you for allowing us the privilege of caring for you. We hope we provided you with the excellent service you deserve.   Please let us know if there is anything else we can do for you so that we can be sure you are completely satisfied with your care experience.    To ensure the quality of our services you may be receiving a patient satisfaction survey from an independent patient satisfaction monitoring company.    The greatest compliment you can give is a \"Likely to Recommend\"    Your visit was with Arlette Corbett today.    Instructions per today's visit:   Plan:  Continue victoza 1.8mg daily  Start topiramate   Complete H. Pylori stool test  Continue B12 injections every other month   Follow up with Arlette in 3 months     _________________________________________________________________________________________________________________________________________________________  Important contact and scheduling information:  Please call our contact center at [phone] to schedule your next appointments.  To find a lab location near you, please call [phone].  For any nursing questions or concerns call Carmina Cavazos LPN at [phone] or Sandrine Salazar RN at [phone]  Please call during clinic hours Monday through Friday 8:00a - 4:00p if you have questions or you can contact us via Ensynhart at anytime and we will reply during clinic hours.    Lab results will be communicated through My Chart or letter (if My Chart not used). Please call the clinic if you have not received communication after 1 week or if you have any questions.?  Clinic Fax: [phone]  _________________________________________________________________________________________________________________________________________________________  If you are asked by your clinic team to have your blood pressure checked:  [facility] do offer several locations for blood pressure checks. Please follow the below link to schedule an appointment. " Scheduling an appointment at the pharmacy for a blood pressure check is now preferred.    Appointment Plus ([email or web address])  _________________________________________________________________________________________________________________________________________________________  Meal Replacement Products:    Here is the link to our new e-store where you can purchase our meal replacement products     [email or web address] E-Store  Hopper."1,2,3 Listo"/store    The one week starter kit is a great way to sample a variety of products and see what works for you.    If you want more information about the product go to: Fresh Steps Meals  [email or web address]    If you are an employee or [facility] or [facility] please contact your care team for a 10% estore discount    Free Shipping for orders over $75     Benefits of meal replacements products:    Portion and calorie control  Improved nutrition  Structured eating  Simplified food choices  Avoid contact with trigger foods  _________________________________________________________________________________________________________________________________________________________  Interested in working with a health ?  Health coaches work with you to improve your overall health and wellbeing.  They look at the whole person, and may involve discussion of different areas of life, including, but not limited to the four pillars of health (sleep, exercise, nutrition, and stress management). Discuss with your care team if you would like to start working a health .  Health Coaching-3 Pack: Schedule by calling [phone]    $99 for three health coaching visits    Visits may be done in person or via phone    Coaching is a partnership between the  and the client; Coaches do not prescribe or diagnose    Coaching helps inspire the client to reach his/her personal goals    _________________________________________________________________________________________________________________________________________________________  24 Week Healthy Lifestyle Plan:    Our mission in the 24-week Healthy Lifestyle Plan is to provide you with individualized care by giving you the tools, education and support you need to lose weight and maintain a healthy lifestyle. In your 24-week journey, you ll be supported by a dedicated weight loss team that includes registered dietitians, medical weight management providers, health coaches, and nurses -- all with special expertise in weight loss -- to help you every step of the way.     Monthly meetings with your registered dietician or medical weight management provider help to review your progress, update your care plan, and make any adjustments needed to ensure success. Between these visits, weekly and bi-weekly health  visits will help you focus on the four pillars of weight loss -- stress, sleep, nutrition, and exercise -- and how you can best adapt each to achieve sustainable weight loss results.    In addition, you will be given exclusive access to online wellbeing classes through [employer].  Your initial visit will be with a medical weight management provider who will help to understand your weight loss goals and ensure this program is the right fit for you. Please let our team know if you are interested in the 24 week plan by sending a message to your care team or calling [phone] to schedule.  _________________________________________________________________________________________________________________________________________________________    COMPREHENSIVE WEIGHT MANAGEMENT PROGRAM  VIRTUAL SUPPORT GROUPS    For Support Group Information:      We offer support groups for patients who are working on weight loss and considering, preparing for or have had weight loss surgery.   There is no cost for this opportunity.  You are invited to  "attend the?Virtual Support Groups?provided by any of the following locations:    [facility] via BPT Teams with Shayy Hurley RN  2.   [place] via BPT Teams with Abel Elizabeth, PhD, LP  3.   [place] via BPT Teams with Kira Richardson RN  4.   [facility] via BPT Teams with Kira Hartley [facility]    The following Support Group information can also be found on our website:  [email or web address]    [facility] Weight Loss Surgery Support Group    [facility] Weight Loss Surgery Support Group  The support group is a patient-lead forum that meets monthly to share experiences, encouragement and education. It is open to those who have had weight loss surgery, are scheduled for surgery, and those who are considering surgery.   WHEN: This group meets on the 3rd Wednesday of each month from 5:00PM - 6:00PM virtually using Microsoft Teams.   FACILITATOR: Led by Shayy Hurley RD, VAUGHN, RN, the program's Clinical Coordinator.   TO REGISTER: Please contact the clinic via [employer] or call the nurse line directly at [phone] to inform our staff that you would like an invite sent to you and to let us know the email you would like the invite sent to. Prior to the meeting, a link with directions on how to join the meeting will be sent to you.    2022 Meetings  Benita 15: \"Let's Talk\" a time for the group to share.  July 20: \"Let's Talk\" a time for the group to share.  August 17: \"Let's Talk\" a time for the group to share.  September 21: \"Let's Talk\" a time for the group to share.  October 19: Guest Speaker: Dr Eb Begum MD Pulmonologist and Sleep Medicine Physician, \"Getting a Good Night's Sleep\".  November 16: \"Let's Talk\" a time for the group to share.  December 21: \"Let's Talk\" a time for the group to share.    [facility] Support Groups    Connections: Bariatric Care Support " "Group?  This is open to all [facility] (and those external to this program) pre- and post- operative bariatric surgery patients as well as their support system.   WHEN: This group meets the 2nd Tuesday of each month from 6:30 PM - 8:00 PM virtually using Microsoft Teams.   FACILITATOR: Led by Abel Elizabeth, Ph.D who is a Licensed Psychologist with the [facility] Comprehensive Weight Management Program.   TO REGISTER: Please send an email to Abel Elizabeth, Ph.D.,  at?[email or web address]?if you would like an invitation to the group and to learn about using Microsoft Teams.    2022 Meetings June 14: Heydi Rucker, SCOTTY, LD at [facility], \"Nutritional Labeling\"  July 12 August 2 (Please Note Date Change)  September 13 October 11 November 8 December 13    Connections: Post-Operative Bariatric Surgery Support Group  This is a support group for [facility] bariatric patients (and those external to [facility]) who have had bariatric surgery and are at least 3 months post-surgery.  WHEN: This support group meets the 4th Wednesday of the month from 11:00 AM - 12:00 PM virtually using Microsoft Teams.   FACILITATOR: Led by Certified Bariatric Nurse, Kira Richardson RN.   TO REGISTER: Please send an email to Kira at [email or web address] if you would like an invitation to the group and to learn about using Microsoft Teams.    2022 Meetings June 22 July 27 August 24 September 28 October 26 November 23 December 28      [facility] Healthy Lifestyle Virtual Support Group    Healthy Lifestyle Virtual Support Group?  This is 60 minutes of small group guided discussion, support and resources. All are welcome who want a healthy lifestyle.  WHEN: This group meets monthly on a Friday from 12:30 PM - 1:30 PM virtually using Microsoft Teams.   FACILITATOR: Led by National Board Certified Health and , DONI [facility].   TO " "REGISTER: Please send an email to Kira [email or web address] to receive monthly invites to the group or if you have any questions about having a health .  Prior to the meeting, a link with directions on how to join the meeting will be sent to you.    2022 Meetings  June 24: Kira Hartley, [facility], \"Setting Limits and Boundaries\".  Jul 29: Open Forum  August 26: Guest Speaker: Luna Alicea, Registered Dietitian  September 30: Open Forum  October 28th: Guest Speaker: Lucila Otoole, [facility], Health , \"Gratitude Practices\".  November 18: Guest Speaker: Yas Dudley RD Registered Dietitian, \"Navigating How to Eat around the Holidays\".  December 16: Guest Speaker: Anabela Oliveira, [facility], \"Changing Your Relationship with Movement\".    ____________________________________________________________________________________________________________________________________________________________________________  [facility] Get Moving Program  Our team of physical therapists is trained to help you understand and take control of your condition. They will perform a thorough evaluation to determine your ability for activity and develop a customized plan to fit your goals and physical ability.  Scheduling: Unsure if the Get Moving program is right for you? Discuss the program with your medical provider or diabetes educator. You can also call us at [phone] to ask questions or schedule an appointment.   [facility] Get Moving Program  ____________________________________________________________________________________________________________________________________________________________________________  [facility] Diabetes Prevention Program (DPP)  If you have prediabetes and Medicare please contact us via [employer] to learn more about the Diabetes Prevention Program (DPP)  Program Details:   [facility] offers the year-long Diabetes Prevention Program (DPP). The program helps you to make " lifestyle changes that prevent or delay type 2 diabetes by supporting healthy eating, increased physical activity, stress reduction and use of coping skills.   On average, previous [facility] DPP cohorts have lost and maintained at least 5% of their starting weight throughout the program and averaged more than 150 minutes of physical activity per week.  Participants meet weekly for one-hour group sessions over sixteen weeks, every other week for the next 8 weeks, and monthly for the last six months.   A year-long maintenance program is also available for participants who complete the first year.   Location & Cost:   During the COVID-19 Public Health Emergency, the program is offered virtually. When in-person classes can resume, they will be held at [facility].  For people with Medicare, the program is covered in full. A self-pay option will also be available for those with non-Medicare insurance plans.   _______________________________________________________________________________________________________________________________________________________________________________    To work with a Behavioral Health Psychologist:    Call to schedule:    Jasmeet Helton - [phone]  Obed Basurto - [phone]  Marina Dias - [phone]  Elizabeth Hobbs - [phone]   Doris Wilburn PhD (cannot accept Medicare) [phone]        Thank you,   [facility] Comprehensive Weight Management Team

## 2023-05-02 NOTE — PROGRESS NOTES
"Return Bariatric Surgery Note    RE: Babita Rivas  MR#: [id number]  : 1985  VISIT DATE: May 2, 2023      Dear Brothpatricio, Elizabeth,    I had the pleasure of seeing your patient, Babita Rivas, in my post-bariatric surgery assessment clinic.    Assessment & Plan   Problem List Items Addressed This Visit        Digestive    Class 3 severe obesity due to excess calories with serious comorbidity and body mass index (BMI) of 50.0 to 59.9 in adult (H) - Primary     Currently taking 1.8 of victoza, states does feel like it is doing much but does feel full faster. Denies side effects of heartburn, nausea, diarrhea, or constipation. 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 Also states she has been drinking alcohol about twice a month. Reports adequate water intake but decreased protein as she has not been drinking protein shakes as cost is a barrier. Reports increased stress related to a death in the family, offered psychology consult but declines at this time.     Plan:  Continue victoza 1.8mg daily  Start topiramate   Complete H. Pylori stool test  Continue B12 injections every other month   Follow up with Arlette in 3 months          Relevant Medications    topiramate (TOPAMAX) 25 MG tablet    liraglutide (VICTOZA) 18 MG/3ML solution       Endocrine    Diabetes mellitus, type 2 (H)    Relevant Medications    insulin pen needle (31G X 5 MM) 31G X 5 MM miscellaneous    liraglutide (VICTOZA) 18 MG/3ML solution       Other    S/P laparoscopic sleeve gastrectomy    Relevant Medications    Syringe/Needle, Disp, (BD ECLIPSE SYRINGE/NEEDLE) 25G X 5/8\" 3 ML MISC   Other Visit Diagnoses     Morbid obesity (H)        Relevant Medications    insulin pen needle (31G X 5 MM) 31G X 5 MM miscellaneous    liraglutide (VICTOZA) 18 MG/3ML solution             30 minutes spent by me on the date of the encounter doing chart review, history and exam, documentation and further activities per the note    CHIEF COMPLAINT: " Post-bariatric surgery follow-up. 1 years s/p gastric sleeve with Dr. Fitzpatrick, 3/22/22.    HISTORY OF PRESENT ILLNESS:      5/2/2023    11:01 AM   Questions Regarding Prior Weight Loss Surgery Reviewed With Patient   I had the following weight loss procedure Sleeve Gastrectomy   What year was your surgery? 2022   How has your weight changed since your last visit? I have stayed about the same   Do you currently have any of the following None of the above   Do you have any concerns today? I don t feel like I m losing Weight anymore         Anti-obesity medications:     Current:   Victoza 1.8mg - denies SE. Has been feeling moran faster.      Recent diet changes:   Breakfast - eggroll, orange, gatorade  Snack: Premier protein  Lunch - stir rivera with veggies,   Dinner - chips and salsa, rotisserie chicken - varies  -Protein? Stopped drinking protein shakes due to cost.   -Water? Water, zero powerade/gatorade    Works overnight - snacks on cheese and crackers  Drinking alcohol with redbull 2x a month    Recent stressors:   Death in family    Vitamins/Labs: Labs done 1/10/23. Needs to collect H. Pylori. B12 elevated and patient has been getting B12 injections every other month.     Weight History:      5/2/2023    11:01 AM   --   What is your highest lifetime weight? 295   What is your lowest weight since surgery? (In pounds) 238.8     Initial Weight (lbs): 290 lbs  Weight: 109.8 kg (242 lb)  Last Visits Weight: 113.4 kg (250 lb)  Cumulative weight loss (lbs): 48  Weight Loss Percentage: 16.55%    Wt Readings from Last 5 Encounters:   05/02/23 109.8 kg (242 lb)   12/23/22 113.4 kg (250 lb)   07/20/22 112.5 kg (248 lb)   04/26/22 117 kg (258 lb)   03/29/22 123.3 kg (271 lb 14.4 oz)           5/2/2023    11:01 AM   Questions Regarding Co-Morbidities and Health Concerns Reviewed With Patient   Pre-diabetes Never   Diabetes II Gone away   High Blood Pressure Never   High cholesterol Never   Heartburn/Reflux Never   Sleep apnea  "Never   PCOS Never   Back pain Never   Joint pain Never   Lower leg swelling Improved           5/2/2023    11:01 AM   Eating Habits   How many meals do you eat per day? 2   Do you snack between meals? No   How much food are you eating at each meal? 1/2 cup to 1 cup   Are you able to separate your meals and liquids by at least 30 minutes? Sometimes   Are you able to avoid liquid calories? Yes           5/2/2023    11:01 AM   Exercise Questions Reviewed With Patient   How often do you exercise? 1 to 2 times per week   What is the duration of your exercise (in minutes)? 15 Minutes   What types of exercise do you do? walking   What keeps you from being more active? I am as active as I can possbily be       Social History:      5/2/2023    11:01 AM   --   How much alcohol? Special days only       Medications:  Current Outpatient Medications   Medication     acetaminophen (TYLENOL) 325 MG tablet     calcium Citrate-vitamin D 500-400 MG-UNIT CHEW     cyanocobalamin (CYANOCOBALAMIN) 1000 MCG/ML injection     hydrOXYzine (ATARAX) 10 MG tablet     insulin pen needle (31G X 5 MM) 31G X 5 MM miscellaneous     liraglutide (VICTOZA) 18 MG/3ML solution     sertraline (ZOLOFT) 25 MG tablet     Syringe/Needle, Disp, (BD ECLIPSE SYRINGE/NEEDLE) 25G X 5/8\" 3 ML MISC     topiramate (TOPAMAX) 25 MG tablet     No current facility-administered medications for this visit.         1/10/2023     1:08 PM   --   Do you avoid NSAIDs such as (Ibuprofen, Aleve, Naproxen, Advil)?   Yes       ROS:  GI:       5/2/2023    11:01 AM   --   Stress urinary incontinence No     Skin:       1/10/2023     1:08 PM   BAR RBS ROS - SKIN   Rash in skin folds: No     Psych:       1/10/2023     1:08 PM   --   Depression: Yes   Anxiety: No     Female Only:       5/2/2023    11:01 AM   BAR RBS ROS - FEMALE ONLY   Female only Regular menstrual cycles       Lab on 01/10/2023   Component Date Value Ref Range Status     Vitamin D, Total (25-Hydroxy) 01/10/2023 20  20 - " 75 ug/L Final     Vitamin B12 01/10/2023 1,356 (H)  232 - 1,245 pg/mL Final     Ferritin 01/10/2023 164 (H)  12 - 150 ng/mL Final     Parathyroid Hormone Intact 01/10/2023 34  15 - 65 pg/mL Final     Vitamin A 01/10/2023 0.74  0.30 - 1.20 mg/L Final     Retinol Palmitate 01/10/2023 0.05  0.00 - 0.10 mg/L Final     Vitamin A Interp 01/10/2023 Normal   Final      This test was developed and its performance characteristics   determined by [facility]. It has not been cleared or   approved by the US Food and Drug Administration. This test   was performed in a CLIA certified laboratory and is   intended for clinical purposes.  Performed By: [facility]  [address] [place] [id number]  : Jeffrey Fagan MD, PhD     Sodium 01/10/2023 137  133 - 144 mmol/L Final     Potassium 01/10/2023 3.8  3.4 - 5.3 mmol/L Final     Chloride 01/10/2023 106  94 - 109 mmol/L Final     Carbon Dioxide (CO2) 01/10/2023 25  20 - 32 mmol/L Final     Anion Gap 01/10/2023 6  3 - 14 mmol/L Final     Urea Nitrogen 01/10/2023 12  7 - 30 mg/dL Final     Creatinine 01/10/2023 0.76  0.52 - 1.04 mg/dL Final     Calcium 01/10/2023 9.0  8.5 - 10.1 mg/dL Final     Glucose 01/10/2023 89  70 - 99 mg/dL Final     Alkaline Phosphatase 01/10/2023 69  40 - 150 U/L Final     AST 01/10/2023 13  0 - 45 U/L Final     ALT 01/10/2023 23  0 - 50 U/L Final     Protein Total 01/10/2023 7.6  6.8 - 8.8 g/dL Final     Albumin 01/10/2023 3.9  3.4 - 5.0 g/dL Final     Bilirubin Total 01/10/2023 1.0  0.2 - 1.3 mg/dL Final     GFR Estimate 01/10/2023 >90  >60 mL/min/1.73m2 Final    Effective December 21, 2021 eGFRcr in adults is calculated using the 2021 CKD-EPI creatinine equation which includes age and gender (Edward et al., NEJ, DOI: 10.1056/NQWBgx1858752)     WBC Count 01/10/2023 9.9  4.0 - 11.0 10e3/uL Final     RBC Count 01/10/2023 4.87  3.80 - 5.20 10e6/uL Final     Hemoglobin 01/10/2023 14.4  11.7 - 15.7 g/dL Final      "Hematocrit 01/10/2023 43.8  35.0 - 47.0 % Final     MCV 01/10/2023 90  78 - 100 fL Final     MCH 01/10/2023 29.6  26.5 - 33.0 pg Final     MCHC 01/10/2023 32.9  31.5 - 36.5 g/dL Final     RDW 01/10/2023 12.2  10.0 - 15.0 % Final     Platelet Count 01/10/2023 290  150 - 450 10e3/uL Final     Age less than 45, no DEXA indicated.    PHYSICAL EXAM:  Objective    Ht 1.6 m (5' 3\")   Wt 109.8 kg (242 lb)   BMI 42.87 kg/m           Vitals:  No vitals were obtained today due to virtual visit.    Physical Exam   GENERAL: Healthy, alert and no distress  EYES: Eyes grossly normal to inspection.  No discharge or erythema, or obvious scleral/conjunctival abnormalities.  RESP: No audible wheeze, cough, or visible cyanosis.  No visible retractions or increased work of breathing.    SKIN: Visible skin clear. No significant rash, abnormal pigmentation or lesions.  NEURO: Cranial nerves grossly intact.  Mentation and speech appropriate for age.  PSYCH: Mentation appears normal, affect normal/bright, judgement and insight intact, normal speech and appearance well-groomed.        Sincerely,    [employer] student      Arlette Corbett CNP  "

## 2023-05-04 ENCOUNTER — NURSE TRIAGE (OUTPATIENT)
Dept: NURSING | Facility: CLINIC | Age: 38
End: 2023-05-04
Payer: COMMERCIAL

## 2023-05-04 NOTE — TELEPHONE ENCOUNTER
Patient calling with a terrible headache, and wants to know what she can take, because she was told in the past that there was an over-the-counter medication that she shouldn't take anymore.     Patient's post-op AVS located in the chart, which states she is not to take any NSAID medication, but may take acetaminophen. Patient informed, and she ended the call. Unable to triage.     Elayne Sommers RN  [facility]  May 4, 2023, 4:04 PM    Reason for Disposition    Information only question and nurse able to answer    Additional Information    Negative: Nursing judgment    Negative: Nursing judgment    Negative: Nursing judgment    Negative: Nursing judgment    Protocols used: INFORMATION ONLY CALL - NO TRIAGE-A-[place]

## 2023-05-08 ENCOUNTER — HEALTH MAINTENANCE LETTER (OUTPATIENT)
Age: 38
End: 2023-05-08

## 2023-05-08 DIAGNOSIS — Z71.3 NUTRITIONAL COUNSELING: ICD-10-CM

## 2023-05-08 DIAGNOSIS — Z98.84 S/P LAPAROSCOPIC SLEEVE GASTRECTOMY: ICD-10-CM

## 2023-05-08 DIAGNOSIS — E55.9 VITAMIN D DEFICIENCY: ICD-10-CM

## 2023-05-08 DIAGNOSIS — E11.9 TYPE 2 DIABETES MELLITUS WITHOUT COMPLICATION, WITHOUT LONG-TERM CURRENT USE OF INSULIN (H): ICD-10-CM

## 2023-05-08 DIAGNOSIS — E66.01 MORBID OBESITY (H): ICD-10-CM

## 2023-05-10 RX ORDER — CYANOCOBALAMIN 1000 UG/ML
1 INJECTION, SOLUTION INTRAMUSCULAR; SUBCUTANEOUS
Qty: 1 ML | Refills: 3 | Status: SHIPPED | OUTPATIENT
Start: 2023-05-10 | End: 2023-09-07

## 2023-05-10 NOTE — TELEPHONE ENCOUNTER
Last Clinic Visit: 5/2/2023  [facility] [place]  NEXT APPOINTMENT NONE

## 2023-09-07 ENCOUNTER — VIRTUAL VISIT (OUTPATIENT)
Dept: ENDOCRINOLOGY | Facility: CLINIC | Age: 38
End: 2023-09-07
Payer: COMMERCIAL

## 2023-09-07 DIAGNOSIS — Z98.84 S/P LAPAROSCOPIC SLEEVE GASTRECTOMY: ICD-10-CM

## 2023-09-07 DIAGNOSIS — E66.01 MORBID OBESITY (H): ICD-10-CM

## 2023-09-07 DIAGNOSIS — E66.01 CLASS 3 SEVERE OBESITY DUE TO EXCESS CALORIES WITH SERIOUS COMORBIDITY AND BODY MASS INDEX (BMI) OF 50.0 TO 59.9 IN ADULT (H): Primary | ICD-10-CM

## 2023-09-07 DIAGNOSIS — E11.9 TYPE 2 DIABETES MELLITUS WITHOUT COMPLICATION, WITHOUT LONG-TERM CURRENT USE OF INSULIN (H): ICD-10-CM

## 2023-09-07 DIAGNOSIS — E66.813 CLASS 3 SEVERE OBESITY DUE TO EXCESS CALORIES WITH SERIOUS COMORBIDITY AND BODY MASS INDEX (BMI) OF 50.0 TO 59.9 IN ADULT (H): Primary | ICD-10-CM

## 2023-09-07 DIAGNOSIS — Z71.3 NUTRITIONAL COUNSELING: ICD-10-CM

## 2023-09-07 DIAGNOSIS — Z86.19 HISTORY OF HELICOBACTER PYLORI INFECTION: ICD-10-CM

## 2023-09-07 DIAGNOSIS — E55.9 VITAMIN D DEFICIENCY: ICD-10-CM

## 2023-09-07 PROCEDURE — 99442 PR PHYSICIAN TELEPHONE EVALUATION 11-20 MIN: CPT | Mod: 95 | Performed by: NURSE PRACTITIONER

## 2023-09-07 RX ORDER — MULTIPLE VITAMINS W/ MINERALS TAB 9MG-400MCG
1 TAB ORAL 2 TIMES DAILY
Qty: 180 TABLET | Refills: 3 | Status: SHIPPED | OUTPATIENT
Start: 2023-09-07 | End: 2024-01-12

## 2023-09-07 RX ORDER — CYANOCOBALAMIN 1000 UG/ML
1 INJECTION, SOLUTION INTRAMUSCULAR; SUBCUTANEOUS
Qty: 1 ML | Refills: 3 | Status: SHIPPED | OUTPATIENT
Start: 2023-09-07 | End: 2023-12-18

## 2023-09-07 RX ORDER — LIRAGLUTIDE 6 MG/ML
1.8 INJECTION SUBCUTANEOUS DAILY
Qty: 9 ML | Refills: 3 | Status: SHIPPED | OUTPATIENT
Start: 2023-09-07 | End: 2023-12-18

## 2023-09-07 RX ORDER — NEEDLES, SAFETY 18GX1 1/2"
1 NEEDLE, DISPOSABLE MISCELLANEOUS
Qty: 1 EACH | Refills: 3 | Status: SHIPPED | OUTPATIENT
Start: 2023-09-07 | End: 2024-01-12

## 2023-09-07 RX ORDER — TOPIRAMATE 25 MG/1
TABLET, FILM COATED ORAL
Qty: 90 TABLET | Refills: 1 | Status: SHIPPED | OUTPATIENT
Start: 2023-09-07 | End: 2024-01-12

## 2023-09-07 NOTE — LETTER
2023       RE: Babita Rivas  [address]  [facility] [address]     Dear Colleague,    Thank you for referring your patient, Babita Rivas, to the [facility] [place] at [facility]. Please see a copy of my visit note below.    Virtual Visit Details    Type of service:  Telephone Visit   Phone call duration: 15 minutes     Return Bariatric Surgery Note    RE: Babita Rivas  MR#: [id number]  : 1985  VISIT DATE: Sep 7, 2023      Dear Elizabeth Pascual,    I had the pleasure of seeing your patient, Babita Rivas, in my post-bariatric surgery assessment clinic.    Assessment & Plan   Problem List Items Addressed This Visit          Digestive    Class 3 severe obesity due to excess calories with serious comorbidity and body mass index (BMI) of 50.0 to 59.9 in adult (H) - Primary     Stress this past year has triggered her to stop all her vitamins and medications after bariatric surgery and for diabetes. She feels things are more manageable and wants to get back on track now. Vitamins and protein shakes have been cost prohibitive. Will see if vitamins covered by insurance. Discussed [employer] as cheaper options for protein.       Restart victoza: 0.6mg x 1 week, 1.2mg x 1 week, 1.8mg therafter   Restart topiramate: 25mg (1tab) x 1 week, 50mg (2tab) x 1 week, then 75mg (3tab) therafter   Restart multivitamin, calcium, b12   Restart 1protein shake daily   Continue 64oz water daily   Follow up with dietitian   Follow up with me in 3 months   Labs due in 3 months         Relevant Medications    liraglutide (VICTOZA) 18 MG/3ML solution    topiramate (TOPAMAX) 25 MG tablet    multivitamin w/minerals (MULTIVITAMINS W/MINERALS) tablet    Other Relevant Orders    CBC with platelets    Comprehensive metabolic panel    Hemoglobin A1c    Parathyroid Hormone Intact    Vitamin A    Vitamin B12    Vitamin D Deficiency        "Endocrine    Diabetes mellitus, type 2 (H)    Relevant Medications    cyanocobalamin (CYANOCOBALAMIN) 1000 MCG/ML injection    insulin pen needle (31G X 5 MM) 31G X 5 MM miscellaneous    liraglutide (VICTOZA) 18 MG/3ML solution    multivitamin w/minerals (MULTIVITAMINS W/MINERALS) tablet    Other Relevant Orders    CBC with platelets    Comprehensive metabolic panel    Hemoglobin A1c    Parathyroid Hormone Intact    Vitamin A    Vitamin B12    Vitamin D Deficiency       Other    S/P laparoscopic sleeve gastrectomy    Relevant Medications    cyanocobalamin (CYANOCOBALAMIN) 1000 MCG/ML injection    Syringe/Needle, Disp, (BD ECLIPSE SYRINGE/NEEDLE) 25G X 5/8\" 3 ML MISC    Calcium Citrate-Vitamin D 500-10 MG-MCG CHEW    multivitamin w/minerals (MULTIVITAMINS W/MINERALS) tablet    Other Relevant Orders    CBC with platelets    Comprehensive metabolic panel    Hemoglobin A1c    Parathyroid Hormone Intact    Vitamin A    Vitamin B12    Vitamin D Deficiency    Helicobacter pylori Antigen Stool     Other Visit Diagnoses       Morbid obesity (H)        Relevant Medications    cyanocobalamin (CYANOCOBALAMIN) 1000 MCG/ML injection    insulin pen needle (31G X 5 MM) 31G X 5 MM miscellaneous    liraglutide (VICTOZA) 18 MG/3ML solution    multivitamin w/minerals (MULTIVITAMINS W/MINERALS) tablet    Other Relevant Orders    CBC with platelets    Comprehensive metabolic panel    Hemoglobin A1c    Parathyroid Hormone Intact    Vitamin A    Vitamin B12    Vitamin D Deficiency    Nutritional counseling        Relevant Medications    cyanocobalamin (CYANOCOBALAMIN) 1000 MCG/ML injection    multivitamin w/minerals (MULTIVITAMINS W/MINERALS) tablet    Other Relevant Orders    CBC with platelets    Comprehensive metabolic panel    Hemoglobin A1c    Parathyroid Hormone Intact    Vitamin A    Vitamin B12    Vitamin D Deficiency    Vitamin D deficiency        Relevant Medications    cyanocobalamin (CYANOCOBALAMIN) 1000 MCG/ML injection    " "multivitamin w/minerals (MULTIVITAMINS W/MINERALS) tablet    Other Relevant Orders    CBC with platelets    Comprehensive metabolic panel    Hemoglobin A1c    Parathyroid Hormone Intact    Vitamin A    Vitamin B12    Vitamin D Deficiency    History of Helicobacter pylori infection        Relevant Orders    Helicobacter pylori Antigen Stool               33 minutes spent by me on the date of the encounter doing chart review, history and exam, documentation and further activities per the note    CHIEF COMPLAINT: Post-bariatric surgery follow-up. 1 years s/p sleeve with Dr. Fitzpatrick. 3/2022    HISTORY OF PRESENT ILLNESS:      5/2/2023    11:01 AM   Questions Regarding Prior Weight Loss Surgery Reviewed With Patient   I had the following weight loss procedure Sleeve Gastrectomy   What year was your surgery? 2022   How has your weight changed since your last visit? I have stayed about the same   Do you currently have any of the following None of the above   Do you have any concerns today? I don t feel like I m losing Weight anymore      NBS 2/2021 high weight (290lb BMI 49.7) s/p sleeve 3/2022 with history of DMII and asthma. Restarted victoza post op. Last seen 5/2023 - noticing more snacking, discussed health psych and topiramate     Since last seen she has \"fallen off\" everything- really busy taking care of others     Anti-obesity medications:     Current:   victoza 1.8mg   topiramate     Recent diet changes:     -Protein- needs improvement   -Water- adequate     Recent exercise/activity changes:   Very active at work   Working more with daughter     Recent stressors:   Really busy, lots of stress, out of routine   Finances became difficult     Vitamins/Labs:   Due for H.pylori confirmation of treatment test   [facility] 1/2023     Weight History:      5/2/2023    11:01 AM   --   What is your highest lifetime weight? 295   What is your lowest weight since surgery? (In pounds) 238.8     Initial Weight (lbs): 290 " "lbs  Weight:  (no new ht/wght)  Last Visits Weight: 109.8 kg (242 lb)              5/2/2023    11:01 AM   Questions Regarding Co-Morbidities and Health Concerns Reviewed With Patient   Pre-diabetes Never   Diabetes II Gone away   High Blood Pressure Never   High cholesterol Never   Heartburn/Reflux Never   Sleep apnea Never   PCOS Never   Back pain Never   Joint pain Never   Lower leg swelling Improved           5/2/2023    11:01 AM   Eating Habits   How many meals do you eat per day? 2   Do you snack between meals? No   How much food are you eating at each meal? 1/2 cup to 1 cup   Are you able to separate your meals and liquids by at least 30 minutes? Sometimes   Are you able to avoid liquid calories? Yes           5/2/2023    11:01 AM   Exercise Questions Reviewed With Patient   How often do you exercise? 1 to 2 times per week   What is the duration of your exercise (in minutes)? 15 Minutes   What types of exercise do you do? walking   What keeps you from being more active? I am as active as I can possbily be       Social History:      5/2/2023    11:01 AM   --   Are you smoking? No   Are you drinking alcohol? Yes   How much alcohol? Special days only       Medications:  Current Outpatient Medications   Medication    Calcium Citrate-Vitamin D 500-10 MG-MCG CHEW    cyanocobalamin (CYANOCOBALAMIN) 1000 MCG/ML injection    insulin pen needle (31G X 5 MM) 31G X 5 MM miscellaneous    liraglutide (VICTOZA) 18 MG/3ML solution    multivitamin w/minerals (MULTIVITAMINS W/MINERALS) tablet    Syringe/Needle, Disp, (BD ECLIPSE SYRINGE/NEEDLE) 25G X 5/8\" 3 ML MISC    topiramate (TOPAMAX) 25 MG tablet    acetaminophen (TYLENOL) 325 MG tablet    hydrOXYzine (ATARAX) 10 MG tablet    sertraline (ZOLOFT) 25 MG tablet     No current facility-administered medications for this visit.         5/2/2023    11:01 AM   --   Do you avoid NSAIDs such as (Ibuprofen, Aleve, Naproxen, Advil)? Yes       ROS:  GI:       5/2/2023    11:01 AM   -- "   Vomiting No   Diarrhea No   Constipation No   Swallowing trouble No   Abdominal pain No   Heartburn No     Skin:       5/2/2023    11:01 AM   BAR RBS ROS - SKIN   Rash in skin folds No     Psych:       5/2/2023    11:01 AM   --   Depression No   Anxiety No     Female Only:       5/2/2023    11:01 AM   BAR RBS ROS -    Female only Regular menstrual cycles   Stress urinary incontinence No       Lab on 01/10/2023   Component Date Value Ref Range Status    Vitamin D, Total (25-Hydroxy) 01/10/2023 20  20 - 75 ug/L Final    Vitamin B12 01/10/2023 1,356 (H)  232 - 1,245 pg/mL Final    Ferritin 01/10/2023 164 (H)  12 - 150 ng/mL Final    Parathyroid Hormone Intact 01/10/2023 34  15 - 65 pg/mL Final    Vitamin A 01/10/2023 0.74  0.30 - 1.20 mg/L Final    Retinol Palmitate 01/10/2023 0.05  0.00 - 0.10 mg/L Final    Vitamin A Interp 01/10/2023 Normal   Final      This test was developed and its performance characteristics   determined by [facility]. It has not been cleared or   approved by the US Food and Drug Administration. This test   was performed in a CLIA certified laboratory and is   intended for clinical purposes.  Performed By: [facility]  [address] [place] [id number]  : Jeffrey Fagan MD, PhD    Sodium 01/10/2023 137  133 - 144 mmol/L Final    Potassium 01/10/2023 3.8  3.4 - 5.3 mmol/L Final    Chloride 01/10/2023 106  94 - 109 mmol/L Final    Carbon Dioxide (CO2) 01/10/2023 25  20 - 32 mmol/L Final    Anion Gap 01/10/2023 6  3 - 14 mmol/L Final    Urea Nitrogen 01/10/2023 12  7 - 30 mg/dL Final    Creatinine 01/10/2023 0.76  0.52 - 1.04 mg/dL Final    Calcium 01/10/2023 9.0  8.5 - 10.1 mg/dL Final    Glucose 01/10/2023 89  70 - 99 mg/dL Final    Alkaline Phosphatase 01/10/2023 69  40 - 150 U/L Final    AST 01/10/2023 13  0 - 45 U/L Final    ALT 01/10/2023 23  0 - 50 U/L Final    Protein Total 01/10/2023 7.6  6.8 - 8.8 g/dL Final    Albumin 01/10/2023 3.9  3.4 -  5.0 g/dL Final    Bilirubin Total 01/10/2023 1.0  0.2 - 1.3 mg/dL Final    GFR Estimate 01/10/2023 >90  >60 mL/min/1.73m2 Final    Effective December 21, 2021 eGFRcr in adults is calculated using the 2021 CKD-EPI creatinine equation which includes age and gender (Edward et al., NE, DOI: 10.1056/HWSWxx4652900)    WBC Count 01/10/2023 9.9  4.0 - 11.0 10e3/uL Final    RBC Count 01/10/2023 4.87  3.80 - 5.20 10e6/uL Final    Hemoglobin 01/10/2023 14.4  11.7 - 15.7 g/dL Final    Hematocrit 01/10/2023 43.8  35.0 - 47.0 % Final    MCV 01/10/2023 90  78 - 100 fL Final    MCH 01/10/2023 29.6  26.5 - 33.0 pg Final    MCHC 01/10/2023 32.9  31.5 - 36.5 g/dL Final    RDW 01/10/2023 12.2  10.0 - 15.0 % Final    Platelet Count 01/10/2023 290  150 - 450 10e3/uL Final              No data to display                  PHYSICAL EXAM:  Objective    There were no vitals taken for this visit.         Vitals:  No vitals were obtained today due to virtual visit.    Physical Exam   GENERAL: Healthy, alert and no distress  EYES: Eyes grossly normal to inspection.  No discharge or erythema, or obvious scleral/conjunctival abnormalities.  RESP: No audible wheeze, cough, or visible cyanosis.  No visible retractions or increased work of breathing.    SKIN: Visible skin clear. No significant rash, abnormal pigmentation or lesions.  NEURO: Cranial nerves grossly intact.  Mentation and speech appropriate for age.  PSYCH: Mentation appears normal, affect normal/bright, judgement and insight intact, normal speech and appearance well-groomed.        Sincerely,    Arlette Corbett NP

## 2023-09-07 NOTE — ASSESSMENT & PLAN NOTE
Stress this past year has triggered her to stop all her vitamins and medications after bariatric surgery and for diabetes. She feels things are more manageable and wants to get back on track now. Vitamins and protein shakes have been cost prohibitive. Will see if vitamins covered by insurance. Discussed [employer] as cheaper options for protein.       Restart victoza: 0.6mg x 1 week, 1.2mg x 1 week, 1.8mg therafter   Restart topiramate: 25mg (1tab) x 1 week, 50mg (2tab) x 1 week, then 75mg (3tab) therafter   Restart multivitamin, calcium, b12   Restart 1protein shake daily   Continue 64oz water daily   Follow up with dietitian   Follow up with me in 3 months   Labs due in 3 months

## 2023-09-07 NOTE — NURSING NOTE
Is the patient currently in the state of [place]? YES    Visit mode:TELEPHONE    If the visit is dropped, the patient can be reconnected by: TELEPHONE VISIT: Phone number:   Telephone Information:   Mobile [phone]       Will anyone else be joining the visit? NO  (If patient encounters technical issues they should call [phone] :[phone])    How would you like to obtain your AVS? MyChart    Are changes needed to the allergy or medication list? No    Reason for visit: RECHECK    [facility]    Unable to complete QNRs with patient due to late answer.

## 2023-09-07 NOTE — PROGRESS NOTES
Virtual Visit Details    Type of service:  Telephone Visit   Phone call duration: 15 minutes     Return Bariatric Surgery Note    RE: Babita Rivas  MR#: [id number]  : 1985  VISIT DATE: Sep 7, 2023      Dear Elizabeth Pascual,    I had the pleasure of seeing your patient, Babita Rivas, in my post-bariatric surgery assessment clinic.    Assessment & Plan   Problem List Items Addressed This Visit        Digestive    Class 3 severe obesity due to excess calories with serious comorbidity and body mass index (BMI) of 50.0 to 59.9 in adult (H) - Primary     Stress this past year has triggered her to stop all her vitamins and medications after bariatric surgery and for diabetes. She feels things are more manageable and wants to get back on track now. Vitamins and protein shakes have been cost prohibitive. Will see if vitamins covered by insurance. Discussed [employer] as cheaper options for protein.       Restart victoza: 0.6mg x 1 week, 1.2mg x 1 week, 1.8mg therafter   Restart topiramate: 25mg (1tab) x 1 week, 50mg (2tab) x 1 week, then 75mg (3tab) therafter   Restart multivitamin, calcium, b12   Restart 1protein shake daily   Continue 64oz water daily   Follow up with dietitian   Follow up with me in 3 months   Labs due in 3 months         Relevant Medications    liraglutide (VICTOZA) 18 MG/3ML solution    topiramate (TOPAMAX) 25 MG tablet    multivitamin w/minerals (MULTIVITAMINS W/MINERALS) tablet    Other Relevant Orders    CBC with platelets    Comprehensive metabolic panel    Hemoglobin A1c    Parathyroid Hormone Intact    Vitamin A    Vitamin B12    Vitamin D Deficiency       Endocrine    Diabetes mellitus, type 2 (H)    Relevant Medications    cyanocobalamin (CYANOCOBALAMIN) 1000 MCG/ML injection    insulin pen needle (31G X 5 MM) 31G X 5 MM miscellaneous    liraglutide (VICTOZA) 18 MG/3ML solution    multivitamin w/minerals (MULTIVITAMINS W/MINERALS) tablet    Other Relevant Orders    CBC with platelets  "   Comprehensive metabolic panel    Hemoglobin A1c    Parathyroid Hormone Intact    Vitamin A    Vitamin B12    Vitamin D Deficiency       Other    S/P laparoscopic sleeve gastrectomy    Relevant Medications    cyanocobalamin (CYANOCOBALAMIN) 1000 MCG/ML injection    Syringe/Needle, Disp, (BD ECLIPSE SYRINGE/NEEDLE) 25G X 5/8\" 3 ML MISC    Calcium Citrate-Vitamin D 500-10 MG-MCG CHEW    multivitamin w/minerals (MULTIVITAMINS W/MINERALS) tablet    Other Relevant Orders    CBC with platelets    Comprehensive metabolic panel    Hemoglobin A1c    Parathyroid Hormone Intact    Vitamin A    Vitamin B12    Vitamin D Deficiency    Helicobacter pylori Antigen Stool   Other Visit Diagnoses     Morbid obesity (H)        Relevant Medications    cyanocobalamin (CYANOCOBALAMIN) 1000 MCG/ML injection    insulin pen needle (31G X 5 MM) 31G X 5 MM miscellaneous    liraglutide (VICTOZA) 18 MG/3ML solution    multivitamin w/minerals (MULTIVITAMINS W/MINERALS) tablet    Other Relevant Orders    CBC with platelets    Comprehensive metabolic panel    Hemoglobin A1c    Parathyroid Hormone Intact    Vitamin A    Vitamin B12    Vitamin D Deficiency    Nutritional counseling        Relevant Medications    cyanocobalamin (CYANOCOBALAMIN) 1000 MCG/ML injection    multivitamin w/minerals (MULTIVITAMINS W/MINERALS) tablet    Other Relevant Orders    CBC with platelets    Comprehensive metabolic panel    Hemoglobin A1c    Parathyroid Hormone Intact    Vitamin A    Vitamin B12    Vitamin D Deficiency    Vitamin D deficiency        Relevant Medications    cyanocobalamin (CYANOCOBALAMIN) 1000 MCG/ML injection    multivitamin w/minerals (MULTIVITAMINS W/MINERALS) tablet    Other Relevant Orders    CBC with platelets    Comprehensive metabolic panel    Hemoglobin A1c    Parathyroid Hormone Intact    Vitamin A    Vitamin B12    Vitamin D Deficiency    History of Helicobacter pylori infection        Relevant Orders    Helicobacter pylori Antigen Stool " "            33 minutes spent by me on the date of the encounter doing chart review, history and exam, documentation and further activities per the note    CHIEF COMPLAINT: Post-bariatric surgery follow-up. 1 years s/p sleeve with Dr. Fitzpatrick. 3/2022    HISTORY OF PRESENT ILLNESS:      5/2/2023    11:01 AM   Questions Regarding Prior Weight Loss Surgery Reviewed With Patient   I had the following weight loss procedure Sleeve Gastrectomy   What year was your surgery? 2022   How has your weight changed since your last visit? I have stayed about the same   Do you currently have any of the following None of the above   Do you have any concerns today? I don t feel like I m losing Weight anymore      NBS 2/2021 high weight (290lb BMI 49.7) s/p sleeve 3/2022 with history of DMII and asthma. Restarted victoza post op. Last seen 5/2023 - noticing more snacking, discussed health psych and topiramate     Since last seen she has \"fallen off\" everything- really busy taking care of others     Anti-obesity medications:     Current:   victoza 1.8mg   topiramate     Recent diet changes:     -Protein- needs improvement   -Water- adequate     Recent exercise/activity changes:   Very active at work   Working more with daughter     Recent stressors:   Really busy, lots of stress, out of routine   Finances became difficult     Vitamins/Labs:   Due for H.pylori confirmation of treatment test   [facility] labs 1/2023     Weight History:      5/2/2023    11:01 AM   --   What is your highest lifetime weight? 295   What is your lowest weight since surgery? (In pounds) 238.8     Initial Weight (lbs): 290 lbs  Weight:  (no new ht/wght)  Last Visits Weight: 109.8 kg (242 lb)              5/2/2023    11:01 AM   Questions Regarding Co-Morbidities and Health Concerns Reviewed With Patient   Pre-diabetes Never   Diabetes II Gone away   High Blood Pressure Never   High cholesterol Never   Heartburn/Reflux Never   Sleep apnea Never   PCOS Never   Back pain " "Never   Joint pain Never   Lower leg swelling Improved           5/2/2023    11:01 AM   Eating Habits   How many meals do you eat per day? 2   Do you snack between meals? No   How much food are you eating at each meal? 1/2 cup to 1 cup   Are you able to separate your meals and liquids by at least 30 minutes? Sometimes   Are you able to avoid liquid calories? Yes           5/2/2023    11:01 AM   Exercise Questions Reviewed With Patient   How often do you exercise? 1 to 2 times per week   What is the duration of your exercise (in minutes)? 15 Minutes   What types of exercise do you do? walking   What keeps you from being more active? I am as active as I can possbily be       Social History:      5/2/2023    11:01 AM   --   Are you smoking? No   Are you drinking alcohol? Yes   How much alcohol? Special days only       Medications:  Current Outpatient Medications   Medication     Calcium Citrate-Vitamin D 500-10 MG-MCG CHEW     cyanocobalamin (CYANOCOBALAMIN) 1000 MCG/ML injection     insulin pen needle (31G X 5 MM) 31G X 5 MM miscellaneous     liraglutide (VICTOZA) 18 MG/3ML solution     multivitamin w/minerals (MULTIVITAMINS W/MINERALS) tablet     Syringe/Needle, Disp, (BD ECLIPSE SYRINGE/NEEDLE) 25G X 5/8\" 3 ML MISC     topiramate (TOPAMAX) 25 MG tablet     acetaminophen (TYLENOL) 325 MG tablet     hydrOXYzine (ATARAX) 10 MG tablet     sertraline (ZOLOFT) 25 MG tablet     No current facility-administered medications for this visit.         5/2/2023    11:01 AM   --   Do you avoid NSAIDs such as (Ibuprofen, Aleve, Naproxen, Advil)? Yes       ROS:  GI:       5/2/2023    11:01 AM   --   Vomiting No   Diarrhea No   Constipation No   Swallowing trouble No   Abdominal pain No   Heartburn No     Skin:       5/2/2023    11:01 AM   HERVE PEREZ ROS - SKIN   Rash in skin folds No     Psych:       5/2/2023    11:01 AM   --   Depression No   Anxiety No     Female Only:       5/2/2023    11:01 AM   HERVE PEREZ ROS -    Female only " Regular menstrual cycles   Stress urinary incontinence No       Lab on 01/10/2023   Component Date Value Ref Range Status     Vitamin D, Total (25-Hydroxy) 01/10/2023 20  20 - 75 ug/L Final     Vitamin B12 01/10/2023 1,356 (H)  232 - 1,245 pg/mL Final     Ferritin 01/10/2023 164 (H)  12 - 150 ng/mL Final     Parathyroid Hormone Intact 01/10/2023 34  15 - 65 pg/mL Final     Vitamin A 01/10/2023 0.74  0.30 - 1.20 mg/L Final     Retinol Palmitate 01/10/2023 0.05  0.00 - 0.10 mg/L Final     Vitamin A Interp 01/10/2023 Normal   Final      This test was developed and its performance characteristics   determined by [facility]. It has not been cleared or   approved by the US Food and Drug Administration. This test   was performed in a CLIA certified laboratory and is   intended for clinical purposes.  Performed By: [facility]  [address] [place] [id number]  : Jeffrey Fagan MD, PhD     Sodium 01/10/2023 137  133 - 144 mmol/L Final     Potassium 01/10/2023 3.8  3.4 - 5.3 mmol/L Final     Chloride 01/10/2023 106  94 - 109 mmol/L Final     Carbon Dioxide (CO2) 01/10/2023 25  20 - 32 mmol/L Final     Anion Gap 01/10/2023 6  3 - 14 mmol/L Final     Urea Nitrogen 01/10/2023 12  7 - 30 mg/dL Final     Creatinine 01/10/2023 0.76  0.52 - 1.04 mg/dL Final     Calcium 01/10/2023 9.0  8.5 - 10.1 mg/dL Final     Glucose 01/10/2023 89  70 - 99 mg/dL Final     Alkaline Phosphatase 01/10/2023 69  40 - 150 U/L Final     AST 01/10/2023 13  0 - 45 U/L Final     ALT 01/10/2023 23  0 - 50 U/L Final     Protein Total 01/10/2023 7.6  6.8 - 8.8 g/dL Final     Albumin 01/10/2023 3.9  3.4 - 5.0 g/dL Final     Bilirubin Total 01/10/2023 1.0  0.2 - 1.3 mg/dL Final     GFR Estimate 01/10/2023 >90  >60 mL/min/1.73m2 Final    Effective December 21, 2021 eGFRcr in adults is calculated using the 2021 CKD-EPI creatinine equation which includes age and gender (Edward et al., NEJM, DOI: 10.1056/QPOKvh8553206)      WBC Count 01/10/2023 9.9  4.0 - 11.0 10e3/uL Final     RBC Count 01/10/2023 4.87  3.80 - 5.20 10e6/uL Final     Hemoglobin 01/10/2023 14.4  11.7 - 15.7 g/dL Final     Hematocrit 01/10/2023 43.8  35.0 - 47.0 % Final     MCV 01/10/2023 90  78 - 100 fL Final     MCH 01/10/2023 29.6  26.5 - 33.0 pg Final     MCHC 01/10/2023 32.9  31.5 - 36.5 g/dL Final     RDW 01/10/2023 12.2  10.0 - 15.0 % Final     Platelet Count 01/10/2023 290  150 - 450 10e3/uL Final              No data to display                  PHYSICAL EXAM:  Objective    There were no vitals taken for this visit.         Vitals:  No vitals were obtained today due to virtual visit.    Physical Exam   GENERAL: Healthy, alert and no distress  EYES: Eyes grossly normal to inspection.  No discharge or erythema, or obvious scleral/conjunctival abnormalities.  RESP: No audible wheeze, cough, or visible cyanosis.  No visible retractions or increased work of breathing.    SKIN: Visible skin clear. No significant rash, abnormal pigmentation or lesions.  NEURO: Cranial nerves grossly intact.  Mentation and speech appropriate for age.  PSYCH: Mentation appears normal, affect normal/bright, judgement and insight intact, normal speech and appearance well-groomed.        Sincerely,    Arlette Corbett NP

## 2023-09-07 NOTE — PATIENT INSTRUCTIONS
"Aaron Jc, it was nice to meet you today!  Thank you for allowing us the privilege of caring for you. We hope we provided you with the excellent service you deserve.   Please let us know if there is anything else we can do for you so that we can be sure you are completely satisfied with your care experience.    To ensure the quality of our services you may be receiving a patient satisfaction survey from an independent patient satisfaction monitoring company.    The greatest compliment you can give is a \"Likely to Recommend\"    Your visit was with Arlette Corbett NP today.    Instructions per today's visit:     Follow up  plan:  Restart victoza: 0.6mg x 1 week, 1.2mg x 1 week, 1.8mg therafter   Restart topiramate: 25mg (1tab) x 1 week, 50mg (2tab) x 1 week, then 75mg (3tab) therafter   Restart multivitamin, calcium, b12   Restart 1protein shake daily   Continue 64oz water daily   Follow up with dietitian   Follow up with me in 3 months   Labs due in 3 months   ___________________________________________________________________________  Important contact and scheduling information:  Please call our contact center at [phone] to schedule your next appointments.  For any nursing questions or concerns call Carmina Cavazos LPN at [phone] or Sandrine Salazar RN at [phone]  Please call during clinic hours Monday through Friday 8:00a - 4:00p if you have questions or you can contact us via Yhat at anytime and we will reply during clinic hours.    Lab results will be communicated through My Chart or letter (if My Chart not used). Please call the clinic if you have not received communication after 1 week or if you have any questions.?  Clinic Fax: [phone]  __________________________________________________________________________    If labs were ordered today:    Please make an appointment to have them drawn at your convenience.     To schedule the Lab Appointment using Yhat:  Select \"Schedule an " "Appointment\"  Select \"Lab Only\"  For \"A couple of questions\", select \"Other\"  For \"Which locations work for you?, select the location and set up the appointment    To schedule by phone call [phone] to schedule a lab only appointment at any [facility] lab.  ___________________________________________________________________________  Work with A Health !  Virtual Sessions are Available through [facility] Weight Management Clinics    To learn more, call to schedule a free, Health  Q&A appointment: [phone]     What is Health Coaching?  Do you know what you are supposed to do, but you just aren't doing it?  Then, HEALTH COACHING may help you!   Get unstuck and move forward with the support of a professionally trained NBC-HWC (National Board-Certified Health and ) who uses evidence-based approaches to help you move forward with healthy lifestyle changes in the areas of weight loss, stress management and overall well-being.    Health Coaches help you identify goals that will work best for you. Health Coaches provide support and encouragement with overcoming barriers and help you to find inspiration and motivation to lead a healthy lifestyle.    Option one:  Health Coaching 3-Pack; Three, 30-minute Health Coaching Visits, for $99  Visits are done virtually (phone or video)  This is a self pay service; we do not accept insurance for junior coaching.    Option two:   The 24 week Plan; 11 Health Coaching Visits, and a 7 months subscription to [employer]-- on-demand fitness, nutrition and mindfulness classes, for $499 (employee discounts may be available). Participants will also meet regularly with a weight management Medical Provider and a Registered/Licensed Dietician.  This is a self-pay service; we do not accept insurance for health coaching.    To Schedule a free Health  Q&A appointment to learn more,  call " [phone].  ____________________________________________________________________    [facility]   Healthy Lifestyle Virtual Support Group    Healthy Lifestyle Virtual Support Group?  This is 60 minutes of small group guided discussion, support and resources. All are welcome who want a healthy lifestyle.  WHEN: Starting in July 2023, this group meets the 1st Friday of the month from 12:30 PM - 1:30 PM virtually using Microsoft Teams.    FACILITATOR: Led by National Board Certified Health and , Kira Hartley [facility].   TO REGISTER: Please send an email to Kira [email or web address] to receive monthly invites to the group or if you have any questions about having a health .  Prior to the meeting, a link with directions on how to join the meeting will be sent to you.    2023 Meetings  May 19: Let's Talk  June 9: Create Your Coaching Toolkit: Learn How to  Yourself  July 7: Let's Talk  August 4: Benefits of Fiber with SCOTTY Moody  September 1: Show and Tell (share your aps, podcasts, recipes, hacks, books)  October 6 :Let's Talk  November 3: Introduction to Mindfulness   December 1: Let's Talk    If you would like bariatric surgery specific support group info please let your care team know.         Thank you,   [facility] Comprehensive Weight Management Team

## 2023-09-12 ENCOUNTER — LAB (OUTPATIENT)
Dept: LAB | Facility: CLINIC | Age: 38
End: 2023-09-12
Payer: COMMERCIAL

## 2023-09-12 DIAGNOSIS — E66.01 MORBID OBESITY (H): ICD-10-CM

## 2023-09-12 DIAGNOSIS — Z98.84 S/P LAPAROSCOPIC SLEEVE GASTRECTOMY: ICD-10-CM

## 2023-09-12 DIAGNOSIS — E11.9 TYPE 2 DIABETES MELLITUS WITHOUT COMPLICATION, WITHOUT LONG-TERM CURRENT USE OF INSULIN (H): ICD-10-CM

## 2023-09-12 DIAGNOSIS — E66.01 CLASS 3 SEVERE OBESITY DUE TO EXCESS CALORIES WITH SERIOUS COMORBIDITY AND BODY MASS INDEX (BMI) OF 50.0 TO 59.9 IN ADULT (H): ICD-10-CM

## 2023-09-12 DIAGNOSIS — Z71.3 NUTRITIONAL COUNSELING: ICD-10-CM

## 2023-09-12 DIAGNOSIS — E66.813 CLASS 3 SEVERE OBESITY DUE TO EXCESS CALORIES WITH SERIOUS COMORBIDITY AND BODY MASS INDEX (BMI) OF 50.0 TO 59.9 IN ADULT (H): ICD-10-CM

## 2023-09-12 DIAGNOSIS — E55.9 VITAMIN D DEFICIENCY: ICD-10-CM

## 2023-09-12 DIAGNOSIS — Z86.19 HISTORY OF HELICOBACTER PYLORI INFECTION: ICD-10-CM

## 2023-09-12 LAB
ALBUMIN SERPL BCG-MCNC: 4.6 G/DL (ref 3.5–5.2)
ALP SERPL-CCNC: 61 U/L (ref 35–104)
ALT SERPL W P-5'-P-CCNC: 8 U/L (ref 0–50)
ANION GAP SERPL CALCULATED.3IONS-SCNC: 13 MMOL/L (ref 7–15)
AST SERPL W P-5'-P-CCNC: 16 U/L (ref 0–45)
BILIRUB SERPL-MCNC: 0.9 MG/DL
BUN SERPL-MCNC: 11.5 MG/DL (ref 6–20)
CALCIUM SERPL-MCNC: 9.2 MG/DL (ref 8.6–10)
CHLORIDE SERPL-SCNC: 104 MMOL/L (ref 98–107)
CREAT SERPL-MCNC: 0.83 MG/DL (ref 0.51–0.95)
DEPRECATED HCO3 PLAS-SCNC: 20 MMOL/L (ref 22–29)
EGFRCR SERPLBLD CKD-EPI 2021: >90 ML/MIN/1.73M2
ERYTHROCYTE [DISTWIDTH] IN BLOOD BY AUTOMATED COUNT: 12.3 % (ref 10–15)
GLUCOSE SERPL-MCNC: 89 MG/DL (ref 70–99)
HBA1C MFR BLD: 5.7 % (ref 0–5.6)
HCT VFR BLD AUTO: 42.9 % (ref 35–47)
HGB BLD-MCNC: 14.3 G/DL (ref 11.7–15.7)
MCH RBC QN AUTO: 29.6 PG (ref 26.5–33)
MCHC RBC AUTO-ENTMCNC: 33.3 G/DL (ref 31.5–36.5)
MCV RBC AUTO: 89 FL (ref 78–100)
PLATELET # BLD AUTO: 272 10E3/UL (ref 150–450)
POTASSIUM SERPL-SCNC: 4 MMOL/L (ref 3.4–5.3)
PROT SERPL-MCNC: 7.4 G/DL (ref 6.4–8.3)
RBC # BLD AUTO: 4.83 10E6/UL (ref 3.8–5.2)
SODIUM SERPL-SCNC: 137 MMOL/L (ref 136–145)
WBC # BLD AUTO: 9.8 10E3/UL (ref 4–11)

## 2023-09-12 PROCEDURE — 85027 COMPLETE CBC AUTOMATED: CPT

## 2023-09-12 PROCEDURE — 83970 ASSAY OF PARATHORMONE: CPT

## 2023-09-12 PROCEDURE — 82607 VITAMIN B-12: CPT

## 2023-09-12 PROCEDURE — 36415 COLL VENOUS BLD VENIPUNCTURE: CPT

## 2023-09-12 PROCEDURE — 84590 ASSAY OF VITAMIN A: CPT | Mod: 90

## 2023-09-12 PROCEDURE — 83036 HEMOGLOBIN GLYCOSYLATED A1C: CPT

## 2023-09-12 PROCEDURE — 80053 COMPREHEN METABOLIC PANEL: CPT

## 2023-09-12 PROCEDURE — 99000 SPECIMEN HANDLING OFFICE-LAB: CPT

## 2023-09-12 PROCEDURE — 82306 VITAMIN D 25 HYDROXY: CPT

## 2023-09-13 LAB
DEPRECATED CALCIDIOL+CALCIFEROL SERPL-MC: 17 UG/L (ref 20–75)
PTH-INTACT SERPL-MCNC: 31 PG/ML (ref 15–65)
VIT B12 SERPL-MCNC: 959 PG/ML (ref 232–1245)

## 2023-09-16 LAB
ANNOTATION COMMENT IMP: NORMAL
RETINYL PALMITATE SERPL-MCNC: <0.02 MG/L
VIT A SERPL-MCNC: 0.78 MG/L

## 2023-10-30 DIAGNOSIS — E66.01 CLASS 3 SEVERE OBESITY DUE TO EXCESS CALORIES WITH SERIOUS COMORBIDITY AND BODY MASS INDEX (BMI) OF 50.0 TO 59.9 IN ADULT (H): ICD-10-CM

## 2023-10-30 DIAGNOSIS — E66.813 CLASS 3 SEVERE OBESITY DUE TO EXCESS CALORIES WITH SERIOUS COMORBIDITY AND BODY MASS INDEX (BMI) OF 50.0 TO 59.9 IN ADULT (H): ICD-10-CM

## 2023-10-30 RX ORDER — TOPIRAMATE 25 MG/1
TABLET, FILM COATED ORAL
Qty: 90 TABLET | Refills: 1 | OUTPATIENT
Start: 2023-10-30

## 2023-10-30 NOTE — TELEPHONE ENCOUNTER
Refill denied at this time. Patient needs appointment with Arlette Corbett CNP. [employer] message sent to patient to schedule appointment.

## 2023-12-12 ENCOUNTER — MYC REFILL (OUTPATIENT)
Dept: ENDOCRINOLOGY | Facility: CLINIC | Age: 38
End: 2023-12-12
Payer: COMMERCIAL

## 2023-12-12 DIAGNOSIS — E66.01 MORBID OBESITY (H): ICD-10-CM

## 2023-12-12 DIAGNOSIS — Z98.84 S/P LAPAROSCOPIC SLEEVE GASTRECTOMY: ICD-10-CM

## 2023-12-12 DIAGNOSIS — E66.813 CLASS 3 SEVERE OBESITY DUE TO EXCESS CALORIES WITH SERIOUS COMORBIDITY AND BODY MASS INDEX (BMI) OF 50.0 TO 59.9 IN ADULT (H): ICD-10-CM

## 2023-12-12 DIAGNOSIS — E55.9 VITAMIN D DEFICIENCY: ICD-10-CM

## 2023-12-12 DIAGNOSIS — Z71.3 NUTRITIONAL COUNSELING: ICD-10-CM

## 2023-12-12 DIAGNOSIS — E66.01 CLASS 3 SEVERE OBESITY DUE TO EXCESS CALORIES WITH SERIOUS COMORBIDITY AND BODY MASS INDEX (BMI) OF 50.0 TO 59.9 IN ADULT (H): ICD-10-CM

## 2023-12-12 DIAGNOSIS — E11.9 TYPE 2 DIABETES MELLITUS WITHOUT COMPLICATION, WITHOUT LONG-TERM CURRENT USE OF INSULIN (H): ICD-10-CM

## 2023-12-12 RX ORDER — TOPIRAMATE 25 MG/1
TABLET, FILM COATED ORAL
Qty: 90 TABLET | Refills: 1 | Status: CANCELLED | OUTPATIENT
Start: 2023-12-12

## 2023-12-12 RX ORDER — MULTIPLE VITAMINS W/ MINERALS TAB 9MG-400MCG
1 TAB ORAL 2 TIMES DAILY
Qty: 180 TABLET | Refills: 3 | Status: CANCELLED | OUTPATIENT
Start: 2023-12-12

## 2023-12-12 RX ORDER — CYANOCOBALAMIN 1000 UG/ML
1 INJECTION, SOLUTION INTRAMUSCULAR; SUBCUTANEOUS
Qty: 1 ML | Refills: 3 | Status: CANCELLED | OUTPATIENT
Start: 2023-12-12

## 2023-12-12 RX ORDER — NEEDLES, SAFETY 18GX1 1/2"
1 NEEDLE, DISPOSABLE MISCELLANEOUS
Qty: 1 EACH | Refills: 3 | Status: CANCELLED | OUTPATIENT
Start: 2023-12-12

## 2023-12-12 RX ORDER — LIRAGLUTIDE 6 MG/ML
1.8 INJECTION SUBCUTANEOUS DAILY
Qty: 9 ML | Refills: 3 | Status: CANCELLED | OUTPATIENT
Start: 2023-12-12

## 2023-12-13 NOTE — CONFIDENTIAL NOTE
Patient has refills of all medication except Topiramate. She needs to schedule appointment. She is aware and will message back once that is scheduled.

## 2023-12-15 ENCOUNTER — TELEPHONE (OUTPATIENT)
Dept: ENDOCRINOLOGY | Facility: CLINIC | Age: 38
End: 2023-12-15
Payer: COMMERCIAL

## 2023-12-15 DIAGNOSIS — E66.01 MORBID OBESITY (H): ICD-10-CM

## 2023-12-15 DIAGNOSIS — E11.9 TYPE 2 DIABETES MELLITUS WITHOUT COMPLICATION, WITHOUT LONG-TERM CURRENT USE OF INSULIN (H): ICD-10-CM

## 2023-12-15 DIAGNOSIS — Z71.3 NUTRITIONAL COUNSELING: ICD-10-CM

## 2023-12-15 DIAGNOSIS — E55.9 VITAMIN D DEFICIENCY: ICD-10-CM

## 2023-12-15 DIAGNOSIS — Z98.84 S/P LAPAROSCOPIC SLEEVE GASTRECTOMY: ICD-10-CM

## 2023-12-15 NOTE — TELEPHONE ENCOUNTER
Reason for Call:  Other prescription    Detailed comments: Patient would like a call back from a nurse regarding her medication refills     Phone Number Patient can be reached at: Cell number on file:    Telephone Information:   Mobile [phone]       Best Time: Anytime     Can we leave a detailed message on this number? YES    Call taken on 12/15/2023 at 3:32 PM by Ana Lilia Díaz

## 2023-12-18 RX ORDER — LIRAGLUTIDE 6 MG/ML
1.8 INJECTION SUBCUTANEOUS DAILY
Qty: 9 ML | Refills: 3 | Status: SHIPPED | OUTPATIENT
Start: 2023-12-18 | End: 2024-04-18

## 2023-12-18 RX ORDER — CYANOCOBALAMIN 1000 UG/ML
1 INJECTION, SOLUTION INTRAMUSCULAR; SUBCUTANEOUS
Qty: 1 ML | Refills: 3 | Status: SHIPPED | OUTPATIENT
Start: 2023-12-18 | End: 2024-01-12

## 2023-12-23 ENCOUNTER — HEALTH MAINTENANCE LETTER (OUTPATIENT)
Age: 38
End: 2023-12-23

## 2024-01-12 ENCOUNTER — VIRTUAL VISIT (OUTPATIENT)
Dept: ENDOCRINOLOGY | Facility: CLINIC | Age: 39
End: 2024-01-12
Payer: COMMERCIAL

## 2024-01-12 ENCOUNTER — TELEPHONE (OUTPATIENT)
Dept: ENDOCRINOLOGY | Facility: CLINIC | Age: 39
End: 2024-01-12

## 2024-01-12 VITALS — HEIGHT: 62 IN | BODY MASS INDEX: 46.74 KG/M2 | WEIGHT: 254 LBS

## 2024-01-12 DIAGNOSIS — Z71.3 NUTRITIONAL COUNSELING: ICD-10-CM

## 2024-01-12 DIAGNOSIS — E11.9 TYPE 2 DIABETES MELLITUS WITHOUT COMPLICATION, WITHOUT LONG-TERM CURRENT USE OF INSULIN (H): ICD-10-CM

## 2024-01-12 DIAGNOSIS — Z86.19 HISTORY OF HELICOBACTER PYLORI INFECTION: ICD-10-CM

## 2024-01-12 DIAGNOSIS — E55.9 VITAMIN D DEFICIENCY: ICD-10-CM

## 2024-01-12 DIAGNOSIS — E66.813 CLASS 3 SEVERE OBESITY DUE TO EXCESS CALORIES WITH SERIOUS COMORBIDITY AND BODY MASS INDEX (BMI) OF 50.0 TO 59.9 IN ADULT (H): Primary | ICD-10-CM

## 2024-01-12 DIAGNOSIS — E66.01 MORBID OBESITY (H): ICD-10-CM

## 2024-01-12 DIAGNOSIS — E66.01 CLASS 3 SEVERE OBESITY DUE TO EXCESS CALORIES WITH SERIOUS COMORBIDITY AND BODY MASS INDEX (BMI) OF 50.0 TO 59.9 IN ADULT (H): Primary | ICD-10-CM

## 2024-01-12 DIAGNOSIS — Z98.84 S/P LAPAROSCOPIC SLEEVE GASTRECTOMY: ICD-10-CM

## 2024-01-12 PROCEDURE — 99213 OFFICE O/P EST LOW 20 MIN: CPT | Mod: 95 | Performed by: NURSE PRACTITIONER

## 2024-01-12 RX ORDER — CYANOCOBALAMIN 1000 UG/ML
1 INJECTION, SOLUTION INTRAMUSCULAR; SUBCUTANEOUS
Qty: 3 ML | Refills: 3 | Status: SHIPPED | OUTPATIENT
Start: 2024-01-12

## 2024-01-12 RX ORDER — TOPIRAMATE 25 MG/1
TABLET, FILM COATED ORAL
Qty: 90 TABLET | Refills: 3 | Status: SHIPPED | OUTPATIENT
Start: 2024-01-12 | End: 2024-04-18

## 2024-01-12 RX ORDER — NEEDLES, SAFETY 18GX1 1/2"
1 NEEDLE, DISPOSABLE MISCELLANEOUS
Qty: 3 EACH | Refills: 3 | Status: SHIPPED | OUTPATIENT
Start: 2024-01-12

## 2024-01-12 RX ORDER — MULTIVIT-MIN/IRON FUM/FOLIC AC 7.5 MG-4
1 TABLET ORAL 2 TIMES DAILY
Qty: 180 TABLET | Refills: 3 | Status: SHIPPED | OUTPATIENT
Start: 2024-01-12

## 2024-01-12 ASSESSMENT — PAIN SCALES - GENERAL: PAINLEVEL: NO PAIN (0)

## 2024-01-12 NOTE — PATIENT INSTRUCTIONS
"Thank you for allowing us the privilege of caring for you. We hope we provided you with the excellent service you deserve.   Please let us know if there is anything else we can do for you so that we can be sure you are completely satisfied with your care experience.    To ensure the quality of our services you may be receiving a patient satisfaction survey from an independent patient satisfaction monitoring company.    The greatest compliment you can give is a \"Likely to Recommend\"    Your visit was with Arlette Corbett NP today.    Instructions per today's visit:     Aaron Rivas, it was great to visit with you today.  Here is a review of our visit.  If our clinic scheduler is not able to reach you please call [phone] to schedule your next appointments.    Push fluids   Push protein as able  Ask pharmacist about vitamins - sent to pharmacy- PA started   Continue victoza until ozempic picked up   Switch to ozempic 1mg   Restart topiramate   Practice 10min walk breaks to help with stress- play music with this           Information about Video Visits with [facility]: video visit information  _________________________________________________________________________________________________________________________________________________________  If you are asked by your clinic team to have your blood pressure checked:  [facility] do offer several locations for blood pressure checks. Please follow the below link to schedule an appointment. Scheduling an appointment at the pharmacy for a blood pressure check is now preferred.    Appointment Plus (appointment-plus.[employer])  _________________________________________________________________________________________________________________________________________________________  Important contact and scheduling information:  Please call our contact center at [phone] to schedule your next appointments.  To find a lab location near you, please call " [phone].  For any nursing questions or concerns call Carmina Cavazos LPN at [phone] or Sandrine Salazar RN at [phone]  Please call during clinic hours Monday through Friday 8:00a - 4:00p if you have questions or you can contact us via [employer] at anytime and we will reply during clinic hours.    Lab results will be communicated through My Chart or letter (if My Chart not used). Please call the clinic if you have not received communication after 1 week or if you have any questions.?  Clinic Fax: [phone]    _________________________________________________________________________________________________________________________________________________________  Meal Replacement Products:    Here is the link to our new e-store where you can purchase our meal replacement products    [facility] E-Store  [employer]/store    The one week starter kit is a great way to sample a variety of products and see what works for you.    If you want more information about the product go to: [employer]    If you are an employee or [facility] or [facility] please contact your care team for a 10% estore discount    Free Shipping for orders over $75     Benefits of meal replacements products:    Portion and calorie control  Improved nutrition  Structured eating  Simplified food choices  Avoid contact with trigger foods  _________________________________________________________________________________________________________________________________________________________  Interested in working with a health ?  Health coaches work with you to improve your overall health and wellbeing.  They look at the whole person, and may involve discussion of different areas of life, including, but not limited to the four pillars of health (sleep, exercise, nutrition, and stress management). Discuss with your care team if you would like to start working a health  .  Health Coaching-3 Pack: Schedule by calling [phone]    $99 for three health coaching visits    Visits may be done in person or via phone    Coaching is a partnership between the  and the client; Coaches do not prescribe or diagnose    Coaching helps inspire the client to reach his/her personal goals   _________________________________________________________________________________________________________________________________________________________  24 Week Healthy Lifestyle Plan:    Our mission in the 24-week Healthy Lifestyle Plan is to provide you with individualized care by giving you the tools, education and support you need to lose weight and maintain a healthy lifestyle. In your 24-week journey, you ll be supported by a dedicated weight loss team that includes registered dietitians, medical weight management providers, health coaches, and nurses -- all with special expertise in weight loss -- to help you every step of the way.     Monthly meetings with your registered dietician or medical weight management provider help to review your progress, update your care plan, and make any adjustments needed to ensure success. Between these visits, weekly and bi-weekly health  visits will help you focus on the four pillars of weight loss -- stress, sleep, nutrition, and exercise -- and how you can best adapt each to achieve sustainable weight loss results.    In addition, you will be given exclusive access to online wellbeing classes through [employer].  Your initial visit will be with a medical weight management provider who will help to understand your weight loss goals and ensure this program is the right fit for you. Please let our team know if you are interested in the 24 week plan by sending a message to your care team or calling [phone] to  schedule.  _________________________________________________________________________________________________________________________________________________________  __________  [facility] Get Moving Program  Our team of physical therapists is trained to help you understand and take control of your condition. They will perform a thorough evaluation to determine your ability for activity and develop a customized plan to fit your goals and physical ability.  Scheduling: Unsure if the Get Moving program is right for you? Discuss the program with your medical provider or diabetes educator. You can also call us at [phone] to ask questions or schedule an appointment.   [facility] Get Moving Program  ____________________________________________________________________________________________________________________________________________________________________________   [employer] Diabetes Prevention Program (DPP)  If you have prediabetes and Medicare please contact us via Ret[employer]t to learn more about the Diabetes Prevention Program (DPP)  Program Details:   [employer] offers the year-long Diabetes Prevention Program (DPP). The program helps you to make lifestyle changes that prevent or delay type 2 diabetes by supporting healthy eating, increased physical activity, stress reduction and use of coping skills.   On average, previous [facility] DPP cohorts have lost and maintained at least 5% of their starting weight throughout the program and averaged more than 150 minutes of physical activity per week.  Participants meet weekly for one-hour group sessions over sixteen weeks, every other week for the next 8 weeks, and monthly for the last six months.   A year-long maintenance program is also available for participants who complete the first year.   Location & Cost:   During the COVID-19 Public Health Emergency, the program is offered virtually. When in-person classes can resume, they  will be held at [facility].  For people with Medicare, the program is covered in full. A self-pay option will also be available for those with non-Medicare insurance plans.   ______________________________________________________________________________________________________________________________________________________________________________________________________________________________    To work with a Behavioral Health Psychologist:    Call to schedule:    Jasmeet Helton - [phone]  Obed Basurto - [phone]  Marina Dias - [phone]  Elizabeth Hobbs - [phone]   Doris Wilburn PhD (cannot accept Medicare) [phone]        Thank you,   [facility] Comprehensive Weight Management Team

## 2024-01-12 NOTE — TELEPHONE ENCOUNTER
PA Initiation    Medication: OZEMPIC (1 MG/DOSE) 4 MG/3ML SC SOPN  Insurance Company: [employer] - Phone [phone] Fax [phone]  Pharmacy Filling the Rx:    Filling Pharmacy Phone:    Filling Pharmacy Fax:    Start Date: 1/12/2024    Key: [id number]

## 2024-01-12 NOTE — PROGRESS NOTES
"Return Bariatric Surgery Note    RE: Babita Rivas  MR#: [id number]  : 1985  VISIT DATE: 2024      Dear Ankur, Elizabeth,    I had the pleasure of seeing your patient, Babita Rivas, in my post-bariatric surgery assessment clinic.    Assessment & Plan   Problem List Items Addressed This Visit        Digestive    Class 3 severe obesity due to excess calories with serious comorbidity and body mass index (BMI) of 50.0 to 59.9 in adult (H) - Primary     Feels she isn't losing weight. Basing this assumption on how her abdomen feels in her clothes. Felt her stomach got smaller faster the first time she took victoza. In review of benefits from start victoza it does seem to be helping with appetite/ hunger. Question if she is getting adequate nutrition. She'd like to try to switch to ozempic.     Lots of stress since last seen discussed role that has in weight management.     Push fluids   Push protein as able  Ask pharmacist about vitamins - sent to pharmacy- PA started   Continue victoza until ozempic picked up   Switch to ozempic 1mg   Restart topiramate   Practice 10min walk breaks to help with stress- play music with this          Relevant Medications    Semaglutide, 1 MG/DOSE, (OZEMPIC) 4 MG/3ML pen    topiramate (TOPAMAX) 25 MG tablet    multivitamin w/minerals (MULTIVITAMINS W/MINERALS) tablet       Endocrine    Diabetes mellitus, type 2 (H)    Relevant Medications    Semaglutide, 1 MG/DOSE, (OZEMPIC) 4 MG/3ML pen    cyanocobalamin (CYANOCOBALAMIN) 1000 MCG/ML injection    multivitamin w/minerals (MULTIVITAMINS W/MINERALS) tablet       Other    S/P laparoscopic sleeve gastrectomy    Relevant Medications    cyanocobalamin (CYANOCOBALAMIN) 1000 MCG/ML injection    Syringe/Needle, Disp, (BD ECLIPSE SYRINGE/NEEDLE) 25G X 5/8\" 3 ML MISC    multivitamin w/minerals (MULTIVITAMINS W/MINERALS) tablet    Calcium Citrate-Vitamin D 500-10 MG-MCG CHEW   Other Visit Diagnoses     Morbid obesity (H)        " "Relevant Medications    Semaglutide, 1 MG/DOSE, (OZEMPIC) 4 MG/3ML pen    cyanocobalamin (CYANOCOBALAMIN) 1000 MCG/ML injection    multivitamin w/minerals (MULTIVITAMINS W/MINERALS) tablet    Nutritional counseling        Relevant Medications    cyanocobalamin (CYANOCOBALAMIN) 1000 MCG/ML injection    multivitamin w/minerals (MULTIVITAMINS W/MINERALS) tablet    Vitamin D deficiency        Relevant Medications    cyanocobalamin (CYANOCOBALAMIN) 1000 MCG/ML injection    multivitamin w/minerals (MULTIVITAMINS W/MINERALS) tablet    vitamin D3 (CHOLECALCIFEROL) 1.25 MG (20328 UT) capsule    History of Helicobacter pylori infection        Relevant Orders    H PYLORI, STOOL, EIA             23 minutes spent by me on the date of the encounter doing chart review, history and exam, documentation and further activities per the note    CHIEF COMPLAINT: Post-bariatric surgery follow-up. Nearly 2 years s/p sleeve.     HISTORY OF PRESENT ILLNESS:      1/12/2024     8:23 AM   Questions Regarding Prior Weight Loss Surgery Reviewed With Patient   I had the following weight loss procedure Sleeve Gastrectomy   What year was your surgery? 2022   How has your weight changed since your last visit? I have gained weight   Do you currently have any of the following None of the above   Do you have any concerns today? no     NBS 2/2021 high weight (290lb BMI 49.7) s/p sleeve 3/2022 with history of DMII and asthma. Restarted victoza post op. 5/2023 - noticing more snacking, discussed health psych and topiramate- had \"fallen off\" bariatric recommendations. Last seen 9/2023 restarted victoza and discussed strategies to help get back on track.     Feels she isn't losing weight as she had expected to. No recently weight.     Anti-obesity medications:     Current:   victoza- up to 1.8mg after 3 week, no adverse side effects - felt more helpful the   topiramate- initially took but didn't get refil. Noticed more hunger when stopping it. No adverse " side effects.     Recent diet changes:   Rinaldi much sooner   Restarted protein shakes but not consisently   2 meals a day, limited snacking     -Protein- improving, still limited by limited funds   -Water- inadequate hydration - doesn't enjoy water, can drink more at one time     Recent exercise/activity changes: going to start going to the gym     Recent stressors: found out she was being cheated on   Trying to keep pushing forward     Vitamins/Labs:   Not taking     GERD symptoms?: none     Weight History:      1/12/2024     8:23 AM   --   What is your highest lifetime weight? 300   What is your lowest weight since surgery? (In pounds) 230     Initial Weight (lbs): 290 lbs  Weight: 115.2 kg (254 lb)     Cumulative weight loss (lbs): 36  Weight Loss Percentage: 12.41%        1/12/2024     8:23 AM   Questions Regarding Co-Morbidities and Health Concerns Reviewed With Patient   Pre-diabetes Never   Diabetes II Improved   High Blood Pressure Never   High cholesterol Never   Heartburn/Reflux Never   Sleep apnea Improved   PCOS Never   Back pain Stayed the same   Joint pain Never   Lower leg swelling Stayed the same           1/12/2024     8:23 AM   Eating Habits   How many meals do you eat per day? 2   Do you snack between meals? Sometimes   How much food are you eating at each meal? 1/2 cup to 1 cup   Are you able to separate your meals and liquids by at least 30 minutes? Yes   Are you able to avoid liquid calories? Yes           1/12/2024     8:23 AM   Exercise Questions Reviewed With Patient   How often do you exercise? Daily   What is the duration of your exercise (in minutes)? 20 Minutes   What types of exercise do you do? walking   What keeps you from being more active? I should be more active but I just have not gotten around to it       Social History:      1/12/2024     8:23 AM   --   Are you smoking? No   Are you drinking alcohol? Yes   How much alcohol? special occasions       Medications:  Current  "Outpatient Medications   Medication     Calcium Citrate-Vitamin D 500-10 MG-MCG CHEW     cyanocobalamin (CYANOCOBALAMIN) 1000 MCG/ML injection     hydrOXYzine (ATARAX) 10 MG tablet     insulin pen needle (31G X 5 MM) 31G X 5 MM miscellaneous     liraglutide (VICTOZA) 18 MG/3ML solution     multivitamin w/minerals (MULTIVITAMINS W/MINERALS) tablet     Semaglutide, 1 MG/DOSE, (OZEMPIC) 4 MG/3ML pen     Syringe/Needle, Disp, (BD ECLIPSE SYRINGE/NEEDLE) 25G X 5/8\" 3 ML MISC     topiramate (TOPAMAX) 25 MG tablet     vitamin D3 (CHOLECALCIFEROL) 1.25 MG (30935 UT) capsule     acetaminophen (TYLENOL) 325 MG tablet     sertraline (ZOLOFT) 25 MG tablet     No current facility-administered medications for this visit.         1/12/2024     8:23 AM   --   Do you avoid NSAIDs such as (Ibuprofen, Aleve, Naproxen, Advil)? Yes       ROS:  GI:       1/12/2024     8:23 AM   --   Vomiting No   Diarrhea No   Constipation No   Swallowing trouble No   Abdominal pain No   Heartburn No     Skin:       1/12/2024     8:23 AM   BAR RBS ROS - SKIN   Rash in skin folds No     Psych:       1/12/2024     8:23 AM   --   Depression Yes   Anxiety Yes     Female Only:       1/12/2024     8:23 AM   BAR RBS ROS -    Female only Regular menstrual cycles   Stress urinary incontinence Yes       Lab on 09/12/2023   Component Date Value Ref Range Status     WBC Count 09/12/2023 9.8  4.0 - 11.0 10e3/uL Final     RBC Count 09/12/2023 4.83  3.80 - 5.20 10e6/uL Final     Hemoglobin 09/12/2023 14.3  11.7 - 15.7 g/dL Final     Hematocrit 09/12/2023 42.9  35.0 - 47.0 % Final     MCV 09/12/2023 89  78 - 100 fL Final     MCH 09/12/2023 29.6  26.5 - 33.0 pg Final     MCHC 09/12/2023 33.3  31.5 - 36.5 g/dL Final     RDW 09/12/2023 12.3  10.0 - 15.0 % Final     Platelet Count 09/12/2023 272  150 - 450 10e3/uL Final     Sodium 09/12/2023 137  136 - 145 mmol/L Final     Potassium 09/12/2023 4.0  3.4 - 5.3 mmol/L Final     Chloride 09/12/2023 104  98 - 107 mmol/L Final "     Carbon Dioxide (CO2) 09/12/2023 20 (L)  22 - 29 mmol/L Final     Anion Gap 09/12/2023 13  7 - 15 mmol/L Final     Urea Nitrogen 09/12/2023 11.5  6.0 - 20.0 mg/dL Final     Creatinine 09/12/2023 0.83  0.51 - 0.95 mg/dL Final     Calcium 09/12/2023 9.2  8.6 - 10.0 mg/dL Final     Glucose 09/12/2023 89  70 - 99 mg/dL Final     Alkaline Phosphatase 09/12/2023 61  35 - 104 U/L Final     AST 09/12/2023 16  0 - 45 U/L Final    Reference intervals for this test were updated on 6/12/2023 to more accurately reflect our healthy population. There may be differences in the flagging of prior results with similar values performed with this method. Interpretation of those prior results can be made in the context of the updated reference intervals.     ALT 09/12/2023 8  0 - 50 U/L Final    Reference intervals for this test were updated on 6/12/2023 to more accurately reflect our healthy population. There may be differences in the flagging of prior results with similar values performed with this method. Interpretation of those prior results can be made in the context of the updated reference intervals.       Protein Total 09/12/2023 7.4  6.4 - 8.3 g/dL Final     Albumin 09/12/2023 4.6  3.5 - 5.2 g/dL Final     Bilirubin Total 09/12/2023 0.9  <=1.2 mg/dL Final     GFR Estimate 09/12/2023 >90  >60 mL/min/1.73m2 Final     Hemoglobin A1C 09/12/2023 5.7 (H)  0.0 - 5.6 % Final    Normal <5.7%   Prediabetes 5.7-6.4%    Diabetes 6.5% or higher     Note: Adopted from ADA consensus guidelines.     Parathyroid Hormone Intact 09/12/2023 31  15 - 65 pg/mL Final     Vitamin A 09/12/2023 0.78  0.30 - 1.20 mg/L Final     Retinol Palmitate 09/12/2023 <0.02  0.00 - 0.10 mg/L Final     Vitamin A Interp 09/12/2023 Normal   Final      This test was developed and its performance characteristics   determined by [facility]. It has not been cleared or   approved by the US Food and Drug Administration. This test   was performed in a CLIA certified  "laboratory and is   intended for clinical purposes.  Performed By: [facility]  [address] [place] [address]  : Jeffrey Fagan MD, PhD  CLIA Number: [id number]     Vitamin B12 09/12/2023 959  232 - 1,245 pg/mL Final     Vitamin D, Total (25-Hydroxy) 09/12/2023 17 (L)  20 - 75 ug/L Final              No data to display                  PHYSICAL EXAM:  Objective    Ht 1.575 m (5' 2\")   Wt 115.2 kg (254 lb)   BMI 46.46 kg/m             Physical Exam   GENERAL: Healthy, alert and no distress  EYES: Eyes grossly normal to inspection.  No discharge or erythema, or obvious scleral/conjunctival abnormalities.  RESP: No audible wheeze, cough, or visible cyanosis.  No visible retractions or increased work of breathing.    SKIN: Visible skin clear. No significant rash, abnormal pigmentation or lesions.  NEURO: Cranial nerves grossly intact.  Mentation and speech appropriate for age.  PSYCH: Mentation appears normal, affect normal/bright, judgement and insight intact, normal speech and appearance well-groomed.        Sincerely,    Arlette Corbett NP      Virtual Visit Details    Type of service:  Video Visit   Video Start Time: 0830  Video End Time:0850    Originating Location (pt. Location): Home    Distant Location (provider location):  Off-site  Platform used for Video Visit: Waqas"

## 2024-01-12 NOTE — ASSESSMENT & PLAN NOTE
Feels she isn't losing weight. Basing this assumption on how her abdomen feels in her clothes. Felt her stomach got smaller faster the first time she took victoza. In review of benefits from start victoza it does seem to be helping with appetite/ hunger. Question if she is getting adequate nutrition. She'd like to try to switch to ozempic.     Lots of stress since last seen discussed role that has in weight management.     Push fluids   Push protein as able  Ask pharmacist about vitamins - sent to pharmacy- PA started   Continue victoza until ozempic picked up   Switch to ozempic 1mg   Restart topiramate   Practice 10min walk breaks to help with stress- play music with this

## 2024-01-12 NOTE — LETTER
2024       RE: Babita Rivas  [address]  [place] [address]     Dear Colleague,    Thank you for referring your patient, Babita Rivas, to the [facility] [place] at [facility]. Please see a copy of my visit note below.    Return Bariatric Surgery Note    RE: Babita Rivas  MR#: [id number]  : 1985  VISIT DATE: 2024      Dear Elizabeth Pascual,    I had the pleasure of seeing your patient, Babita Rivas, in my post-bariatric surgery assessment clinic.    Assessment & Plan   Problem List Items Addressed This Visit          Digestive    Class 3 severe obesity due to excess calories with serious comorbidity and body mass index (BMI) of 50.0 to 59.9 in adult (H) - Primary     Feels she isn't losing weight. Basing this assumption on how her abdomen feels in her clothes. Felt her stomach got smaller faster the first time she took victoza. In review of benefits from start victoza it does seem to be helping with appetite/ hunger. Question if she is getting adequate nutrition. She'd like to try to switch to ozempic.     Lots of stress since last seen discussed role that has in weight management.     Push fluids   Push protein as able  Ask pharmacist about vitamins - sent to pharmacy- PA started   Continue victoza until ozempic picked up   Switch to ozempic 1mg   Restart topiramate   Practice 10min walk breaks to help with stress- play music with this          Relevant Medications    Semaglutide, 1 MG/DOSE, (OZEMPIC) 4 MG/3ML pen    topiramate (TOPAMAX) 25 MG tablet    multivitamin w/minerals (MULTIVITAMINS W/MINERALS) tablet       Endocrine    Diabetes mellitus, type 2 (H)    Relevant Medications    Semaglutide, 1 MG/DOSE, (OZEMPIC) 4 MG/3ML pen    cyanocobalamin (CYANOCOBALAMIN) 1000 MCG/ML injection    multivitamin w/minerals (MULTIVITAMINS W/MINERALS) tablet       Other    S/P laparoscopic  "sleeve gastrectomy    Relevant Medications    cyanocobalamin (CYANOCOBALAMIN) 1000 MCG/ML injection    Syringe/Needle, Disp, (BD ECLIPSE SYRINGE/NEEDLE) 25G X 5/8\" 3 ML MISC    multivitamin w/minerals (MULTIVITAMINS W/MINERALS) tablet    Calcium Citrate-Vitamin D 500-10 MG-MCG CHEW     Other Visit Diagnoses       Morbid obesity (H)        Relevant Medications    Semaglutide, 1 MG/DOSE, (OZEMPIC) 4 MG/3ML pen    cyanocobalamin (CYANOCOBALAMIN) 1000 MCG/ML injection    multivitamin w/minerals (MULTIVITAMINS W/MINERALS) tablet    Nutritional counseling        Relevant Medications    cyanocobalamin (CYANOCOBALAMIN) 1000 MCG/ML injection    multivitamin w/minerals (MULTIVITAMINS W/MINERALS) tablet    Vitamin D deficiency        Relevant Medications    cyanocobalamin (CYANOCOBALAMIN) 1000 MCG/ML injection    multivitamin w/minerals (MULTIVITAMINS W/MINERALS) tablet    vitamin D3 (CHOLECALCIFEROL) 1.25 MG (58545 UT) capsule    History of Helicobacter pylori infection        Relevant Orders    H PYLORI, STOOL, EIA               23 minutes spent by me on the date of the encounter doing chart review, history and exam, documentation and further activities per the note    CHIEF COMPLAINT: Post-bariatric surgery follow-up. Nearly 2 years s/p sleeve.     HISTORY OF PRESENT ILLNESS:      1/12/2024     8:23 AM   Questions Regarding Prior Weight Loss Surgery Reviewed With Patient   I had the following weight loss procedure Sleeve Gastrectomy   What year was your surgery? 2022   How has your weight changed since your last visit? I have gained weight   Do you currently have any of the following None of the above   Do you have any concerns today? no     NBS 2/2021 high weight (290lb BMI 49.7) s/p sleeve 3/2022 with history of DMII and asthma. Restarted victoza post op. 5/2023 - noticing more snacking, discussed health psych and topiramate- had \"fallen off\" bariatric recommendations. Last seen 9/2023 restarted victoza and discussed " strategies to help get back on track.     Feels she isn't losing weight as she had expected to. No recently weight.     Anti-obesity medications:     Current:   victoza- up to 1.8mg after 3 week, no adverse side effects - felt more helpful the   topiramate- initially took but didn't get refil. Noticed more hunger when stopping it. No adverse side effects.     Recent diet changes:   Rinaldi much sooner   Restarted protein shakes but not consisently   2 meals a day, limited snacking     -Protein- improving, still limited by limited funds   -Water- inadequate hydration - doesn't enjoy water, can drink more at one time     Recent exercise/activity changes: going to start going to the gym     Recent stressors: found out she was being cheated on   Trying to keep pushing forward     Vitamins/Labs:   Not taking     GERD symptoms?: none     Weight History:      1/12/2024     8:23 AM   --   What is your highest lifetime weight? 300   What is your lowest weight since surgery? (In pounds) 230     Initial Weight (lbs): 290 lbs  Weight: 115.2 kg (254 lb)     Cumulative weight loss (lbs): 36  Weight Loss Percentage: 12.41%        1/12/2024     8:23 AM   Questions Regarding Co-Morbidities and Health Concerns Reviewed With Patient   Pre-diabetes Never   Diabetes II Improved   High Blood Pressure Never   High cholesterol Never   Heartburn/Reflux Never   Sleep apnea Improved   PCOS Never   Back pain Stayed the same   Joint pain Never   Lower leg swelling Stayed the same           1/12/2024     8:23 AM   Eating Habits   How many meals do you eat per day? 2   Do you snack between meals? Sometimes   How much food are you eating at each meal? 1/2 cup to 1 cup   Are you able to separate your meals and liquids by at least 30 minutes? Yes   Are you able to avoid liquid calories? Yes           1/12/2024     8:23 AM   Exercise Questions Reviewed With Patient   How often do you exercise? Daily   What is the duration of your exercise (in  "minutes)? 20 Minutes   What types of exercise do you do? walking   What keeps you from being more active? I should be more active but I just have not gotten around to it       Social History:      1/12/2024     8:23 AM   --   Are you smoking? No   Are you drinking alcohol? Yes   How much alcohol? special occasions       Medications:  Current Outpatient Medications   Medication    Calcium Citrate-Vitamin D 500-10 MG-MCG CHEW    cyanocobalamin (CYANOCOBALAMIN) 1000 MCG/ML injection    hydrOXYzine (ATARAX) 10 MG tablet    insulin pen needle (31G X 5 MM) 31G X 5 MM miscellaneous    liraglutide (VICTOZA) 18 MG/3ML solution    multivitamin w/minerals (MULTIVITAMINS W/MINERALS) tablet    Semaglutide, 1 MG/DOSE, (OZEMPIC) 4 MG/3ML pen    Syringe/Needle, Disp, (BD ECLIPSE SYRINGE/NEEDLE) 25G X 5/8\" 3 ML MISC    topiramate (TOPAMAX) 25 MG tablet    vitamin D3 (CHOLECALCIFEROL) 1.25 MG (77288 UT) capsule    acetaminophen (TYLENOL) 325 MG tablet    sertraline (ZOLOFT) 25 MG tablet     No current facility-administered medications for this visit.         1/12/2024     8:23 AM   --   Do you avoid NSAIDs such as (Ibuprofen, Aleve, Naproxen, Advil)? Yes       ROS:  GI:       1/12/2024     8:23 AM   --   Vomiting No   Diarrhea No   Constipation No   Swallowing trouble No   Abdominal pain No   Heartburn No     Skin:       1/12/2024     8:23 AM   BAR RBS ROS - SKIN   Rash in skin folds No     Psych:       1/12/2024     8:23 AM   --   Depression Yes   Anxiety Yes     Female Only:       1/12/2024     8:23 AM   BAR RBS ROS -    Female only Regular menstrual cycles   Stress urinary incontinence Yes       Lab on 09/12/2023   Component Date Value Ref Range Status    WBC Count 09/12/2023 9.8  4.0 - 11.0 10e3/uL Final    RBC Count 09/12/2023 4.83  3.80 - 5.20 10e6/uL Final    Hemoglobin 09/12/2023 14.3  11.7 - 15.7 g/dL Final    Hematocrit 09/12/2023 42.9  35.0 - 47.0 % Final    MCV 09/12/2023 89  78 - 100 fL Final    MCH 09/12/2023 29.6  " 26.5 - 33.0 pg Final    MCHC 09/12/2023 33.3  31.5 - 36.5 g/dL Final    RDW 09/12/2023 12.3  10.0 - 15.0 % Final    Platelet Count 09/12/2023 272  150 - 450 10e3/uL Final    Sodium 09/12/2023 137  136 - 145 mmol/L Final    Potassium 09/12/2023 4.0  3.4 - 5.3 mmol/L Final    Chloride 09/12/2023 104  98 - 107 mmol/L Final    Carbon Dioxide (CO2) 09/12/2023 20 (L)  22 - 29 mmol/L Final    Anion Gap 09/12/2023 13  7 - 15 mmol/L Final    Urea Nitrogen 09/12/2023 11.5  6.0 - 20.0 mg/dL Final    Creatinine 09/12/2023 0.83  0.51 - 0.95 mg/dL Final    Calcium 09/12/2023 9.2  8.6 - 10.0 mg/dL Final    Glucose 09/12/2023 89  70 - 99 mg/dL Final    Alkaline Phosphatase 09/12/2023 61  35 - 104 U/L Final    AST 09/12/2023 16  0 - 45 U/L Final    Reference intervals for this test were updated on 6/12/2023 to more accurately reflect our healthy population. There may be differences in the flagging of prior results with similar values performed with this method. Interpretation of those prior results can be made in the context of the updated reference intervals.    ALT 09/12/2023 8  0 - 50 U/L Final    Reference intervals for this test were updated on 6/12/2023 to more accurately reflect our healthy population. There may be differences in the flagging of prior results with similar values performed with this method. Interpretation of those prior results can be made in the context of the updated reference intervals.      Protein Total 09/12/2023 7.4  6.4 - 8.3 g/dL Final    Albumin 09/12/2023 4.6  3.5 - 5.2 g/dL Final    Bilirubin Total 09/12/2023 0.9  <=1.2 mg/dL Final    GFR Estimate 09/12/2023 >90  >60 mL/min/1.73m2 Final    Hemoglobin A1C 09/12/2023 5.7 (H)  0.0 - 5.6 % Final    Normal <5.7%   Prediabetes 5.7-6.4%    Diabetes 6.5% or higher     Note: Adopted from ADA consensus guidelines.    Parathyroid Hormone Intact 09/12/2023 31  15 - 65 pg/mL Final    Vitamin A 09/12/2023 0.78  0.30 - 1.20 mg/L Final    Retinol Palmitate  "09/12/2023 <0.02  0.00 - 0.10 mg/L Final    Vitamin A Interp 09/12/2023 Normal   Final      This test was developed and its performance characteristics   determined by [facility]. It has not been cleared or   approved by the US Food and Drug Administration. This test   was performed in a CLIA certified laboratory and is   intended for clinical purposes.  Performed By: [facility]  [address] [place] [address]  : Jeffrey Fagan MD, PhD  CLIA Number: [id number]    Vitamin B12 09/12/2023 959  232 - 1,245 pg/mL Final    Vitamin D, Total (25-Hydroxy) 09/12/2023 17 (L)  20 - 75 ug/L Final              No data to display                  PHYSICAL EXAM:  Objective    Ht 1.575 m (5' 2\")   Wt 115.2 kg (254 lb)   BMI 46.46 kg/m             Physical Exam   GENERAL: Healthy, alert and no distress  EYES: Eyes grossly normal to inspection.  No discharge or erythema, or obvious scleral/conjunctival abnormalities.  RESP: No audible wheeze, cough, or visible cyanosis.  No visible retractions or increased work of breathing.    SKIN: Visible skin clear. No significant rash, abnormal pigmentation or lesions.  NEURO: Cranial nerves grossly intact.  Mentation and speech appropriate for age.  PSYCH: Mentation appears normal, affect normal/bright, judgement and insight intact, normal speech and appearance well-groomed.        Sincerely,    Arlette Corbett NP      Virtual Visit Details    Type of service:  Video Visit   Video Start Time: 0830  Video End Time:0850    Originating Location (pt. Location): Home    Distant Location (provider location):  Off-site  Platform used for Video Visit: Waqas"

## 2024-01-12 NOTE — NURSING NOTE
Is the patient currently in the state of [place]? YES    Visit mode:VIDEO    If the visit is dropped, the patient can be reconnected by: VIDEO VISIT: Text to cell phone:   Telephone Information:   Mobile [phone]       Will anyone else be joining the visit? NO  (If patient encounters technical issues they should call [phone]    How would you like to obtain your AVS? MyChart    Are changes needed to the allergy or medication list? No    Reason for visit: RECHECK    Bud VILA

## 2024-01-15 NOTE — TELEPHONE ENCOUNTER
Prior Authorization Approval    Medication: OZEMPIC (1 MG/DOSE) 4 MG/3ML SC SOPN  Authorization Effective Date: 1/13/2024  Authorization Expiration Date: 1/13/2025  Approved Dose/Quantity: 3ml/28 days   Reference #: [id number]   Insurance Company: [employer] - Phone [phone] Fax [phone]  Expected CoPay: $ 0  CoPay Card Available:      Financial Assistance Needed: no  Which Pharmacy is filling the prescription:    Pharmacy Notified: no  Patient Notified: pharmacy will notify patient

## 2024-04-18 ENCOUNTER — VIRTUAL VISIT (OUTPATIENT)
Dept: ENDOCRINOLOGY | Facility: CLINIC | Age: 39
End: 2024-04-18
Payer: COMMERCIAL

## 2024-04-18 ENCOUNTER — TELEPHONE (OUTPATIENT)
Dept: ENDOCRINOLOGY | Facility: CLINIC | Age: 39
End: 2024-04-18

## 2024-04-18 DIAGNOSIS — Z98.84 S/P LAPAROSCOPIC SLEEVE GASTRECTOMY: ICD-10-CM

## 2024-04-18 DIAGNOSIS — E11.9 TYPE 2 DIABETES MELLITUS WITHOUT COMPLICATION, WITHOUT LONG-TERM CURRENT USE OF INSULIN (H): ICD-10-CM

## 2024-04-18 DIAGNOSIS — E66.01 CLASS 3 SEVERE OBESITY DUE TO EXCESS CALORIES WITH SERIOUS COMORBIDITY AND BODY MASS INDEX (BMI) OF 50.0 TO 59.9 IN ADULT (H): Primary | ICD-10-CM

## 2024-04-18 DIAGNOSIS — E66.813 CLASS 3 SEVERE OBESITY DUE TO EXCESS CALORIES WITH SERIOUS COMORBIDITY AND BODY MASS INDEX (BMI) OF 50.0 TO 59.9 IN ADULT (H): Primary | ICD-10-CM

## 2024-04-18 PROCEDURE — 99213 OFFICE O/P EST LOW 20 MIN: CPT | Mod: 95 | Performed by: NURSE PRACTITIONER

## 2024-04-18 RX ORDER — TOPIRAMATE 25 MG/1
75 TABLET, FILM COATED ORAL DAILY
Qty: 90 TABLET | Refills: 3 | Status: SHIPPED | OUTPATIENT
Start: 2024-04-18 | End: 2024-09-13

## 2024-04-18 RX ORDER — SEMAGLUTIDE 1.7 MG/.75ML
1.7 INJECTION, SOLUTION SUBCUTANEOUS
Qty: 3 ML | Refills: 3 | Status: SHIPPED | OUTPATIENT
Start: 2024-04-18

## 2024-04-18 ASSESSMENT — PAIN SCALES - GENERAL: PAINLEVEL: NO PAIN (0)

## 2024-04-18 NOTE — NURSING NOTE
Is the patient currently in the state of [place]? YES    Visit mode:VIDEO    If the visit is dropped, the patient can be reconnected by: VIDEO VISIT: Text to cell phone:   Telephone Information:   Mobile [phone]       Will anyone else be joining the visit? NO  (If patient encounters technical issues they should call [phone]    How would you like to obtain your AVS? MyChart    Are changes needed to the allergy or medication list? No    Are refills needed on medications prescribed by this physician? YES     Reason for visit: RECHECK    Wt/ht other than 24 hrs:  month ago  Pain more than one location:    Tori Benton F

## 2024-04-18 NOTE — TELEPHONE ENCOUNTER
PA Initiation    Medication: WEGOVY 1.7 MG/0.75ML SC SOAJ  Insurance Company: Lisa - Phone [phone] Fax [phone]  Pharmacy Filling the Rx:    Filling Pharmacy Phone:    Filling Pharmacy Fax:    Start Date: 4/18/2024    Key:[id number]

## 2024-04-18 NOTE — PROGRESS NOTES
Return Bariatric Surgery Note    RE: Babita Rivas  MR#: [id number]  : 1985  VISIT DATE: 2024      Dear Elizabeth Pascual,    I had the pleasure of seeing your patient, Babita Rivas, in my post-bariatric surgery assessment clinic.    Assessment & Plan   Problem List Items Addressed This Visit        Digestive    Class 3 severe obesity due to excess calories with serious comorbidity and body mass index (BMI) of 50.0 to 59.9 in adult (H) - Primary     Feeling like she is seeing changes in weight since last seen. Noting improved appetite and cravings. Discussed need for weight checks for insurance purposes, to prove efficacy. She will update weight and send via [employer].     Likely getting insufficient protein. Really only eating 1 meal a day. Stopped protein shakes but notes she felt better when taking them. Will consider adding this. Discussed benefits of getting adequate protein and likelihood of getting sick without adequate nutrition/ hydration.     No adverse side effects to topiramate or wegovy. Would like to increase wegovy. Discussed need to go back to 1mg if not able to get enough nutrition.     Get weight updated   Increase protein- goal is 60g daily   Increase wegovy to 1.7mg   Continue topiramate 75mg   Great work with hydration   Follow up 3 months - labs will be due at that time          Relevant Medications    topiramate (TOPAMAX) 25 MG tablet    Semaglutide-Weight Management (WEGOVY) 1.7 MG/0.75ML pen       Endocrine    Diabetes mellitus, type 2 (H)    Relevant Medications    topiramate (TOPAMAX) 25 MG tablet    Semaglutide-Weight Management (WEGOVY) 1.7 MG/0.75ML pen       Other    S/P laparoscopic sleeve gastrectomy    Relevant Medications    topiramate (TOPAMAX) 25 MG tablet    Semaglutide-Weight Management (WEGOVY) 1.7 MG/0.75ML pen          22 minutes spent by me on the date of the encounter doing chart review, history and exam, documentation and further activities per the  "note    CHIEF COMPLAINT: Post-bariatric surgery follow-up. NBS 2/2021 high weight (290lb BMI 49.7) s/p sleeve 3/2022 with history of DMII and asthma. Restarted victoza post op. 5/2023 - noticing more snacking, discussed health psych and topiramate- had \"fallen off\" bariatric recommendations.   Last seen 1/12/2024 Feels she isn't losing weight as she had expected to. No recently weight.     HISTORY OF PRESENT ILLNESS:      4/18/2024    11:56 AM   Questions Regarding Prior Weight Loss Surgery Reviewed With Patient   I had the following weight loss procedure Sleeve Gastrectomy   What year was your surgery? 2023   How has your weight changed since your last visit? I have stayed about the same   Do you currently have any of the following Diabetes   Do you have any concerns today? Extra skin     Today:  Has been seeing weight loss - no weight since before starting     Anti-obesity medications:     Current:   wegovy   No nausea/ vomiting   No constipation/ diarrhea   Smaller portions, less hunger   topiramate   Less cravings   No fatigue   No brain fog     Recent diet changes:   Loves seafood - shrimp/ crab   Less fast food but smaller portions when eating that   Usually only 2 eating episodes daily   No protein shakes - hasn't been remembering to purchase them     -Protein- needs improvement   -Water- adequate     Recent exercise/activity changes: decorating home   Walking more   Walks to bus stop     Recent sleep changes: energy good, no change in sleep     Vitamins/Labs: 9/2023    GERD symptoms: none     Weight History:      4/18/2024    11:56 AM   --   What is your highest lifetime weight? 330   What is your lowest weight since surgery? (In pounds) 250     Initial Weight (lbs): 290 lbs  Weight: 112 kg (247 lb)     Cumulative weight loss (lbs): 43  Weight Loss Percentage: 14.83%        4/18/2024    11:56 AM   Questions Regarding Co-Morbidities and Health Concerns Reviewed With Patient   Pre-diabetes Never   Diabetes II " "Stayed the same   What was your most recent hemoglobin a1c 0   How many years have you had diabetes? 3   High Blood Pressure Never   High cholesterol Never   Heartburn/Reflux Never   Sleep apnea Never   PCOS Never   Back pain Improved   Joint pain Improved   Lower leg swelling Improved           4/18/2024    11:56 AM   Eating Habits   How many meals do you eat per day? 2   Do you snack between meals? Sometimes   How much food are you eating at each meal? 1/2 cup to 1 cup   Are you able to separate your meals and liquids by at least 30 minutes? Sometimes   Are you able to avoid liquid calories? Yes           4/18/2024    11:56 AM   Exercise Questions Reviewed With Patient   How often do you exercise? Daily   What is the duration of your exercise (in minutes)? 20 Minutes   What types of exercise do you do? walking   What keeps you from being more active? I should be more active but I just have not gotten around to it       Social History:      4/18/2024    11:56 AM   --   Are you smoking? No   Are you drinking alcohol? Yes   How much alcohol? every other weekend, special event, vodka sugar free drink,       Medications:  Current Outpatient Medications   Medication Sig Dispense Refill     Calcium Citrate-Vitamin D 500-10 MG-MCG CHEW Take 1 chew tab by mouth 2 times daily 60 tablet 11     cyanocobalamin (CYANOCOBALAMIN) 1000 MCG/ML injection Inject 1 mL (1,000 mcg) Subcutaneous every 30 days 3 mL 3     multivitamin w/minerals (MULTIVITAMINS W/MINERALS) tablet Take 1 tablet by mouth 2 times daily Chewable children's multivitamin tablet with iron 180 tablet 3     Semaglutide, 1 MG/DOSE, (OZEMPIC) 4 MG/3ML pen Inject 1 mg Subcutaneous every 7 days 3 mL 3     Semaglutide-Weight Management (WEGOVY) 1.7 MG/0.75ML pen Inject 1.7 mg Subcutaneous every 7 days 3 mL 3     Syringe/Needle, Disp, (BD ECLIPSE SYRINGE/NEEDLE) 25G X 5/8\" 3 ML MISC 1 Syringe every 30 days 3 each 3     topiramate (TOPAMAX) 25 MG tablet Take 3 tablets (75 " mg) by mouth daily 90 tablet 3     vitamin D3 (CHOLECALCIFEROL) 1.25 MG (45028 UT) capsule Take 1 capsule (50,000 Units) by mouth every 7 days 12 capsule 0     acetaminophen (TYLENOL) 325 MG tablet Take 2 tablets (650 mg) by mouth every 4 hours as needed for other (For optimal non-opioid multimodal pain management to improve pain control and physical function.) (Patient not taking: Reported on 1/12/2024) 30 tablet 0     hydrOXYzine (ATARAX) 10 MG tablet Take 10 mg by mouth        sertraline (ZOLOFT) 25 MG tablet Take 25 mg by mouth daily  (Patient not taking: Reported on 1/12/2024)       No current facility-administered medications for this visit.         4/18/2024    11:56 AM   --   Do you avoid NSAIDs such as (Ibuprofen, Aleve, Naproxen, Advil)? Yes       ROS:  GI:       4/18/2024    11:56 AM   --   Vomiting No   Diarrhea No   Constipation No   Swallowing trouble No   Abdominal pain No   Heartburn No     Skin:       4/18/2024    11:56 AM   BAR RBS ROS - SKIN   Rash in skin folds No     Psych:       4/18/2024    11:56 AM   --   Depression No   Anxiety No     Female Only:       4/18/2024    11:56 AM   BAR RBS ROS -    Female only Regular menstrual cycles   Stress urinary incontinence No       Lab on 09/12/2023   Component Date Value Ref Range Status     WBC Count 09/12/2023 9.8  4.0 - 11.0 10e3/uL Final     RBC Count 09/12/2023 4.83  3.80 - 5.20 10e6/uL Final     Hemoglobin 09/12/2023 14.3  11.7 - 15.7 g/dL Final     Hematocrit 09/12/2023 42.9  35.0 - 47.0 % Final     MCV 09/12/2023 89  78 - 100 fL Final     MCH 09/12/2023 29.6  26.5 - 33.0 pg Final     MCHC 09/12/2023 33.3  31.5 - 36.5 g/dL Final     RDW 09/12/2023 12.3  10.0 - 15.0 % Final     Platelet Count 09/12/2023 272  150 - 450 10e3/uL Final     Sodium 09/12/2023 137  136 - 145 mmol/L Final     Potassium 09/12/2023 4.0  3.4 - 5.3 mmol/L Final     Chloride 09/12/2023 104  98 - 107 mmol/L Final     Carbon Dioxide (CO2) 09/12/2023 20 (L)  22 - 29 mmol/L Final      Anion Gap 09/12/2023 13  7 - 15 mmol/L Final     Urea Nitrogen 09/12/2023 11.5  6.0 - 20.0 mg/dL Final     Creatinine 09/12/2023 0.83  0.51 - 0.95 mg/dL Final     Calcium 09/12/2023 9.2  8.6 - 10.0 mg/dL Final     Glucose 09/12/2023 89  70 - 99 mg/dL Final     Alkaline Phosphatase 09/12/2023 61  35 - 104 U/L Final     AST 09/12/2023 16  0 - 45 U/L Final    Reference intervals for this test were updated on 6/12/2023 to more accurately reflect our healthy population. There may be differences in the flagging of prior results with similar values performed with this method. Interpretation of those prior results can be made in the context of the updated reference intervals.     ALT 09/12/2023 8  0 - 50 U/L Final    Reference intervals for this test were updated on 6/12/2023 to more accurately reflect our healthy population. There may be differences in the flagging of prior results with similar values performed with this method. Interpretation of those prior results can be made in the context of the updated reference intervals.       Protein Total 09/12/2023 7.4  6.4 - 8.3 g/dL Final     Albumin 09/12/2023 4.6  3.5 - 5.2 g/dL Final     Bilirubin Total 09/12/2023 0.9  <=1.2 mg/dL Final     GFR Estimate 09/12/2023 >90  >60 mL/min/1.73m2 Final     Hemoglobin A1C 09/12/2023 5.7 (H)  0.0 - 5.6 % Final    Normal <5.7%   Prediabetes 5.7-6.4%    Diabetes 6.5% or higher     Note: Adopted from ADA consensus guidelines.     Parathyroid Hormone Intact 09/12/2023 31  15 - 65 pg/mL Final     Vitamin A 09/12/2023 0.78  0.30 - 1.20 mg/L Final     Retinol Palmitate 09/12/2023 <0.02  0.00 - 0.10 mg/L Final     Vitamin A Interp 09/12/2023 Normal   Final      This test was developed and its performance characteristics   determined by [facility]. It has not been cleared or   approved by the US Food and Drug Administration. This test   was performed in a CLIA certified laboratory and is   intended for clinical purposes.  Performed  "By: [facility]  [address] [place] [id number]  : Jeffrey Fagan MD, PhD  CLIA Number: [id number]     Vitamin B12 09/12/2023 959  232 - 1,245 pg/mL Final     Vitamin D, Total (25-Hydroxy) 09/12/2023 17 (L)  20 - 75 ug/L Final              No data to display                  PHYSICAL EXAM:  Objective    Ht 1.6 m (5' 3\")   Wt 112 kg (247 lb)   BMI 43.75 kg/m             Physical Exam   GENERAL: alert and no distress  EYES: Eyes grossly normal to inspection.  No discharge or erythema, or obvious scleral/conjunctival abnormalities.  RESP: No audible wheeze, cough, or visible cyanosis.    SKIN: Visible skin clear. No significant rash, abnormal pigmentation or lesions.  NEURO: Cranial nerves grossly intact.  Mentation and speech appropriate for age.  PSYCH: Appropriate affect, tone, and pace of words        Sincerely,    Arlette Corbett NP      Virtual Visit Details    Type of service:  Video Visit   Video Start Time: 1200  Video End Time:1222    Originating Location (pt. Location): Home    Distant Location (provider location):  Off-site  Platform used for Video Visit: [employer]  "

## 2024-04-18 NOTE — PATIENT INSTRUCTIONS
"Thank you for allowing us the privilege of caring for you. We hope we provided you with the excellent service you deserve.   Please let us know if there is anything else we can do for you so that we can be sure you are completely satisfied with your care experience.    To ensure the quality of our services you may be receiving a patient satisfaction survey from an independent patient satisfaction monitoring company.    The greatest compliment you can give is a \"Likely to Recommend\"    Your visit was with Arlette Corbett NP today.    Instructions per today's visit:     Aaron Rivas, it was great to visit with you today.  Here is a review of our visit.  If our clinic scheduler is not able to reach you please call [phone] to schedule your next appointments.    Get weight updated   Increase protein- goal is 60g daily   Increase wegovy to 1.7mg   Continue topiramate 75mg   Great work with hydration   Follow up 3 months - labs will be due at that time       Information about Video Visits with [facility]: video visit information  _________________________________________________________________________________________________________________________________________________________  If you are asked by your clinic team to have your blood pressure checked:  [facility] do offer several locations for blood pressure checks. Please follow the below link to schedule an appointment. Scheduling an appointment at the pharmacy for a blood pressure check is now preferred.    Appointment Plus ([email or web address])  _________________________________________________________________________________________________________________________________________________________  Important contact and scheduling information:  Please call our contact center at [phone] to schedule your next appointments.  To find a lab location near you, please call [phone].  For any nursing questions or concerns call Carmina Cavazos " KOBI at [phone] or Sandrine Salazar RN at [phone]  Please call during clinic hours Monday through Friday 8:00a - 4:00p if you have questions or you can contact us via [employer] at anytime and we will reply during clinic hours.    Lab results will be communicated through My Chart or letter (if My Chart not used). Please call the clinic if you have not received communication after 1 week or if you have any questions.?  Clinic Fax: [phone]    _________________________________________________________________________________________________________________________________________________________  Meal Replacement Products:    Here is the link to our new e-store where you can purchase our meal replacement products    [facility] E-Store  [email or web address]    The one week starter kit is a great way to sample a variety of products and see what works for you.    If you want more information about the product go to: [employer]    If you are an employee or [facility] or [facility] please contact your care team for a 10% estore discount    Free Shipping for orders over $75     Benefits of meal replacements products:    Portion and calorie control  Improved nutrition  Structured eating  Simplified food choices  Avoid contact with trigger foods  _________________________________________________________________________________________________________________________________________________________  Interested in working with a health ?  Health coaches work with you to improve your overall health and wellbeing.  They look at the whole person, and may involve discussion of different areas of life, including, but not limited to the four pillars of health (sleep, exercise, nutrition, and stress management). Discuss with your care team if you would like to start working a health .  Health Coaching-3 Pack: Schedule by calling [phone]    $99 for  three health coaching visits    Visits may be done in person or via phone    Coaching is a partnership between the  and the client; Coaches do not prescribe or diagnose    Coaching helps inspire the client to reach his/her personal goals   _________________________________________________________________________________________________________________________________________________________  24 Week Healthy Lifestyle Plan:    Our mission in the 24-week Healthy Lifestyle Plan is to provide you with individualized care by giving you the tools, education and support you need to lose weight and maintain a healthy lifestyle. In your 24-week journey, you ll be supported by a dedicated weight loss team that includes registered dietitians, medical weight management providers, health coaches, and nurses -- all with special expertise in weight loss -- to help you every step of the way.     Monthly meetings with your registered dietician or medical weight management provider help to review your progress, update your care plan, and make any adjustments needed to ensure success. Between these visits, weekly and bi-weekly health  visits will help you focus on the four pillars of weight loss -- stress, sleep, nutrition, and exercise -- and how you can best adapt each to achieve sustainable weight loss results.    In addition, you will be given exclusive access to online wellbeing classes through [employer].  Your initial visit will be with a medical weight management provider who will help to understand your weight loss goals and ensure this program is the right fit for you. Please let our team know if you are interested in the 24 week plan by sending a message to your care team or calling [phone] to schedule.  _________________________________________________________________________________________________________________________________________________________  __________  [facility] Get Moving  Program  Our team of physical therapists is trained to help you understand and take control of your condition. They will perform a thorough evaluation to determine your ability for activity and develop a customized plan to fit your goals and physical ability.  Scheduling: Unsure if the Get Moving program is right for you? Discuss the program with your medical provider or diabetes educator. You can also call us at [phone] to ask questions or schedule an appointment.   [facility] Get Moving Program  ____________________________________________________________________________________________________________________________________________________________________________  [facility] Diabetes Prevention Program (DPP)  If you have prediabetes and Medicare please contact us via [employer] to learn more about the Diabetes Prevention Program (DPP)  Program Details:  [facility] offers the year-long Diabetes Prevention Program (DPP). The program helps you to make lifestyle changes that prevent or delay type 2 diabetes by supporting healthy eating, increased physical activity, stress reduction and use of coping skills.   On average, previous [facility] DPP cohorts have lost and maintained at least 5% of their starting weight throughout the program and averaged more than 150 minutes of physical activity per week.  Participants meet weekly for one-hour group sessions over sixteen weeks, every other week for the next 8 weeks, and monthly for the last six months.   A year-long maintenance program is also available for participants who complete the first year.   Location & Cost:   During the COVID-19 Public Health Emergency, the program is offered virtually. When in-person classes can resume, they will be held at [facility].  For people with Medicare, the program is covered in full. A self-pay option will also be available for those with non-Medicare insurance plans.    ______________________________________________________________________________________________________________________________________________________________________________________________________________________________    To work with a Behavioral Health Psychologist:    Call to schedule:    Jasmeet Helton - [phone]  Obed Basurto - [phone]  Marina Dias - [phone]  Elizabeth Hobbs - [phone]   Doris Wilburn PhD (cannot accept Medicare) [phone]        Thank DINORAH borges [facility] Comprehensive Weight Management Team

## 2024-04-18 NOTE — LETTER
2024       RE: Babita Rivas  [address]  [place] [address]     Dear Colleague,    Thank you for referring your patient, Babita Rivas, to the [facility] [place] [facility]. Please see a copy of my visit note below.    Return Bariatric Surgery Note    RE: Babita Rivas  MR#: [id number]  : 1985  VISIT DATE: 2024      Dear Elizabeth Pascual,    I had the pleasure of seeing your patient, Babita Rivas, in my post-bariatric surgery assessment clinic.    Assessment & Plan   Problem List Items Addressed This Visit          Digestive    Class 3 severe obesity due to excess calories with serious comorbidity and body mass index (BMI) of 50.0 to 59.9 in adult (H) - Primary     Feeling like she is seeing changes in weight since last seen. Noting improved appetite and cravings. Discussed need for weight checks for insurance purposes, to prove efficacy. She will update weight and send via [employer].     Likely getting insufficient protein. Really only eating 1 meal a day. Stopped protein shakes but notes she felt better when taking them. Will consider adding this. Discussed benefits of getting adequate protein and likelihood of getting sick without adequate nutrition/ hydration.     No adverse side effects to topiramate or wegovy. Would like to increase wegovy. Discussed need to go back to 1mg if not able to get enough nutrition.     Get weight updated   Increase protein- goal is 60g daily   Increase wegovy to 1.7mg   Continue topiramate 75mg   Great work with hydration   Follow up 3 months - labs will be due at that time          Relevant Medications    topiramate (TOPAMAX) 25 MG tablet    Semaglutide-Weight Management (WEGOVY) 1.7 MG/0.75ML pen       Endocrine    Diabetes mellitus, type 2 (H)    Relevant Medications    topiramate (TOPAMAX) 25 MG tablet    Semaglutide-Weight Management (WEGOVY)  "1.7 MG/0.75ML pen       Other    S/P laparoscopic sleeve gastrectomy    Relevant Medications    topiramate (TOPAMAX) 25 MG tablet    Semaglutide-Weight Management (WEGOVY) 1.7 MG/0.75ML pen          22 minutes spent by me on the date of the encounter doing chart review, history and exam, documentation and further activities per the note    CHIEF COMPLAINT: Post-bariatric surgery follow-up. NBS 2/2021 high weight (290lb BMI 49.7) s/p sleeve 3/2022 with history of DMII and asthma. Restarted victoza post op. 5/2023 - noticing more snacking, discussed health psych and topiramate- had \"fallen off\" bariatric recommendations.   Last seen 1/12/2024 Feels she isn't losing weight as she had expected to. No recently weight.     HISTORY OF PRESENT ILLNESS:      4/18/2024    11:56 AM   Questions Regarding Prior Weight Loss Surgery Reviewed With Patient   I had the following weight loss procedure Sleeve Gastrectomy   What year was your surgery? 2023   How has your weight changed since your last visit? I have stayed about the same   Do you currently have any of the following Diabetes   Do you have any concerns today? Extra skin     Today:  Has been seeing weight loss - no weight since before starting     Anti-obesity medications:     Current:   wegovy   No nausea/ vomiting   No constipation/ diarrhea   Smaller portions, less hunger   topiramate   Less cravings   No fatigue   No brain fog     Recent diet changes:   Loves seafood - shrimp/ crab   Less fast food but smaller portions when eating that   Usually only 2 eating episodes daily   No protein shakes - hasn't been remembering to purchase them     -Protein- needs improvement   -Water- adequate     Recent exercise/activity changes: decorating home   Walking more   Walks to bus stop     Recent sleep changes: energy good, no change in sleep     Vitamins/Labs: 9/2023    GERD symptoms: none     Weight History:      4/18/2024    11:56 AM   --   What is your highest lifetime weight? " 330   What is your lowest weight since surgery? (In pounds) 250     Initial Weight (lbs): 290 lbs  Weight: 114.8 kg (253 lb)     Cumulative weight loss (lbs): 37  Weight Loss Percentage: 12.76%        4/18/2024    11:56 AM   Questions Regarding Co-Morbidities and Health Concerns Reviewed With Patient   Pre-diabetes Never   Diabetes II Stayed the same   What was your most recent hemoglobin a1c 0   How many years have you had diabetes? 3   High Blood Pressure Never   High cholesterol Never   Heartburn/Reflux Never   Sleep apnea Never   PCOS Never   Back pain Improved   Joint pain Improved   Lower leg swelling Improved           4/18/2024    11:56 AM   Eating Habits   How many meals do you eat per day? 2   Do you snack between meals? Sometimes   How much food are you eating at each meal? 1/2 cup to 1 cup   Are you able to separate your meals and liquids by at least 30 minutes? Sometimes   Are you able to avoid liquid calories? Yes           4/18/2024    11:56 AM   Exercise Questions Reviewed With Patient   How often do you exercise? Daily   What is the duration of your exercise (in minutes)? 20 Minutes   What types of exercise do you do? walking   What keeps you from being more active? I should be more active but I just have not gotten around to it       Social History:      4/18/2024    11:56 AM   --   Are you smoking? No   Are you drinking alcohol? Yes   How much alcohol? every other weekend, special event, vodka sugar free drink,       Medications:  Current Outpatient Medications   Medication Sig Dispense Refill    Calcium Citrate-Vitamin D 500-10 MG-MCG CHEW Take 1 chew tab by mouth 2 times daily 60 tablet 11    cyanocobalamin (CYANOCOBALAMIN) 1000 MCG/ML injection Inject 1 mL (1,000 mcg) Subcutaneous every 30 days 3 mL 3    multivitamin w/minerals (MULTIVITAMINS W/MINERALS) tablet Take 1 tablet by mouth 2 times daily Chewable children's multivitamin tablet with iron 180 tablet 3    Semaglutide, 1 MG/DOSE,  "(OZEMPIC) 4 MG/3ML pen Inject 1 mg Subcutaneous every 7 days 3 mL 3    Semaglutide-Weight Management (WEGOVY) 1.7 MG/0.75ML pen Inject 1.7 mg Subcutaneous every 7 days 3 mL 3    Syringe/Needle, Disp, (BD ECLIPSE SYRINGE/NEEDLE) 25G X 5/8\" 3 ML MISC 1 Syringe every 30 days 3 each 3    topiramate (TOPAMAX) 25 MG tablet Take 3 tablets (75 mg) by mouth daily 90 tablet 3    vitamin D3 (CHOLECALCIFEROL) 1.25 MG (06871 UT) capsule Take 1 capsule (50,000 Units) by mouth every 7 days 12 capsule 0    acetaminophen (TYLENOL) 325 MG tablet Take 2 tablets (650 mg) by mouth every 4 hours as needed for other (For optimal non-opioid multimodal pain management to improve pain control and physical function.) (Patient not taking: Reported on 1/12/2024) 30 tablet 0    hydrOXYzine (ATARAX) 10 MG tablet Take 10 mg by mouth       sertraline (ZOLOFT) 25 MG tablet Take 25 mg by mouth daily  (Patient not taking: Reported on 1/12/2024)       No current facility-administered medications for this visit.         4/18/2024    11:56 AM   --   Do you avoid NSAIDs such as (Ibuprofen, Aleve, Naproxen, Advil)? Yes       ROS:  GI:       4/18/2024    11:56 AM   --   Vomiting No   Diarrhea No   Constipation No   Swallowing trouble No   Abdominal pain No   Heartburn No     Skin:       4/18/2024    11:56 AM   BAR RBS ROS - SKIN   Rash in skin folds No     Psych:       4/18/2024    11:56 AM   --   Depression No   Anxiety No     Female Only:       4/18/2024    11:56 AM   BAR RBS ROS -    Female only Regular menstrual cycles   Stress urinary incontinence No       Lab on 09/12/2023   Component Date Value Ref Range Status    WBC Count 09/12/2023 9.8  4.0 - 11.0 10e3/uL Final    RBC Count 09/12/2023 4.83  3.80 - 5.20 10e6/uL Final    Hemoglobin 09/12/2023 14.3  11.7 - 15.7 g/dL Final    Hematocrit 09/12/2023 42.9  35.0 - 47.0 % Final    MCV 09/12/2023 89  78 - 100 fL Final    MCH 09/12/2023 29.6  26.5 - 33.0 pg Final    MCHC 09/12/2023 33.3  31.5 - 36.5 g/dL " Final    RDW 09/12/2023 12.3  10.0 - 15.0 % Final    Platelet Count 09/12/2023 272  150 - 450 10e3/uL Final    Sodium 09/12/2023 137  136 - 145 mmol/L Final    Potassium 09/12/2023 4.0  3.4 - 5.3 mmol/L Final    Chloride 09/12/2023 104  98 - 107 mmol/L Final    Carbon Dioxide (CO2) 09/12/2023 20 (L)  22 - 29 mmol/L Final    Anion Gap 09/12/2023 13  7 - 15 mmol/L Final    Urea Nitrogen 09/12/2023 11.5  6.0 - 20.0 mg/dL Final    Creatinine 09/12/2023 0.83  0.51 - 0.95 mg/dL Final    Calcium 09/12/2023 9.2  8.6 - 10.0 mg/dL Final    Glucose 09/12/2023 89  70 - 99 mg/dL Final    Alkaline Phosphatase 09/12/2023 61  35 - 104 U/L Final    AST 09/12/2023 16  0 - 45 U/L Final    Reference intervals for this test were updated on 6/12/2023 to more accurately reflect our healthy population. There may be differences in the flagging of prior results with similar values performed with this method. Interpretation of those prior results can be made in the context of the updated reference intervals.    ALT 09/12/2023 8  0 - 50 U/L Final    Reference intervals for this test were updated on 6/12/2023 to more accurately reflect our healthy population. There may be differences in the flagging of prior results with similar values performed with this method. Interpretation of those prior results can be made in the context of the updated reference intervals.      Protein Total 09/12/2023 7.4  6.4 - 8.3 g/dL Final    Albumin 09/12/2023 4.6  3.5 - 5.2 g/dL Final    Bilirubin Total 09/12/2023 0.9  <=1.2 mg/dL Final    GFR Estimate 09/12/2023 >90  >60 mL/min/1.73m2 Final    Hemoglobin A1C 09/12/2023 5.7 (H)  0.0 - 5.6 % Final    Normal <5.7%   Prediabetes 5.7-6.4%    Diabetes 6.5% or higher     Note: Adopted from ADA consensus guidelines.    Parathyroid Hormone Intact 09/12/2023 31  15 - 65 pg/mL Final    Vitamin A 09/12/2023 0.78  0.30 - 1.20 mg/L Final    Retinol Palmitate 09/12/2023 <0.02  0.00 - 0.10 mg/L Final    Vitamin A Interp  "09/12/2023 Normal   Final      This test was developed and its performance characteristics   determined by [facility]. It has not been cleared or   approved by the US Food and Drug Administration. This test   was performed in a CLIA certified laboratory and is   intended for clinical purposes.  Performed By: [facility]  [address] [place] [id number]  : Jeffrey Fagan MD, PhD  CLIA Number: [id number]    Vitamin B12 09/12/2023 959  232 - 1,245 pg/mL Final    Vitamin D, Total (25-Hydroxy) 09/12/2023 17 (L)  20 - 75 ug/L Final              No data to display                  PHYSICAL EXAM:  Objective    Ht 1.6 m (5' 3\")   Wt 114.8 kg (253 lb)   BMI 44.82 kg/m    Vitals - Patient Reported  Pain Score: No Pain (0)        Physical Exam   GENERAL: alert and no distress  EYES: Eyes grossly normal to inspection.  No discharge or erythema, or obvious scleral/conjunctival abnormalities.  RESP: No audible wheeze, cough, or visible cyanosis.    SKIN: Visible skin clear. No significant rash, abnormal pigmentation or lesions.  NEURO: Cranial nerves grossly intact.  Mentation and speech appropriate for age.  PSYCH: Appropriate affect, tone, and pace of words        Sincerely,    Arlette Corbett NP      Virtual Visit Details    Type of service:  Video Visit   Video Start Time: 1200  Video End Time:1222    Originating Location (pt. Location): Home    Distant Location (provider location):  Off-site  Platform used for Video Visit: Waqas    "

## 2024-04-18 NOTE — ASSESSMENT & PLAN NOTE
Feeling like she is seeing changes in weight since last seen. Noting improved appetite and cravings. Discussed need for weight checks for insurance purposes, to prove efficacy. She will update weight and send via [employer].     Likely getting insufficient protein. Really only eating 1 meal a day. Stopped protein shakes but notes she felt better when taking them. Will consider adding this. Discussed benefits of getting adequate protein and likelihood of getting sick without adequate nutrition/ hydration.     No adverse side effects to topiramate or wegovy. Would like to increase wegovy. Discussed need to go back to 1mg if not able to get enough nutrition.     Get weight updated   Increase protein- goal is 60g daily   Increase wegovy to 1.7mg   Continue topiramate 75mg   Great work with hydration   Follow up 3 months - labs will be due at that time

## 2024-04-19 VITALS — BODY MASS INDEX: 43.77 KG/M2 | WEIGHT: 247 LBS | HEIGHT: 63 IN

## 2024-04-19 NOTE — TELEPHONE ENCOUNTER
Prior Authorization Approval    Medication: WEGOVY 1.7 MG/0.75ML SC SOAJ  Authorization Effective Date: 4/18/2024  Authorization Expiration Date: 10/18/2024  Approved Dose/Quantity: 3ml/28 days   Reference #: [id number]   Insurance Company: [employer] - Phone [phone] Fax [phone]  Expected CoPay: $ 0  CoPay Card Available:      Financial Assistance Needed: no  Which Pharmacy is filling the prescription:    Pharmacy Notified: no  Patient Notified: pharmacy will notify patient

## 2024-04-25 ENCOUNTER — TELEPHONE (OUTPATIENT)
Dept: ENDOCRINOLOGY | Facility: CLINIC | Age: 39
End: 2024-04-25
Payer: COMMERCIAL

## 2024-04-25 NOTE — TELEPHONE ENCOUNTER
Patient confirmed scheduled appointment:  Date: 9/13/24  Time: 11:30 am  Visit type: RET KARLOS  Provider: Arlette Corbett  Location: virtual  Testing/imaging: n/a  Additional notes: n/a

## 2024-05-11 ENCOUNTER — HEALTH MAINTENANCE LETTER (OUTPATIENT)
Age: 39
End: 2024-05-11

## 2024-05-20 DIAGNOSIS — E11.9 TYPE 2 DIABETES MELLITUS WITHOUT COMPLICATION, WITHOUT LONG-TERM CURRENT USE OF INSULIN (H): ICD-10-CM

## 2024-09-11 DIAGNOSIS — E66.813 CLASS 3 SEVERE OBESITY DUE TO EXCESS CALORIES WITH SERIOUS COMORBIDITY AND BODY MASS INDEX (BMI) OF 50.0 TO 59.9 IN ADULT (H): ICD-10-CM

## 2024-09-11 DIAGNOSIS — Z98.84 S/P LAPAROSCOPIC SLEEVE GASTRECTOMY: ICD-10-CM

## 2024-09-11 DIAGNOSIS — E66.01 CLASS 3 SEVERE OBESITY DUE TO EXCESS CALORIES WITH SERIOUS COMORBIDITY AND BODY MASS INDEX (BMI) OF 50.0 TO 59.9 IN ADULT (H): ICD-10-CM

## 2024-09-11 DIAGNOSIS — E11.9 TYPE 2 DIABETES MELLITUS WITHOUT COMPLICATION, WITHOUT LONG-TERM CURRENT USE OF INSULIN (H): ICD-10-CM

## 2024-09-13 ENCOUNTER — VIRTUAL VISIT (OUTPATIENT)
Dept: ENDOCRINOLOGY | Facility: CLINIC | Age: 39
End: 2024-09-13
Payer: COMMERCIAL

## 2024-09-13 ENCOUNTER — TELEPHONE (OUTPATIENT)
Dept: ENDOCRINOLOGY | Facility: CLINIC | Age: 39
End: 2024-09-13

## 2024-09-13 VITALS — HEIGHT: 63 IN | BODY MASS INDEX: 43.77 KG/M2

## 2024-09-13 DIAGNOSIS — Z98.84 S/P LAPAROSCOPIC SLEEVE GASTRECTOMY: ICD-10-CM

## 2024-09-13 DIAGNOSIS — E66.01 CLASS 3 SEVERE OBESITY DUE TO EXCESS CALORIES WITH SERIOUS COMORBIDITY AND BODY MASS INDEX (BMI) OF 50.0 TO 59.9 IN ADULT (H): Primary | ICD-10-CM

## 2024-09-13 DIAGNOSIS — Z86.19 HISTORY OF HELICOBACTER PYLORI INFECTION: ICD-10-CM

## 2024-09-13 DIAGNOSIS — E66.813 CLASS 3 SEVERE OBESITY DUE TO EXCESS CALORIES WITH SERIOUS COMORBIDITY AND BODY MASS INDEX (BMI) OF 50.0 TO 59.9 IN ADULT (H): Primary | ICD-10-CM

## 2024-09-13 PROCEDURE — G2211 COMPLEX E/M VISIT ADD ON: HCPCS | Mod: 95 | Performed by: NURSE PRACTITIONER

## 2024-09-13 PROCEDURE — 99214 OFFICE O/P EST MOD 30 MIN: CPT | Mod: 95 | Performed by: NURSE PRACTITIONER

## 2024-09-13 NOTE — PATIENT INSTRUCTIONS
Increase fluids- 60oz daily   Increase protein- 60g daily   Update labs   Consider switching to zepbound 5mg   See dietitian   Follow up 3 months

## 2024-09-13 NOTE — TELEPHONE ENCOUNTER
PA Initiation    Medication: ZEPBOUND 5 MG/0.5ML SC SOAJ  Insurance Company: [facility] - Phone [phone] Fax [phone]  Pharmacy Filling the Rx: [id number] - [facility], [place] - [address] [facility]  Filling Pharmacy Phone:    Filling Pharmacy Fax:    Start Date: 9/13/2024    Key: [id number]

## 2024-09-13 NOTE — LETTER
2024       RE: Babita Rivas  [address]  [facility] [address]     Dear Colleague,    Thank you for referring your patient, Babita Rivas, to the [facility] [place] at [facility]. Please see a copy of my visit note below.    Virtual Visit Details    Type of service:  Video Visit   Video Start Time:  1137  Video End Time: 1159    Originating Location (pt. Location): Home    Distant Location (provider location):  Off-site  Platform used for Video Visit: [facility] Bariatric Surgery Note    RE: Babita Rivas  MR#: [id number]  : 1985  VISIT DATE: Sep 13, 2024      Dear Elizabeth Pascual,    I had the pleasure of seeing your patient, Babita Rivas, in my post-bariatric surgery assessment clinic.    Assessment & Plan  Problem List Items Addressed This Visit          Digestive    Class 3 severe obesity due to excess calories with serious comorbidity and body mass index (BMI) of 50.0 to 59.9 in adult (H) - Primary     Generalized abdominal discomfort intermittently for the last month with vague description, no triggering or alleviating events. Symptoms are gradual onset and never resolve. Not associated with reflux, vomiting, constipation, diarrhea, or eating. Chronically under eating and drinking which is common trigger for abdominal discomfort when taking GLp1 - question if this is trigger. Recommend reducing dose of wegovy or switching to zepbound at a lower dose. Patient would like labs first, she is due for bariatric labs. Positive H.Pylori at time of sleeve which was treated but confirmation never done. Will do confirmation now.     Increase fluids- 60oz daily   Increase protein- 60g daily   Update labs   Consider switching to zepbound 5mg   See dietitian   Follow up 3 months          Relevant Medications    tirzepatide-Weight Management (ZEPBOUND) 5 MG/0.5ML prefilled pen    Other Relevant  "Orders    CBC with platelets    Comprehensive metabolic panel    Hemoglobin A1c    Parathyroid Hormone Intact    Vitamin A    Vitamin B12    Vitamin D Deficiency    Lipid panel reflex to direct LDL Fasting    Lipase    Helicobacter pylori Antigen Stool    Adult Bariatrics and Weight Management Clinic Follow-Up Order    Adult Bariatrics and Weight Management Clinic Follow-Up Order       Other    S/P laparoscopic sleeve gastrectomy    Relevant Orders    CBC with platelets    Comprehensive metabolic panel    Hemoglobin A1c    Parathyroid Hormone Intact    Vitamin A    Vitamin B12    Vitamin D Deficiency    Lipid panel reflex to direct LDL Fasting    Lipase    Helicobacter pylori Antigen Stool    Adult [facility]ariatrics and Weight Management Clinic Follow-Up Order    Adult Bariatrics and [facility] Management Clinic Follow-Up Order     Other Visit Diagnoses       History of Helicobacter pylori infection        Relevant Orders    Helicobacter pylori Antigen Stool    Adult Bariatrics and Weight Management Clinic Follow-Up Order    Adult Bariatrics and Weight Management Clinic Follow-Up Order               30 minutes spent by me on the date of the encounter doing chart review, history and exam, documentation and further activities per the note    CHIEF COMPLAINT: Post-bariatric surgery follow-up. NBS 2/2021 high weight (290lb BMI 49.7) s/p sleeve 3/2022 with history of DMII and asthma. Restarted victoza post op. 5/2023 - noticing more snacking, discussed health psych and topiramate- had \"fallen off\" bariatric recommendations.   Last seen 4/2024 - discussed increasing protein        HISTORY OF PRESENT ILLNESS:      9/13/2024    11:07 AM   Questions Regarding Prior Weight Loss Surgery Reviewed With Patient   I had the following weight loss procedure Sleeve Gastrectomy   What year was your surgery? 2022   How has your weight changed since your last visit? I have stayed about the same   Do you currently have any of the following None of " the above   Do you have any concerns today? stomach pain, labs/stool sample     Abdominal pain starting 1 month ago. Pain is generalized but sometimes up under ribs. Sometimes feels like hungy but not hungry. Sometimes feels sharp. Nothing makes it better. Not associated with eating. Not keeping her from eating. No reflux. No vomiting. Some constipation just recently - hard to pass- usually has BM daily and can empty bowel. Usually has formed BM shortly after meals.     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 300   What is your lowest weight since surgery? (In pounds) 235     Initial Weight (lbs): 290 lbs  Weight:  (no new weight)  Last Visits Weight: 112 kg (247 lb)              9/13/2024    11:07 AM   Questions Regarding Co-Morbidities and Health Concerns Reviewed With Patient   Pre-diabetes Never   Diabetes II Improved   High Blood Pressure Never   High cholesterol Never   Heartburn/Reflux Never   Sleep apnea Never   PCOS Never   Back pain Gone away   Joint pain Never   Lower leg swelling Never           9/13/2024    11:07 AM   Eating Habits   How many meals do you eat per day? 1   Do you snack between meals? No   How much food are you eating at each meal? Less than 1/2  "cup   Are you able to separate your meals and liquids by at least 30 minutes? Yes   Are you able to avoid liquid calories? Yes           9/13/2024    11:07 AM   Exercise Questions Reviewed With Patient   How often do you exercise? 5 to 6 times per week   What is the duration of your exercise (in minutes)? 30 Minutes   What types of exercise do you do? walking   What keeps you from being more active? I should be more active but I just have not gotten around to it       Social History:      9/13/2024    11:07 AM   --   Are you smoking? No   Are you drinking alcohol? Yes   How much alcohol? 3/week       Medications:  Current Outpatient Medications   Medication Sig Dispense Refill     tirzepatide-Weight Management (ZEPBOUND) 5 MG/0.5ML prefilled pen Inject 0.5 mLs (5 mg) subcutaneously every 7 days. 2 mL 2     acetaminophen (TYLENOL) 325 MG tablet Take 2 tablets (650 mg) by mouth every 4 hours as needed for other (For optimal non-opioid multimodal pain management to improve pain control and physical function.) (Patient not taking: Reported on 1/12/2024) 30 tablet 0     Calcium Citrate-Vitamin D 500-10 MG-MCG CHEW Take 1 chew tab by mouth 2 times daily 60 tablet 11     cyanocobalamin (CYANOCOBALAMIN) 1000 MCG/ML injection Inject 1 mL (1,000 mcg) Subcutaneous every 30 days 3 mL 3     hydrOXYzine (ATARAX) 10 MG tablet Take 10 mg by mouth        multivitamin w/minerals (MULTIVITAMINS W/MINERALS) tablet Take 1 tablet by mouth 2 times daily Chewable children's multivitamin tablet with iron 180 tablet 3     Semaglutide-Weight Management (WEGOVY) 1.7 MG/0.75ML pen Inject 1.7 mg Subcutaneous every 7 days 3 mL 3     sertraline (ZOLOFT) 25 MG tablet Take 25 mg by mouth daily  (Patient not taking: Reported on 1/12/2024)       Syringe/Needle, Disp, (BD ECLIPSE SYRINGE/NEEDLE) 25G X 5/8\" 3 ML MISC 1 Syringe every 30 days 3 each 3     topiramate (TOPAMAX) 25 MG tablet Take 3 tablets (75 mg) by mouth daily 90 tablet 3     vitamin D3 " (CHOLECALCIFEROL) 1.25 MG (89086 UT) capsule Take 1 capsule (50,000 Units) by mouth every 7 days 12 capsule 0     No current facility-administered medications for this visit.         9/13/2024    11:07 AM   --   Do you avoid NSAIDs such as (Ibuprofen, Aleve, Naproxen, Advil)? Yes       ROS:  GI:       9/13/2024    11:07 AM   --   Vomiting No   Diarrhea No   Constipation No   Swallowing trouble No   Abdominal pain Yes   Heartburn No     Skin:       9/13/2024    11:07 AM   BAR RBS ROS - SKIN   Rash in skin folds No     Psych:       9/13/2024    11:07 AM   --   Depression Yes   Anxiety No     Female Only:       9/13/2024    11:07 AM   BAR RBS ROS -    Female only None of the above   Stress urinary incontinence No       Lab on 09/12/2023   Component Date Value Ref Range Status     WBC Count 09/12/2023 9.8  4.0 - 11.0 10e3/uL Final     RBC Count 09/12/2023 4.83  3.80 - 5.20 10e6/uL Final     Hemoglobin 09/12/2023 14.3  11.7 - 15.7 g/dL Final     Hematocrit 09/12/2023 42.9  35.0 - 47.0 % Final     MCV 09/12/2023 89  78 - 100 fL Final     MCH 09/12/2023 29.6  26.5 - 33.0 pg Final     MCHC 09/12/2023 33.3  31.5 - 36.5 g/dL Final     RDW 09/12/2023 12.3  10.0 - 15.0 % Final     Platelet Count 09/12/2023 272  150 - 450 10e3/uL Final     Sodium 09/12/2023 137  136 - 145 mmol/L Final     Potassium 09/12/2023 4.0  3.4 - 5.3 mmol/L Final     Chloride 09/12/2023 104  98 - 107 mmol/L Final     Carbon Dioxide (CO2) 09/12/2023 20 (L)  22 - 29 mmol/L Final     Anion Gap 09/12/2023 13  7 - 15 mmol/L Final     Urea Nitrogen 09/12/2023 11.5  6.0 - 20.0 mg/dL Final     Creatinine 09/12/2023 0.83  0.51 - 0.95 mg/dL Final     Calcium 09/12/2023 9.2  8.6 - 10.0 mg/dL Final     Glucose 09/12/2023 89  70 - 99 mg/dL Final     Alkaline Phosphatase 09/12/2023 61  35 - 104 U/L Final     AST 09/12/2023 16  0 - 45 U/L Final    Reference intervals for this test were updated on 6/12/2023 to more accurately reflect our healthy population. There may  "be differences in the flagging of prior results with similar values performed with this method. Interpretation of those prior results can be made in the context of the updated reference intervals.     ALT 09/12/2023 8  0 - 50 U/L Final    Reference intervals for this test were updated on 6/12/2023 to more accurately reflect our healthy population. There may be differences in the flagging of prior results with similar values performed with this method. Interpretation of those prior results can be made in the context of the updated reference intervals.       Protein Total 09/12/2023 7.4  6.4 - 8.3 g/dL Final     Albumin 09/12/2023 4.6  3.5 - 5.2 g/dL Final     Bilirubin Total 09/12/2023 0.9  <=1.2 mg/dL Final     GFR Estimate 09/12/2023 >90  >60 mL/min/1.73m2 Final     Hemoglobin A1C 09/12/2023 5.7 (H)  0.0 - 5.6 % Final    Normal <5.7%   Prediabetes 5.7-6.4%    Diabetes 6.5% or higher     Note: Adopted from ADA consensus guidelines.     Parathyroid Hormone Intact 09/12/2023 31  15 - 65 pg/mL Final     Vitamin A 09/12/2023 0.78  0.30 - 1.20 mg/L Final     Retinol Palmitate 09/12/2023 <0.02  0.00 - 0.10 mg/L Final     Vitamin A Interp 09/12/2023 Normal   Final      This test was developed and its performance characteristics   determined by [facility]. It has not been cleared or   approved by the US Food and Drug Administration. This test   was performed in a CLIA certified laboratory and is   intended for clinical purposes.  Performed By: [facility]  [address] [place] [address]  : Jeffrey Fgaan MD, PhD  CLIA Number: [id number]     Vitamin B12 09/12/2023 959  232 - 1,245 pg/mL Final     Vitamin D, Total (25-Hydroxy) 09/12/2023 17 (L)  20 - 75 ug/L Final              No data to display                  PHYSICAL EXAM:  Objective   Ht 1.6 m (5' 2.99\")   BMI 43.77 kg/m           Vitals:  No vitals were obtained today due to virtual visit.    Physical Exam   GENERAL: " alert and no distress  EYES: Eyes grossly normal to inspection.  No discharge or erythema, or obvious scleral/conjunctival abnormalities.  RESP: No audible wheeze, cough, or visible cyanosis.    SKIN: Visible skin clear. No significant rash, abnormal pigmentation or lesions.  NEURO: Cranial nerves grossly intact.  Mentation and speech appropriate for age.  PSYCH: Appropriate affect, tone, and pace of words        Sincerely,    Arlette Corbett NP    The longitudinal plan of care for the diagnosis(es)/condition(s) as documented were addressed during this visit. Due to the added complexity in care, I will continue to support Babita in the subsequent management and with ongoing continuity of care.      Again, thank you for allowing me to participate in the care of your patient.      Sincerely,    Arlette Corbett NP

## 2024-09-13 NOTE — NURSING NOTE
Is the patient currently in the state of [place]? YES    Location: home    Visit mode:VIDEO    If the visit is dropped, the patient can be reconnected by: VIDEO VISIT: Text to cell phone:   Telephone Information:   Mobile [phone]       Will anyone else be joining the visit? NO  (If patient encounters technical issues they should call [phone])    How would you like to obtain your AVS? MyChart    Are changes needed to the allergy or medication list? No    Are refills needed on medications prescribed by this physician? NO    Reason for visit: RECHECK    [facility]    QNR Status: complete

## 2024-09-13 NOTE — ASSESSMENT & PLAN NOTE
Generalized abdominal discomfort intermittently for the last month with vague description, no triggering or alleviating events. Symptoms are gradual onset and never resolve. Not associated with reflux, vomiting, constipation, diarrhea, or eating. Chronically under eating and drinking which is common trigger for abdominal discomfort when taking GLp1 - question if this is trigger. Recommend reducing dose of wegovy or switching to zepbound at a lower dose. Patient would like labs first, she is due for bariatric labs. Positive H.Pylori at time of sleeve which was treated but confirmation never done. Will do confirmation now.     Increase fluids- 60oz daily   Increase protein- 60g daily   Update labs   Consider switching to zepbound 5mg   See dietitian   Follow up 3 months

## 2024-09-13 NOTE — PROGRESS NOTES
Virtual Visit Details    Type of service:  Video Visit   Video Start Time:  1137  Video End Time: 1159    Originating Location (pt. Location): Home    Distant Location (provider location):  Off-site  Platform used for Video Visit: [facility] Bariatric Surgery Note    RE: Babita Rivas  MR#: [id number]  : 1985  VISIT DATE: Sep 13, 2024      Dear Elizabeth Pascual,    I had the pleasure of seeing your patient, Babita Rivas, in my post-bariatric surgery assessment clinic.    Assessment & Plan   Problem List Items Addressed This Visit          Digestive    Class 3 severe obesity due to excess calories with serious comorbidity and body mass index (BMI) of 50.0 to 59.9 in adult (H) - Primary     Generalized abdominal discomfort intermittently for the last month with vague description, no triggering or alleviating events. Symptoms are gradual onset and never resolve. Not associated with reflux, vomiting, constipation, diarrhea, or eating. Chronically under eating and drinking which is common trigger for abdominal discomfort when taking GLp1 - question if this is trigger. Recommend reducing dose of wegovy or switching to zepbound at a lower dose. Patient would like labs first, she is due for bariatric labs. Positive H.Pylori at time of sleeve which was treated but confirmation never done. Will do confirmation now.     Increase fluids- 60oz daily   Increase protein- 60g daily   Update labs   Consider switching to zepbound 5mg   See dietitian   Follow up 3 months          Relevant Medications    tirzepatide-Weight Management (ZEPBOUND) 5 MG/0.5ML prefilled pen    Other Relevant Orders    CBC with platelets    Comprehensive metabolic panel    Hemoglobin A1c    Parathyroid Hormone Intact    Vitamin A    Vitamin B12    Vitamin D Deficiency    Lipid panel reflex to direct LDL Fasting    Lipase    Helicobacter pylori Antigen Stool    Adult Bariatrics and Weight Management Clinic Follow-Up Order    Adult  "Bariatrics and Weight Management Clinic Follow-Up Order       Other    S/P laparoscopic sleeve gastrectomy    Relevant Orders    CBC with platelets    Comprehensive metabolic panel    Hemoglobin A1c    Parathyroid Hormone Intact    Vitamin A    Vitamin B12    Vitamin D Deficiency    Lipid panel reflex to direct LDL Fasting    Lipase    Helicobacter pylori Antigen Stool    Adult Bariatrics and Weight Management Clinic Follow-Up Order    Adult Bariatrics and Weight Management Clinic Follow-Up Order     Other Visit Diagnoses       History of Helicobacter pylori infection        Relevant Orders    Helicobacter pylori Antigen Stool    Adult Bariatrics and Weight Management Clinic Follow-Up Order    Adult Bariatrics and Weight Management Clinic Follow-Up Order               30 minutes spent by me on the date of the encounter doing chart review, history and exam, documentation and further activities per the note    CHIEF COMPLAINT: Post-bariatric surgery follow-up. NBS 2/2021 high weight (290lb BMI 49.7) s/p sleeve 3/2022 with history of DMII and asthma. Restarted victoza post op. 5/2023 - noticing more snacking, discussed health psych and topiramate- had \"fallen off\" bariatric recommendations.   Last seen 4/2024 - discussed increasing protein        HISTORY OF PRESENT ILLNESS:      9/13/2024    11:07 AM   Questions Regarding Prior Weight Loss Surgery Reviewed With Patient   I had the following weight loss procedure Sleeve Gastrectomy   What year was your surgery? 2022   How has your weight changed since your last visit? I have stayed about the same   Do you currently have any of the following None of the above   Do you have any concerns today? stomach pain, labs/stool sample     Abdominal pain starting 1 month ago. Pain is generalized but sometimes up under ribs. Sometimes feels like hungy but not hungry. Sometimes feels sharp. Nothing makes it better. Not associated with eating. Not keeping her from eating. No reflux. No " vomiting. Some constipation just recently - hard to pass- usually has BM daily and can empty bowel. Usually has formed BM shortly after meals.     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 300   What is your lowest weight since surgery? (In pounds) 235     Initial Weight (lbs): 290 lbs  Weight:  (no new weight)  Last Visits Weight: 112 kg (247 lb)              9/13/2024    11:07 AM   Questions Regarding Co-Morbidities and Health Concerns Reviewed With Patient   Pre-diabetes Never   Diabetes II Improved   High Blood Pressure Never   High cholesterol Never   Heartburn/Reflux Never   Sleep apnea Never   PCOS Never   Back pain Gone away   Joint pain Never   Lower leg swelling Never           9/13/2024    11:07 AM   Eating Habits   How many meals do you eat per day? 1   Do you snack between meals? No   How much food are you eating at each meal? Less than 1/2 cup   Are you able to separate your meals and liquids by at least 30 minutes? Yes   Are you able to avoid liquid calories? Yes           9/13/2024    11:07 AM   Exercise Questions Reviewed With Patient   How often do you exercise? 5 to 6 times per week   What is the duration of your exercise (in minutes)? 30 Minutes   What  "types of exercise do you do? walking   What keeps you from being more active? I should be more active but I just have not gotten around to it       Social History:      9/13/2024    11:07 AM   --   Are you smoking? No   Are you drinking alcohol? Yes   How much alcohol? 3/week       Medications:  Current Outpatient Medications   Medication Sig Dispense Refill    tirzepatide-Weight Management (ZEPBOUND) 5 MG/0.5ML prefilled pen Inject 0.5 mLs (5 mg) subcutaneously every 7 days. 2 mL 2    acetaminophen (TYLENOL) 325 MG tablet Take 2 tablets (650 mg) by mouth every 4 hours as needed for other (For optimal non-opioid multimodal pain management to improve pain control and physical function.) (Patient not taking: Reported on 1/12/2024) 30 tablet 0    Calcium Citrate-Vitamin D 500-10 MG-MCG CHEW Take 1 chew tab by mouth 2 times daily 60 tablet 11    cyanocobalamin (CYANOCOBALAMIN) 1000 MCG/ML injection Inject 1 mL (1,000 mcg) Subcutaneous every 30 days 3 mL 3    hydrOXYzine (ATARAX) 10 MG tablet Take 10 mg by mouth       multivitamin w/minerals (MULTIVITAMINS W/MINERALS) tablet Take 1 tablet by mouth 2 times daily Chewable children's multivitamin tablet with iron 180 tablet 3    Semaglutide-Weight Management (WEGOVY) 1.7 MG/0.75ML pen Inject 1.7 mg Subcutaneous every 7 days 3 mL 3    sertraline (ZOLOFT) 25 MG tablet Take 25 mg by mouth daily  (Patient not taking: Reported on 1/12/2024)      Syringe/Needle, Disp, (BD ECLIPSE SYRINGE/NEEDLE) 25G X 5/8\" 3 ML MISC 1 Syringe every 30 days 3 each 3    topiramate (TOPAMAX) 25 MG tablet Take 3 tablets (75 mg) by mouth daily 90 tablet 3    vitamin D3 (CHOLECALCIFEROL) 1.25 MG (24929 UT) capsule Take 1 capsule (50,000 Units) by mouth every 7 days 12 capsule 0     No current facility-administered medications for this visit.         9/13/2024    11:07 AM   --   Do you avoid NSAIDs such as (Ibuprofen, Aleve, Naproxen, Advil)? Yes       ROS:  GI:       9/13/2024    11:07 AM   -- "   Vomiting No   Diarrhea No   Constipation No   Swallowing trouble No   Abdominal pain Yes   Heartburn No     Skin:       9/13/2024    11:07 AM   BAR RBS ROS - SKIN   Rash in skin folds No     Psych:       9/13/2024    11:07 AM   --   Depression Yes   Anxiety No     Female Only:       9/13/2024    11:07 AM   BAR RBS ROS -    Female only None of the above   Stress urinary incontinence No       Lab on 09/12/2023   Component Date Value Ref Range Status    WBC Count 09/12/2023 9.8  4.0 - 11.0 10e3/uL Final    RBC Count 09/12/2023 4.83  3.80 - 5.20 10e6/uL Final    Hemoglobin 09/12/2023 14.3  11.7 - 15.7 g/dL Final    Hematocrit 09/12/2023 42.9  35.0 - 47.0 % Final    MCV 09/12/2023 89  78 - 100 fL Final    MCH 09/12/2023 29.6  26.5 - 33.0 pg Final    MCHC 09/12/2023 33.3  31.5 - 36.5 g/dL Final    RDW 09/12/2023 12.3  10.0 - 15.0 % Final    Platelet Count 09/12/2023 272  150 - 450 10e3/uL Final    Sodium 09/12/2023 137  136 - 145 mmol/L Final    Potassium 09/12/2023 4.0  3.4 - 5.3 mmol/L Final    Chloride 09/12/2023 104  98 - 107 mmol/L Final    Carbon Dioxide (CO2) 09/12/2023 20 (L)  22 - 29 mmol/L Final    Anion Gap 09/12/2023 13  7 - 15 mmol/L Final    Urea Nitrogen 09/12/2023 11.5  6.0 - 20.0 mg/dL Final    Creatinine 09/12/2023 0.83  0.51 - 0.95 mg/dL Final    Calcium 09/12/2023 9.2  8.6 - 10.0 mg/dL Final    Glucose 09/12/2023 89  70 - 99 mg/dL Final    Alkaline Phosphatase 09/12/2023 61  35 - 104 U/L Final    AST 09/12/2023 16  0 - 45 U/L Final    Reference intervals for this test were updated on 6/12/2023 to more accurately reflect our healthy population. There may be differences in the flagging of prior results with similar values performed with this method. Interpretation of those prior results can be made in the context of the updated reference intervals.    ALT 09/12/2023 8  0 - 50 U/L Final    Reference intervals for this test were updated on 6/12/2023 to more accurately reflect our healthy population.  "There may be differences in the flagging of prior results with similar values performed with this method. Interpretation of those prior results can be made in the context of the updated reference intervals.      Protein Total 09/12/2023 7.4  6.4 - 8.3 g/dL Final    Albumin 09/12/2023 4.6  3.5 - 5.2 g/dL Final    Bilirubin Total 09/12/2023 0.9  <=1.2 mg/dL Final    GFR Estimate 09/12/2023 >90  >60 mL/min/1.73m2 Final    Hemoglobin A1C 09/12/2023 5.7 (H)  0.0 - 5.6 % Final    Normal <5.7%   Prediabetes 5.7-6.4%    Diabetes 6.5% or higher     Note: Adopted from ADA consensus guidelines.    Parathyroid Hormone Intact 09/12/2023 31  15 - 65 pg/mL Final    Vitamin A 09/12/2023 0.78  0.30 - 1.20 mg/L Final    Retinol Palmitate 09/12/2023 <0.02  0.00 - 0.10 mg/L Final    Vitamin A Interp 09/12/2023 Normal   Final      This test was developed and its performance characteristics   determined by [facility]. It has not been cleared or   approved by the US Food and Drug Administration. This test   was performed in a CLIA certified laboratory and is   intended for clinical purposes.  Performed By: [facility]  [address] [place] [id number]  : Jeffrey Fagan MD, PhD  CLIA Number: [id number]    Vitamin B12 09/12/2023 959  232 - 1,245 pg/mL Final    Vitamin D, Total (25-Hydroxy) 09/12/2023 17 (L)  20 - 75 ug/L Final              No data to display                  PHYSICAL EXAM:  Objective    Ht 1.6 m (5' 2.99\")   BMI 43.77 kg/m           Vitals:  No vitals were obtained today due to virtual visit.    Physical Exam   GENERAL: alert and no distress  EYES: Eyes grossly normal to inspection.  No discharge or erythema, or obvious scleral/conjunctival abnormalities.  RESP: No audible wheeze, cough, or visible cyanosis.    SKIN: Visible skin clear. No significant rash, abnormal pigmentation or lesions.  NEURO: Cranial nerves grossly intact.  Mentation and speech appropriate for " age.  PSYCH: Appropriate affect, tone, and pace of words        Sincerely,    Arlette Corbett NP    The longitudinal plan of care for the diagnosis(es)/condition(s) as documented were addressed during this visit. Due to the added complexity in care, I will continue to support Babita in the subsequent management and with ongoing continuity of care.

## 2024-09-16 RX ORDER — SEMAGLUTIDE 1.7 MG/.75ML
1.7 INJECTION, SOLUTION SUBCUTANEOUS
Qty: 3 ML | Status: CANCELLED | OUTPATIENT
Start: 2024-09-16

## 2024-09-16 NOTE — TELEPHONE ENCOUNTER
Prior Authorization Approval    Medication: ZEPBOUND 5 MG/0.5ML SC SOAJ  Authorization Effective Date: 9/14/2024  Authorization Expiration Date: 3/14/2025  Approved Dose/Quantity: 4 pens per 28 days  Reference #: Key: [id number]   Insurance Company: [employer] - Phone [phone] Fax [phone]  Expected CoPay: $ 0  CoPay Card Available: No    Financial Assistance Needed: N/A  Which Pharmacy is filling the prescription: [id number] - [facility], [place] - [address] AT [address] [place]  Pharmacy Notified: Released Rx  Patient Notified: By pharmacy

## 2024-09-16 NOTE — TELEPHONE ENCOUNTER
Semaglutide-Weight Management (WEGOVY) 1.7 MG/0.75ML pen   3 mL 3 4/18/2024       Last Office Visit : 9-  Future Office visit:  none        topiramate (TOPAMAX) 25 MG tablet   90 tablet 3 4/18/2024     Last Office Visit : 9-  Future Office visit:  none    Labs completed on :   2-  Care Everywhere labs  Creatinine  0.55 - 1.02 mg/dL 1.04 High      Anion Gap  0.0 - 15.0 mmol/L 12.0     Chloride  98 - 107 mmol/L 107     Potassium  3.5 - 5.1 mmol/L 3.6

## 2024-09-17 RX ORDER — TOPIRAMATE 25 MG/1
75 TABLET, FILM COATED ORAL DAILY
Qty: 90 TABLET | Refills: 3 | Status: SHIPPED | OUTPATIENT
Start: 2024-09-17

## 2024-09-22 ENCOUNTER — HEALTH MAINTENANCE LETTER (OUTPATIENT)
Age: 39
End: 2024-09-22

## 2024-09-24 ENCOUNTER — TELEPHONE (OUTPATIENT)
Dept: ENDOCRINOLOGY | Facility: CLINIC | Age: 39
End: 2024-09-24
Payer: COMMERCIAL

## 2024-09-24 NOTE — TELEPHONE ENCOUNTER
Patient confirmed scheduled appointment:  Date: 10/14/24  Time: 230pm  Visit type: return nutrition  Provider: joshua sanchez

## 2024-10-08 DIAGNOSIS — E66.01 CLASS 3 SEVERE OBESITY DUE TO EXCESS CALORIES WITH SERIOUS COMORBIDITY AND BODY MASS INDEX (BMI) OF 50.0 TO 59.9 IN ADULT (H): ICD-10-CM

## 2024-10-08 DIAGNOSIS — E11.9 TYPE 2 DIABETES MELLITUS WITHOUT COMPLICATION, WITHOUT LONG-TERM CURRENT USE OF INSULIN (H): ICD-10-CM

## 2024-10-08 DIAGNOSIS — E66.813 CLASS 3 SEVERE OBESITY DUE TO EXCESS CALORIES WITH SERIOUS COMORBIDITY AND BODY MASS INDEX (BMI) OF 50.0 TO 59.9 IN ADULT (H): ICD-10-CM

## 2024-10-08 DIAGNOSIS — Z98.84 S/P LAPAROSCOPIC SLEEVE GASTRECTOMY: ICD-10-CM

## 2024-10-10 RX ORDER — SEMAGLUTIDE 1.7 MG/.75ML
1.7 INJECTION, SOLUTION SUBCUTANEOUS
Qty: 3 ML | Refills: 3 | OUTPATIENT
Start: 2024-10-10

## 2025-01-12 ENCOUNTER — HEALTH MAINTENANCE LETTER (OUTPATIENT)
Age: 40
End: 2025-01-12

## 2025-02-19 ENCOUNTER — TELEPHONE (OUTPATIENT)
Dept: ENDOCRINOLOGY | Facility: CLINIC | Age: 40
End: 2025-02-19

## 2025-04-05 ENCOUNTER — HEALTH MAINTENANCE LETTER (OUTPATIENT)
Age: 40
End: 2025-04-05

## 2025-04-26 ENCOUNTER — HEALTH MAINTENANCE LETTER (OUTPATIENT)
Age: 40
End: 2025-04-26

## 2025-05-17 ENCOUNTER — HEALTH MAINTENANCE LETTER (OUTPATIENT)
Age: 40
End: 2025-05-17

## 2025-06-17 DIAGNOSIS — E66.813 CLASS 3 SEVERE OBESITY DUE TO EXCESS CALORIES WITH SERIOUS COMORBIDITY AND BODY MASS INDEX (BMI) OF 50.0 TO 59.9 IN ADULT (H): ICD-10-CM

## 2025-06-17 DIAGNOSIS — Z98.84 S/P LAPAROSCOPIC SLEEVE GASTRECTOMY: ICD-10-CM

## 2025-06-17 DIAGNOSIS — E11.9 TYPE 2 DIABETES MELLITUS WITHOUT COMPLICATION, WITHOUT LONG-TERM CURRENT USE OF INSULIN (H): ICD-10-CM

## 2025-06-17 RX ORDER — SEMAGLUTIDE 1.7 MG/.75ML
1.7 INJECTION, SOLUTION SUBCUTANEOUS
Qty: 3 ML | Refills: 3 | Status: CANCELLED | OUTPATIENT
Start: 2025-06-17

## 2025-06-17 NOTE — TELEPHONE ENCOUNTER
Refill denied. Medication restart will need to be discussed at the time of a visit. Patient is over due for follow up.

## 2025-06-17 NOTE — TELEPHONE ENCOUNTER
Medication Question or Refill    Contacts       Contact Date/Time Type Contact Phone/Fax    06/17/2025 11:25 AM CDT Phone (Incoming) RobBabita (Self) [phone] (M)            What medication are you calling about (include dose and sig)?: Ozempic     Preferred Pharmacy:   [id number] - [facility], [place] - [address] AT [address] & [address]  [address] [address]  Phone: [phone] Fax: [phone]      Controlled Substance Agreement on file:   CSA -- Patient Level:    CSA: None found at the patient level.       Who prescribed the medication?: Arlette Corbett     Do you need a refill? Yes    When did you use the medication last? 2 months ago.    Patient offered an appointment? Yes: pt. Is scheduled with Bina Culp on October 7th, 2025 and is on the wait list.    Do you have any questions or concerns?  No      Could we send this information to you in VYou or would you prefer to receive a phone call?:   Patient would like to be contacted via VYou

## 2025-06-17 NOTE — TELEPHONE ENCOUNTER
Last Written Prescription:  Semaglutide-Weight Management (WEGOVY) 1.7 MG/0.75ML pen 3 mL 3 4/18/2024     ----------------------  Last Visit Date: 9/13/24  Future Visit Date: 10/7/25  ----------------------        Refill decision: Medication unable to be refilled by RN due to: Other:    Looks like has been off meds for awhile  last my chart states  ... We'll have Bina discuss medication options when you see her in a couple weeks. But no show on 6/5/25    Request from pharmacy:  Requested Prescriptions

## 2025-07-08 DIAGNOSIS — E66.813 CLASS 3 SEVERE OBESITY DUE TO EXCESS CALORIES WITH SERIOUS COMORBIDITY AND BODY MASS INDEX (BMI) OF 50.0 TO 59.9 IN ADULT (H): ICD-10-CM

## 2025-07-08 DIAGNOSIS — E11.9 TYPE 2 DIABETES MELLITUS WITHOUT COMPLICATION, WITHOUT LONG-TERM CURRENT USE OF INSULIN (H): ICD-10-CM

## 2025-07-08 DIAGNOSIS — Z98.84 S/P LAPAROSCOPIC SLEEVE GASTRECTOMY: ICD-10-CM

## 2025-07-08 RX ORDER — SEMAGLUTIDE 1.7 MG/.75ML
1.7 INJECTION, SOLUTION SUBCUTANEOUS
Qty: 3 ML | Refills: 3 | Status: CANCELLED | OUTPATIENT
Start: 2025-07-08

## 2025-07-08 NOTE — TELEPHONE ENCOUNTER
Refill denied. Gap in medication therapy. Patient no showed last visit. Will need to discuss medication restart at next appointment.

## 2025-07-08 NOTE — TELEPHONE ENCOUNTER
Last Written Prescription:     Semaglutide-Weight Management (WEGOVY) 1.7 MG/0.75ML pen 3 mL 3 4/18/2024 -- --   Sig - Route: Inject 1.7 mg Subcutaneous every 7 days - Subcutaneous     ----------------------  Last Visit Date: 9/13/24  Future Visit Date: 10/7/25  ----------------------      Refill decision:      [x] Medication unable to be refilled by RN due to: Other: Confirm dosage/ gap in refills  Last Semaglutide 1.7 Mg was ordered 4/18/24, and most recently ordered was Zepbound   tirzepatide-Weight Management (ZEPBOUND) 5 MG/0.5ML prefilled pen 2 mL 2 9/13/2024         Request from pharmacy:  Requested Prescriptions   Pending Prescriptions Disp Refills    Semaglutide-Weight Management (WEGOVY) 1.7 MG/0.75ML pen 3 mL 3     Sig: Inject 1.7 mg subcutaneously every 7 days.       GLP-1 Agonists Protocol Failed - 7/8/2025  3:15 PM        Failed - HgbA1C in past 3 or 6 months     If HgbA1C is 8 or greater, it needs to be on file within the past 3 months.  If less than 8, must be on file within the past 6 months.     Recent Labs   Lab Test 09/12/23  1654   A1C 5.7*             Failed - Has GFR on file in past 12 months and most recent value is normal        Failed - Recent (6 month) or future (90 days) visit with the authorizing provider's specialty (provided they have been seen in the past 9 months)     The patient must have completed an in-person or virtual visit within the past 6 months or has a future visit scheduled within the next 90 days with the authorizing provider s specialty.  Urgent care and e-visits do not quality as an office visit for this protocol.          Passed - Medication is active on med list and the sig matches. RN to manually verify dose and sig if red X/fail.     If the protocol passes (green check), you do not need to verify med dose and sig.    A prescription matches if they are the same clinical intention.    For Example: once daily and every morning are the same.    The protocol can not  identify upper and lower case letters as matching and will fail.     For Example: Take 1 tablet (50 mg) by mouth daily     TAKE 1 TABLET (50 MG) BY MOUTH DAILY    For all fails (red x), verify dose and sig.    If the refill does match what is on file, the RN can still proceed to approve the refill request.       If they do not match, route to the appropriate provider.             Passed - Medication indicated for associated diagnosis     Medication is associated with one or more of the following diagnoses:    Type 2 diabetes mellitus  Obesity          Passed - Patient is age 18 or older        Passed - No active pregnancy on record        Passed - No positive pregnancy test in past 12 months

## 2025-07-08 NOTE — TELEPHONE ENCOUNTER
Medication Question or Refill    Contacts       Contact Date/Time Type Contact Phone/Fax    07/08/2025 02:05 PM CDT Phone (Incoming) Babita Rivas (Self) [phone] (M)            What medication are you calling about (include dose and sig)?: Ozempic    Preferred Pharmacy:   [id number] - [facility], [place] - [address] AT [address] & [address]  [address] [address]  Phone: [phone] Fax: [phone]      Controlled Substance Agreement on file:   CSA -- Patient Level:    CSA: None found at the patient level.       Who prescribed the medication?: Bina Culp    Do you need a refill? Yes    When did you use the medication last? 2 months ago.    Patient offered an appointment? No, Pt. Has an upcoming appointment on October 7th, 2025.    Do you have any questions or concerns?  Yes: Pt. would like to have her prescription refilled.      Could we send this information to you in [place] or would you prefer to receive a phone call?:   Patient would prefer a phone call   Okay to leave a detailed message?: Yes at Cell number on file:    Telephone Information:   Mobile [phone]

## 2025-08-09 ENCOUNTER — HEALTH MAINTENANCE LETTER (OUTPATIENT)
Age: 40
End: 2025-08-09

## 2025-08-27 ENCOUNTER — VIRTUAL VISIT (OUTPATIENT)
Dept: ENDOCRINOLOGY | Facility: CLINIC | Age: 40
End: 2025-08-27

## 2025-08-27 ENCOUNTER — TELEPHONE (OUTPATIENT)
Dept: ENDOCRINOLOGY | Facility: CLINIC | Age: 40
End: 2025-08-27

## 2025-08-27 VITALS — BODY MASS INDEX: 40.75 KG/M2 | WEIGHT: 230 LBS | HEIGHT: 63 IN

## 2025-08-27 DIAGNOSIS — Z86.19 HISTORY OF HELICOBACTER PYLORI INFECTION: ICD-10-CM

## 2025-08-27 DIAGNOSIS — E66.813 CLASS 3 SEVERE OBESITY DUE TO EXCESS CALORIES WITH SERIOUS COMORBIDITY AND BODY MASS INDEX (BMI) OF 50.0 TO 59.9 IN ADULT (H): Primary | ICD-10-CM

## 2025-08-27 ASSESSMENT — PAIN SCALES - GENERAL: PAINLEVEL_OUTOF10: NO PAIN (0)

## 2025-09-03 ENCOUNTER — TELEPHONE (OUTPATIENT)
Dept: ENDOCRINOLOGY | Facility: CLINIC | Age: 40
End: 2025-09-03

## (undated) DEVICE — ENDO POUCH UNIVERSAL RETRIEVAL SYSTEM INZII 12/15MM CD004

## (undated) DEVICE — SUCTION MANIFOLD NEPTUNE 2 SYS 4 PORT 0702-020-000

## (undated) DEVICE — ENDO TROCAR FIRST ENTRY KII FIOS ADV FIX 05X100MM CFF03

## (undated) DEVICE — DRSG PRIMAPORE 02X3" 7133

## (undated) DEVICE — EPIX UNIVERSAL CLIP APPLIER

## (undated) DEVICE — LINEN TOWEL PACK X30 5481

## (undated) DEVICE — STPL POWERED ECHELON LONG 60MM PLEE60A

## (undated) DEVICE — COVER CAMERA IN-LIGHT DISP LT-C02

## (undated) DEVICE — ENDO TROCAR SLEEVE KII ADV FIXATION 05X100MM CFS02

## (undated) DEVICE — NDL INSUFFLATION 13GA 120MM C2201

## (undated) DEVICE — SYR 10ML LL W/O NDL 302995

## (undated) DEVICE — PREP CHLORAPREP 26ML TINTED HI-LITE ORANGE 930815

## (undated) DEVICE — SYR 30ML LL W/O NDL 302832

## (undated) DEVICE — ENDO TROCAR OPTICAL ACCESS KII Z-THRD 15X100MM C0R37

## (undated) DEVICE — STPL RELOAD REG TISSUE ECHELON 60 X 3.6MM BLUE GST60B

## (undated) DEVICE — TUBING SMOKE EVAC PNEUMOCLEAR HIGH FLOW 0620050250

## (undated) DEVICE — NDL INSUFFLATION 13GA 150MM C2202

## (undated) DEVICE — ESU GROUND PAD ADULT W/CORD E7507

## (undated) DEVICE — LINEN TOWEL PACK X6 WHITE 5487

## (undated) DEVICE — Device

## (undated) DEVICE — ESU LIGASURE MARYLAND VESSEL LAP 44CM XLONG LF1944

## (undated) RX ORDER — LIDOCAINE HYDROCHLORIDE 20 MG/ML
INJECTION, SOLUTION EPIDURAL; INFILTRATION; INTRACAUDAL; PERINEURAL
Status: DISPENSED
Start: 2022-03-22

## (undated) RX ORDER — FENTANYL CITRATE 50 UG/ML
INJECTION, SOLUTION INTRAMUSCULAR; INTRAVENOUS
Status: DISPENSED
Start: 2022-03-22

## (undated) RX ORDER — HYDROMORPHONE HCL IN WATER/PF 6 MG/30 ML
PATIENT CONTROLLED ANALGESIA SYRINGE INTRAVENOUS
Status: DISPENSED
Start: 2022-03-22

## (undated) RX ORDER — BUPIVACAINE HYDROCHLORIDE 5 MG/ML
INJECTION, SOLUTION EPIDURAL; INTRACAUDAL
Status: DISPENSED
Start: 2022-03-22

## (undated) RX ORDER — ROCURONIUM BROMIDE 50 MG/5 ML
SYRINGE (ML) INTRAVENOUS
Status: DISPENSED
Start: 2022-03-22

## (undated) RX ORDER — SCOLOPAMINE TRANSDERMAL SYSTEM 1 MG/1
PATCH, EXTENDED RELEASE TRANSDERMAL
Status: DISPENSED
Start: 2022-03-22

## (undated) RX ORDER — ONDANSETRON 2 MG/ML
INJECTION INTRAMUSCULAR; INTRAVENOUS
Status: DISPENSED
Start: 2022-03-22

## (undated) RX ORDER — BUPIVACAINE HYDROCHLORIDE 2.5 MG/ML
INJECTION, SOLUTION EPIDURAL; INFILTRATION; INTRACAUDAL
Status: DISPENSED
Start: 2022-03-22

## (undated) RX ORDER — OXYCODONE HYDROCHLORIDE 5 MG/1
TABLET ORAL
Status: DISPENSED
Start: 2022-03-22

## (undated) RX ORDER — BUPIVACAINE HYDROCHLORIDE AND EPINEPHRINE 2.5; 5 MG/ML; UG/ML
INJECTION, SOLUTION INFILTRATION; PERINEURAL
Status: DISPENSED
Start: 2022-03-22

## (undated) RX ORDER — ACETAMINOPHEN 325 MG/1
TABLET ORAL
Status: DISPENSED
Start: 2022-03-22

## (undated) RX ORDER — PROPOFOL 10 MG/ML
INJECTION, EMULSION INTRAVENOUS
Status: DISPENSED
Start: 2022-03-22

## (undated) RX ORDER — KETOROLAC TROMETHAMINE 30 MG/ML
INJECTION, SOLUTION INTRAMUSCULAR; INTRAVENOUS
Status: DISPENSED
Start: 2022-03-22